# Patient Record
Sex: MALE | Race: WHITE | Employment: OTHER | URBAN - METROPOLITAN AREA
[De-identification: names, ages, dates, MRNs, and addresses within clinical notes are randomized per-mention and may not be internally consistent; named-entity substitution may affect disease eponyms.]

---

## 2017-01-16 ENCOUNTER — OFFICE VISIT (OUTPATIENT)
Dept: FAMILY MEDICINE CLINIC | Age: 72
End: 2017-01-16

## 2017-01-16 VITALS
HEART RATE: 81 BPM | HEIGHT: 69 IN | SYSTOLIC BLOOD PRESSURE: 147 MMHG | OXYGEN SATURATION: 97 % | TEMPERATURE: 97 F | RESPIRATION RATE: 18 BRPM | DIASTOLIC BLOOD PRESSURE: 71 MMHG | BODY MASS INDEX: 30.17 KG/M2 | WEIGHT: 203.7 LBS

## 2017-01-16 DIAGNOSIS — I10 ESSENTIAL HYPERTENSION: ICD-10-CM

## 2017-01-16 DIAGNOSIS — N18.9 ANEMIA IN CHRONIC RENAL DISEASE: ICD-10-CM

## 2017-01-16 DIAGNOSIS — E78.5 HYPERLIPIDEMIA, UNSPECIFIED HYPERLIPIDEMIA TYPE: ICD-10-CM

## 2017-01-16 DIAGNOSIS — D63.1 ANEMIA IN CHRONIC RENAL DISEASE: ICD-10-CM

## 2017-01-16 DIAGNOSIS — Z12.5 PROSTATE CANCER SCREENING: ICD-10-CM

## 2017-01-16 DIAGNOSIS — N18.30 CHRONIC KIDNEY DISEASE, STAGE III (MODERATE) (HCC): ICD-10-CM

## 2017-01-16 DIAGNOSIS — E78.00 PURE HYPERCHOLESTEROLEMIA: ICD-10-CM

## 2017-01-16 DIAGNOSIS — Z71.89 ADVANCED DIRECTIVES, COUNSELING/DISCUSSION: ICD-10-CM

## 2017-01-16 DIAGNOSIS — Z13.39 SCREENING FOR ALCOHOLISM: ICD-10-CM

## 2017-01-16 DIAGNOSIS — Z00.00 PHYSICAL EXAM: ICD-10-CM

## 2017-01-16 DIAGNOSIS — E79.0 HYPERURICEMIA: ICD-10-CM

## 2017-01-16 DIAGNOSIS — E55.9 VITAMIN D DEFICIENCY: ICD-10-CM

## 2017-01-16 DIAGNOSIS — R80.9 PROTEINURIA: ICD-10-CM

## 2017-01-16 DIAGNOSIS — Z00.00 MEDICARE ANNUAL WELLNESS VISIT, SUBSEQUENT: Primary | ICD-10-CM

## 2017-01-16 NOTE — MR AVS SNAPSHOT
Visit Information Date & Time Provider Department Dept. Phone Encounter #  
 1/16/2017 11:00 AM Piter Ellis MD [facility] [phone] [id number] Follow-up Instructions Return in about 6 months (around 7/16/2017) for Recheck. Upcoming Health Maintenance Date Due DTaP/Tdap/Td series (1 - Tdap) 8/15/2007 MEDICARE YEARLY EXAM 9/9/2015 GLAUCOMA SCREENING Q2Y 12/29/2018 COLONOSCOPY 6/16/2024 Allergies as of 1/16/2017  Review Complete On: 1/16/2017 By: Piter Ellis MD  
 No Known Allergies Current Immunizations  Reviewed on 1/13/2017 Name Date Influenza High Dose Vaccine PF 9/21/2016 11:35 AM, 10/21/2015, 10/29/2014 Influenza Vaccine Split 9/26/2012, 11/16/2010 Pneumococcal Conjugate (PCV-13) 10/21/2015 Pneumococcal Polysaccharide (PPSV-23) 9/7/2012, 8/14/2007 TD Vaccine 8/14/2007 Zoster 9/15/2006 Not reviewed this visit You Were Diagnosed With   
  
 Codes Comments Elevated BP    -  Primary ICD-10-CM: I10 
ICD-9-CM: 401.9 Medicare annual wellness visit, subsequent     ICD-10-CM: Z00.00 ICD-9-CM: V70.0 Physical exam     ICD-10-CM: Z00.00 ICD-9-CM: V70.9 Hyperlipidemia, unspecified hyperlipidemia type     ICD-10-CM: E78.5 ICD-9-CM: 272.4 Anemia in chronic renal disease     ICD-10-CM: N18.9, D63.1 ICD-9-CM: 285.21, 585.9 Vitamin D deficiency     ICD-10-CM: E55.9 ICD-9-CM: 268.9 Chronic kidney disease, stage III (moderate)     ICD-10-CM: N18.3 ICD-9-CM: 500. 3 Proteinuria     ICD-10-CM: R80.9 ICD-9-CM: 791.0 Hyperuricemia     ICD-10-CM: E79.0 ICD-9-CM: 790.6 Pure hypercholesterolemia     ICD-10-CM: E78.00 ICD-9-CM: 272.0 Essential hypertension     ICD-10-CM: I10 
ICD-9-CM: 401.9 Normal body mass index (BMI)     ICD-10-CM: Z78.9 ICD-9-CM: V49.89 Routine general medical examination at a health care facility     ICD-10-CM: Z00.00 ICD-9-CM: V70.0 Screening for alcoholism     ICD-10-CM: Z13.89 ICD-9-CM: V79.1 Advanced directives, counseling/discussion     ICD-10-CM: Z71.89 ICD-9-CM: V65.49 Vitals BP Pulse Temp Resp Height(growth percentile) Weight(growth percentile) 147/71 (BP 1 Location: Right arm, BP Patient Position: Sitting) 81 97 °F (36.1 °C) 18 5' 9.29\" (1.76 m) 203 lb 11.2 oz (92.4 kg) SpO2 BMI Smoking Status 97% 29.83 kg/m2 Never Smoker BMI and BSA Data Body Mass Index Body Surface Area  
 29.83 kg/m 2 2.13 m 2 Preferred Pharmacy Pharmacy Name Phone [facility] [address], [address] [place] [address]. [phone] Your Updated Medication List  
  
   
This list is accurate as of: 1/16/17 11:33 AM.  Always use your most recent med list.  
  
  
  
  
 allopurinol 100 mg tablet Commonly known as:  Kristin Huh Take 100 mg by mouth nightly. amLODIPine-benazepril 5-10 mg per capsule Commonly known as:  Dior Deep Take 1 Cap by mouth daily. aspirin delayed-release 81 mg tablet Take 81 mg by mouth daily. CLARITIN 10 mg tablet Generic drug:  loratadine Take 10 mg by mouth daily. folic acid 109 mcg tablet Take 400 mcg by mouth daily. KRILL OIL PO Take 1 Tab by mouth two (2) times a day. OCUVITE tablet Generic drug:  vitamin a-vitamin c-vit e-min Take 1 Tab by mouth daily. PROCRIT INJECTION  
by Injection route. simvastatin 10 mg tablet Commonly known as:  ZOCOR  
TAKE 1 TABLET NIGHTLY  
  
 vitamin E 400 unit capsule Commonly known as:  [address] Take 400 Units by mouth two (2) times a day. Follow-up Instructions Return in about 6 months (around 7/16/2017) for Recheck. Introducing \[facility] [place] [facility]\"" & HEALTH SERVICES! Dear Pj Hancock: Thank you for requesting a [employer] account. Our records indicate that you already have an active [employer] account.   You can access your account anytime at [email or web address] Did you know that you can access your hospital and ER discharge instructions at any time in [employer]? You can also review all of your test results from your hospital stay or ER visit. Additional Information If you have questions, please visit the Frequently Asked Questions section of the [employer] website at [email or web address] Remember, [employer] is NOT to be used for urgent needs. For medical emergencies, dial 911. Now available from your iPhone and Android! Please provide this summary of care documentation to your next provider. Your primary care clinician is listed as Augustin Gross Dr. If you have any questions after today's visit, please call [phone].

## 2017-01-16 NOTE — PROGRESS NOTES
HISTORY OF PRESENT ILLNESS  Katy Leroy is a 80 y.o. male. HPI   Here for [facility]. Eye doctor past yr. Dentist 2 x annually. Gum doctor q 4 mos. Colonoscopy '14.  10 yr repeat. Heme q 2 weeks. Neph 2 x annually. Hosp Sept '16 for anemia. Got transfused. No other signif hx since last [facility] 11/2015. Discussed and pt desires LUCRETIA, PSA. Patient Active Problem List   Diagnosis Code    Pure hypercholesterolemia E78.00    Essential hypertension I10    Hyperuricemia E79.0    Chronic kidney disease, stage III (moderate) N18.3    Proteinuria R80.9    Vitamin D deficiency E55.9    Anemia in chronic renal disease N18.9, D63.1    Elevated random blood glucose level R73.09    Anemia D64.9    Hyperlipidemia E78.5     Current Outpatient Prescriptions   Medication Sig Dispense Refill    EPOETIN KARIME (PROCRIT INJECTION) by Injection route.  simvastatin (ZOCOR) 10 mg tablet TAKE 1 TABLET NIGHTLY 90 Tab 0    KRILL OIL PO Take 1 Tab by mouth two (2) times a day.  amLODIPine-benazepril (LOTREL) 5-10 mg per capsule Take 1 Cap by mouth daily.  allopurinol (ZYLOPRIM) 100 mg tablet Take 100 mg by mouth nightly.  vitamin a-vitamin c-vit e-min (OCUVITE) tablet Take 1 Tab by mouth daily.  aspirin delayed-release 81 mg tablet Take 81 mg by mouth daily.  loratadine (CLARITIN) 10 mg tablet Take 10 mg by mouth daily.  folic acid 090 mcg tablet Take 400 mcg by mouth daily.  vitamin E (AQUA GEMS) 400 unit capsule Take 400 Units by mouth two (2) times a day. No Known Allergies  Past Medical History   Diagnosis Date    Advance directive discussed with patient     Allergic rhinitis     Anemia NEC       federico aldrich 10/30/09- 5/2014-6/25/15  12/7/16 note from [facility]    Cancer [facility])      skin    Chronic kidney disease      dr Siobhan Garrison stage 3  11/7/16     Contact dermatitis and other eczema, due to unspecified cause      rosacea, and [place]    Gingivitis  History of chicken pox     Hypercholesterolemia     Hypertension     Renal cyst      woody  5/4/16    Screening for glaucoma 12/29/2016     nury alexandrea note    Trauma       5 fx ribs and splenic injury 6/10     Past Surgical History   Procedure Laterality Date    Ct heart w/o cont with calcium  12/08     score of 46    Egd  11/18/09     dr Ghada Hdz grade 1 esophagitis    Endoscopy, colon, diagnostic  6/16/2014 6/16/14 with dr. Heredia/11/09 dr Ghada Hdz diverticula and 10 year repeat     Family History   Problem Relation Age of Onset    Cancer Mother      lung smoker    Hypertension Mother     Cancer Father     Heart Disease Father     Diabetes Maternal Grandmother      Social History   Substance Use Topics    Smoking status: Never Smoker    Smokeless tobacco: Never Used    Alcohol use No      Lab Results  Component Value Date/Time   WBC 4.4 09/07/2016 04:30 AM   HGB 8.4 09/07/2016 01:42 PM   HCT 24.8 09/07/2016 01:42 PM   PLATELET 116 99/14/1289 04:30 AM   MCV 92.1 09/07/2016 04:30 AM       Lab Results  Component Value Date/Time   Hemoglobin A1c 5.6 09/09/2013 08:55 AM   Hemoglobin A1c 5.7 05/07/2012 09:48 AM   Glucose 111 09/07/2016 04:30 AM   LDL, calculated 46 11/12/2015 08:29 AM   Creatinine 1.93 09/07/2016 04:30 AM      Lab Results  Component Value Date/Time   Cholesterol, total 117 11/12/2015 08:29 AM   HDL Cholesterol 25 11/12/2015 08:29 AM   LDL, calculated 46 11/12/2015 08:29 AM   Triglyceride 228 11/12/2015 08:29 AM   CHOL/HDL Ratio 3.9 04/15/2010 09:00 AM       Lab Results  Component Value Date/Time   ALT 22 09/06/2016 04:55 AM   AST 17 09/06/2016 04:55 AM   Alk.  phosphatase 63 09/06/2016 04:55 AM   Bilirubin, direct 0.17 02/11/2011 09:49 AM   Bilirubin, total 0.4 09/06/2016 04:55 AM       Lab Results  Component Value Date/Time   GFR est AA 42 09/07/2016 04:30 AM   GFR est non-AA 35 09/07/2016 04:30 AM   Creatinine 1.93 09/07/2016 04:30 AM   BUN 43 09/07/2016 04:30 AM   Sodium 142 09/07/2016 04:30 AM   Potassium 4.1 09/07/2016 04:30 AM   Chloride 113 09/07/2016 04:30 AM   CO2 17 09/07/2016 04:30 AM      Lab Results  Component Value Date/Time   TSH 2.050 11/12/2015 08:29 AM      Lab Results   Component Value Date/Time    Glucose 111 09/07/2016 04:30 AM       Lab Results   Component Value Date/Time    Prostate Specific Ag 0.7 11/12/2015 08:29 AM    Prostate Specific Ag 0.8 09/08/2014 09:45 AM    Prostate Specific Ag 0.9 09/09/2013 08:55 AM    Prostate Specific Ag 0.7 10/07/2009 08:23 AM     Not fasting  Review of Systems   Constitutional: Negative. HENT: Negative. Eyes: Negative. Respiratory: Negative. Cardiovascular: Negative. Gastrointestinal: Negative. Genitourinary: Negative. Musculoskeletal: Negative. Skin: Negative. Neurological: Negative. Endo/Heme/Allergies: Positive for environmental allergies. Bruises/bleeds easily. Psychiatric/Behavioral: Negative. Physical Exam   Vitals:    01/16/17 1059   BP: 147/71   Pulse: 81   Resp: 18   Temp: 97 °F (36.1 °C)   SpO2: 97%   Weight: 203 lb 11.2 oz (92.4 kg)   Height: 5' 9.29\" (1.76 m)       General  alert, cooperative, no distress, appears stated age   Head  Normocephalic, without obvious abnormality, atraumatic   Eyes  conjunctivae/corneas clear. PERRL. Fundi benign   Ears  Canals and TMs clear   Nose Passages patent. Mucosa normal. No drainage or sinus tenderness. Throat Lips, mucosa, and tongue normal. Teeth and gums normal.  Post pharynx neg. Neck supple, nontender, no adenopathy, thyroid: not enlarged, no masses/nodules, no carotid bruits   Back   symmetric, no curvature. FROM. No CVA tenderness   Lungs   clear to auscultation bilaterally   Chest wall  no tenderness   Heart  regular rate and rhythm, no murmur, click, rub or gallop   Abdomen   soft, non-tender. Bowel sounds normal. No masses,  No organomegaly   Genitalia  Testes descended bilat w/o masses.   No hernias   Rectal  Normal tone, normal prostate, no masses or tenderness   Extremities extremities normal, atraumatic, no cyanosis or edema   Pulses 2+ and symmetric   Skin No rashes or lesions       Neurologic Romberg neg, nml heel, toe and Tandem gait. DTRs 2+ symmetric         ASSESSMENT and PLAN    ICD-10-CM ICD-9-CM    1. Medicare annual wellness visit, subsequent Z00.00 V70.0    2. Physical exam Z00.00 V70.9 LIPID PANEL      PSA SCREENING (SCREENING)      URINALYSIS W/ RFLX MICROSCOPIC      TSH 3RD GENERATION   3. Elevated BP I10 401.9    4. Hyperlipidemia, unspecified hyperlipidemia type E78.5 272.4 LIPID PANEL   5. Anemia in chronic renal disease N18.9 285.21 URINALYSIS W/ RFLX MICROSCOPIC    D63.1 585.9    6. Vitamin D deficiency E55.9 268.9 URINALYSIS W/ RFLX MICROSCOPIC   7. Chronic kidney disease, stage III (moderate) N18.3 585. 3 URINALYSIS W/ RFLX MICROSCOPIC   8. Proteinuria R80.9 791.0 URINALYSIS W/ RFLX MICROSCOPIC   9. Hyperuricemia E79.0 790.6 URIC ACID   10. Pure hypercholesterolemia E78.00 272.0 LIPID PANEL   11. Essential hypertension I10 401.9 LIPID PANEL      URINALYSIS W/ RFLX MICROSCOPIC      TSH 3RD GENERATION   12. Normal body mass index (BMI) Z78.9 V49.89    13. Screening for alcoholism Z13.89 V79.1    14. Advanced directives, counseling/discussion Z71.89 V65.49    15. Prostate cancer screening Z12.5 V76.44 PSA SCREENING (SCREENING)     Follow-up Disposition:  Return in about 6 months (around 7/16/2017) for Recheck. This is a Subsequent Medicare Annual Wellness Visit providing Personalized Prevention Plan Services (PPPS) (Performed 12 months after initial AWV and PPPS )    I have reviewed the patient's medical history in detail and updated the computerized patient record. History     Past Medical History   Diagnosis Date    Advance directive discussed with patient     Allergic rhinitis     Anemia NEC       federico aldrich 10/30/09- 5/2014-6/25/15  12/7/16 note from [facility]    Cancer [facility])      skin    Chronic kidney disease      dr [facility] stage 3  11/7/16     Contact dermatitis and other eczema, due to unspecified cause      rosacea, and [place]    Gingivitis     History of chicken pox     Hypercholesterolemia     Hypertension     Renal cyst      woody  5/4/16    Screening for glaucoma 12/29/2016     nury lechuga note    Trauma       5 fx ribs and splenic injury 6/10      Past Surgical History   Procedure Laterality Date    Ct heart w/o cont with calcium  12/08     score of 46    Egd  11/18/09     dr Lavonne Negrete grade 1 esophagitis    Endoscopy, colon, diagnostic  6/16/2014 6/16/14 with dr. Heredia/11/09 dr Lavonne Negerte diverticula and 10 year repeat     Current Outpatient Prescriptions   Medication Sig Dispense Refill    EPOETIN KARIME (PROCRIT INJECTION) by Injection route.  simvastatin (ZOCOR) 10 mg tablet TAKE 1 TABLET NIGHTLY 90 Tab 0    KRILL OIL PO Take 1 Tab by mouth two (2) times a day.  amLODIPine-benazepril (LOTREL) 5-10 mg per capsule Take 1 Cap by mouth daily.  allopurinol (ZYLOPRIM) 100 mg tablet Take 100 mg by mouth nightly.  vitamin a-vitamin c-vit e-min (OCUVITE) tablet Take 1 Tab by mouth daily.  aspirin delayed-release 81 mg tablet Take 81 mg by mouth daily.  loratadine (CLARITIN) 10 mg tablet Take 10 mg by mouth daily.  folic acid 145 mcg tablet Take 400 mcg by mouth daily.  vitamin E (AQUA GEMS) 400 unit capsule Take 400 Units by mouth two (2) times a day.        No Known Allergies  Family History   Problem Relation Age of Onset    Cancer Mother      lung smoker    Hypertension Mother     Cancer Father     Heart Disease Father     Diabetes Maternal Grandmother      Social History   Substance Use Topics    Smoking status: Never Smoker    Smokeless tobacco: Never Used    Alcohol use No     Patient Active Problem List   Diagnosis Code    Pure hypercholesterolemia E78.00    Essential hypertension I10    Hyperuricemia E79.0    Chronic kidney disease, stage III (moderate) N18.3    Proteinuria R80.9    Vitamin D deficiency E55.9    Anemia in chronic renal disease N18.9, D63.1    Hyperlipidemia E78.5       Depression Risk Factor Screening:     PHQ 2 / 9, over the last two weeks 1/16/2017   Little interest or pleasure in doing things Not at all   Feeling down, depressed or hopeless Not at all   Total Score PHQ 2 0     Alcohol Risk Factor Screening: On any occasion during the past 3 months, have you had more than 4 drinks containing alcohol? Not applicable    Do you average more than 14 drinks per week? Not applicable    Nondrinker  Functional Ability and Level of Safety:     Hearing Loss   borderline normal    Activities of Daily Living   Self-care. Requires assistance with: no ADLs    Fall Risk     Fall Risk Assessment, last 12 mths 1/16/2017   Able to walk? Yes   Fall in past 12 months? No     Abuse Screen   Patient is not abused    Review of Systems   See elsewhere    Physical Examination     Evaluation of Cognitive Function:  Mood/affect:  happy  Appearance: age appropriate, casually dressed and within normal Limits  Family member/caregiver input: NA    No exam performed today, NA. Patient Care Team:  Maribel Pugh MD as PCP - Hermilo Soler MD as Consulting Provider (Nephrology)  Estefanía Quarlse MD (Hematology and Oncology)  Brooklynn Edwards RN as Ambulatory Care Navigator [facility])  Alfredo Cardona MD (Optometry)    Advice/Referrals/Counseling   Education and counseling provided:  Are appropriate based on today's review and evaluation  End-of-Life planning (with patient's consent)  Pneumococcal Vaccine  Influenza Vaccine  Prostate cancer screening tests (PSA, covered annually)  Colorectal cancer screening tests  Cardiovascular screening blood test  Screening for glaucoma  Diabetes screening test    Assessment/Plan       ICD-10-CM ICD-9-CM    1. Elevated BP I10 401.9    2.  Medicare annual wellness visit, subsequent Z00.00 V70.0    3. Physical exam Z00.00 V70.9    4. Hyperlipidemia, unspecified hyperlipidemia type E78.5 272.4    5. Anemia in chronic renal disease N18.9 285.21     D63.1 585.9    6. Vitamin D deficiency E55.9 268.9    7. Chronic kidney disease, stage III (moderate) N18.3 585.3    8. Proteinuria R80.9 791.0    9. Hyperuricemia E79.0 790.6    10. Pure hypercholesterolemia E78.00 272.0    11. Essential hypertension I10 401.9    12. Normal body mass index (BMI) Z78.9 V49.89      Follow-up Disposition:  Return in about 6 months (around 7/16/2017) for Recheck. Benoit Souza

## 2017-01-16 NOTE — LETTER
1/17/2017 3:14 PM 
 
Mr. Minerva Tyson. 
[address] [place] [id number] Dear Minerva Tyson.: 
 
Please find your most recent results below. Resulted Orders LIPID PANEL Result Value Ref Range Cholesterol, total 140 100 - 199 mg/dL Triglyceride 352 (H) 0 - 149 mg/dL HDL Cholesterol 26 (L) >39 mg/dL VLDL, calculated 70 (H) 5 - 40 mg/dL LDL, calculated 44 0 - 99 mg/dL Narrative Performed at:  [address]  [id number] : Casey Gabriel MD, Phone:  [phone] PSA SCREENING (SCREENING) Result Value Ref Range Prostate Specific Ag 0.8 0.0 - 4.0 ng/mL Comment:  
   Roche ECLIA methodology. According to the American Urological Association, Serum PSA should 
decrease and remain at undetectable levels after radical 
prostatectomy. The AUA defines biochemical recurrence as an initial 
PSA value 0.2 ng/mL or greater followed by a subsequent confirmatory PSA value 0.2 ng/mL or greater. Values obtained with different assay methods or kits cannot be used 
interchangeably. Results cannot be interpreted as absolute evidence 
of the presence or absence of malignant disease. Narrative Performed at:  [address]  [id number] : Casey Gabriel MD, Phone:  [phone] URINALYSIS W/ RFLX MICROSCOPIC Result Value Ref Range Specific Gravity 1.016 1.005 - 1.030  
 pH (UA) 6.0 5.0 - 7.5 Color Yellow Yellow Appearance Clear Clear Leukocyte Esterase Negative Negative Protein 2+ (A) Negative/Trace Glucose Negative Negative Ketone Negative Negative Blood Negative Negative Bilirubin Negative Negative Urobilinogen 0.2 0.2 - 1.0 mg/dL Nitrites Negative Negative Microscopic Examination See additional order Comment:  
   Microscopic was indicated and was performed. Narrative Performed at:  [address]  [id number] : Gilberto Quezada MD, Phone:  [phone] TSH 3RD GENERATION Result Value Ref Range TSH 1.580 0.450 - 4.500 uIU/mL Narrative Performed at:  [address]  [id number] : Gilberto Quezada MD, Phone:  [phone] URIC ACID Result Value Ref Range Uric acid 6.8 3.7 - 8.6 mg/dL Comment:  
              Therapeutic target for gout patients: <6.0 Narrative Performed at:  [address]  [id number] : Gilberto Quezada MD, Phone:  [phone] MICROSCOPIC EXAMINATION Result Value Ref Range WBC 0-5 0 - 5 /hpf  
 RBC 0-2 0 - 2 /hpf Epithelial cells None seen 0 - 10 /hpf Casts None seen None seen /lpf Mucus Present Not Estab. Bacteria Few None seen/Few Narrative Performed at:  [address]  [id number] : Gilberto Quezada MD, Phone:  [phone] CKD REPORT Result Value Ref Range Interpretation Note Comment:  
   Supplement report is available. Narrative Performed at:  [address] [place] [address]  [id number] : Dinora Islas PhD, Phone:  [phone] CVD REPORT Result Value Ref Range INTERPRETATION NTAP Narrative Performed at:  [address] [place] [address]  [id number] : Dinora Islas PhD, Phone:  [phone] Sincerely, Liliana Perez MD

## 2017-01-16 NOTE — PROGRESS NOTES
Here for his annual visit -he has had lots of lab thru other providers and the results are in the system. He is due for some fasting lab for this office. He would like a CHP and will sign a waiver  We have notes from [facility]rologist and [facility]. Has appt with Dr Abhi Marquez at [facility] this week  Has his ADC at home and he will bring it in for his records  Non smoker with no plans to start  Complete Physical Exam Male  Pre-Visit Questions:    1. Do you follow a low fat or low salt diet ? yes  2. Do you follow an exercise program? yes  3. Have you had your tetanus booster in the last 10 years? 2007 td  4. Have you ever had a Pneumonia vaccine? yes  5. Do you smoke? no  6. Do you consider yourself overweight? no  7. Do you perform testicle self exam?yes  8. Is there a family history of CAD< age 48? no  5. Is there a family history of Cancer? yes  10. Do you have any Cancer risks? no  11. Have you had a colonoscopy? yes  12. Have you been to your eye doctor past year?   yes  13. Have you been to your dentist in the last 6 months?  yes  14. Have you had your flu shot for this season? Will take today    17. Have you had a bone density scan(DEXA)?  no

## 2017-01-16 NOTE — ACP (ADVANCE CARE PLANNING)
Advance Care Planning    Advance Care Planning (ACP) Provider Conversation Snapshot    Date of ACP Conversation: 01/16/17  Persons included in Conversation:  patient  Length of ACP Conversation in minutes:  NA    Authorized Decision Maker (if patient is incapable of making informed decisions): This person is:    Other Legally Authorized Decision Maker (e.g. Next of Kin)          For Patients with Decision Making Capacity:   Values/Goals: Exploration of values, goals, and preferences if recovery is not expected, even with continued medical treatment in the event of:  Imminent death  Severe, permanent brain injury    Conversation Outcomes / Follow-Up Plan:   Recommended completion of Advance Directive form after review of ACP materials and conversation with prospective healthcare agent

## 2017-01-17 LAB
APPEARANCE UR: CLEAR
BACTERIA #/AREA URNS HPF: NORMAL /[HPF]
BILIRUB UR QL STRIP: NEGATIVE
CASTS URNS QL MICRO: NORMAL /LPF
CHOLEST SERPL-MCNC: 140 MG/DL (ref 100–199)
COLOR UR: YELLOW
EPI CELLS #/AREA URNS HPF: NORMAL /HPF
GLUCOSE UR QL: NEGATIVE
HDLC SERPL-MCNC: 26 MG/DL
HGB UR QL STRIP: NEGATIVE
INTERPRETATION, 910389: NORMAL
INTERPRETATION: NORMAL
KETONES UR QL STRIP: NEGATIVE
LDLC SERPL CALC-MCNC: 44 MG/DL (ref 0–99)
LEUKOCYTE ESTERASE UR QL STRIP: NEGATIVE
MICRO URNS: ABNORMAL
MUCOUS THREADS URNS QL MICRO: PRESENT
NITRITE UR QL STRIP: NEGATIVE
PH UR STRIP: 6 [PH] (ref 5–7.5)
PROT UR QL STRIP: ABNORMAL
PSA SERPL-MCNC: 0.8 NG/ML (ref 0–4)
RBC #/AREA URNS HPF: NORMAL /HPF
SP GR UR: 1.02 (ref 1–1.03)
TRIGL SERPL-MCNC: 352 MG/DL (ref 0–149)
TSH SERPL DL<=0.005 MIU/L-ACNC: 1.58 UIU/ML (ref 0.45–4.5)
URATE SERPL-MCNC: 6.8 MG/DL (ref 3.7–8.6)
UROBILINOGEN UR STRIP-MCNC: 0.2 MG/DL (ref 0.2–1)
VLDLC SERPL CALC-MCNC: 70 MG/DL (ref 5–40)
WBC #/AREA URNS HPF: NORMAL /HPF

## 2017-03-14 RX ORDER — SIMVASTATIN 10 MG/1
TABLET, FILM COATED ORAL
Qty: 90 TAB | Refills: 2 | Status: SHIPPED | OUTPATIENT
Start: 2017-03-14 | End: 2017-11-07 | Stop reason: SDUPTHER

## 2017-07-17 ENCOUNTER — OFFICE VISIT (OUTPATIENT)
Dept: FAMILY MEDICINE CLINIC | Age: 72
End: 2017-07-17

## 2017-07-17 VITALS
BODY MASS INDEX: 30.59 KG/M2 | TEMPERATURE: 97.2 F | HEART RATE: 69 BPM | OXYGEN SATURATION: 97 % | SYSTOLIC BLOOD PRESSURE: 139 MMHG | HEIGHT: 69 IN | WEIGHT: 206.5 LBS | DIASTOLIC BLOOD PRESSURE: 77 MMHG | RESPIRATION RATE: 18 BRPM

## 2017-07-17 DIAGNOSIS — N18.30 CHRONIC KIDNEY DISEASE, STAGE III (MODERATE) (HCC): ICD-10-CM

## 2017-07-17 DIAGNOSIS — D63.1 ANEMIA IN CHRONIC RENAL DISEASE: ICD-10-CM

## 2017-07-17 DIAGNOSIS — I10 ESSENTIAL HYPERTENSION: Primary | ICD-10-CM

## 2017-07-17 DIAGNOSIS — R80.9 PROTEINURIA, UNSPECIFIED TYPE: ICD-10-CM

## 2017-07-17 DIAGNOSIS — N18.9 ANEMIA IN CHRONIC RENAL DISEASE: ICD-10-CM

## 2017-07-17 RX ORDER — AMLODIPINE AND BENAZEPRIL HYDROCHLORIDE 5; 20 MG/1; MG/1
1 CAPSULE ORAL DAILY
COMMUNITY
Start: 2017-05-24 | End: 2019-05-22 | Stop reason: ALTCHOICE

## 2017-07-17 NOTE — PROGRESS NOTES
Pre call completed with patient and wife regarding upcoming procedure. Patient states his last dose of Aspirin 81 mg was 7/9/2017.

## 2017-07-17 NOTE — PROGRESS NOTES
Chief Complaint   Patient presents with    Labs     Fasting. BS at home  on 7/4/17.  Blood Pressure Check     BP med changed 6 months ago. Will have a needle biopsy of kidney on 7/18/17 at [facility] for Dr. Kole Macias. 1. Have you been to the ER, urgent care clinic since your last visit? Hospitalized since your last visit? No    2. Have you seen or consulted any other health care providers outside of the [address] [place] [address] since your last visit? Include any pap smears or colon screening. Yes When: 07/13/17 [facility] Reason for visit: Cm Jim I have reviewed Health Maintenance with the patient and updated. Patient has a Living will and wife is aware of his wishes. The patient was counseled on the dangers of tobacco use, and Patient is a non smoker. Reviewed strategies to maximize success, including Continue not to smoke.

## 2017-07-17 NOTE — MR AVS SNAPSHOT
Visit Information Date & Time Provider Department Dept. Phone Encounter #  
 7/17/2017 10:00 AM Robin Granados MD [facility] [phone] [id number] Follow-up Instructions Return in about 6 months (around 1/17/2018) for Ashu Sosa Upcoming Health Maintenance Date Due DTaP/Tdap/Td series (1 - Tdap) 10/15/2017* INFLUENZA AGE 9 TO ADULT 8/1/2017 MEDICARE YEARLY EXAM 1/17/2018 GLAUCOMA SCREENING Q2Y 12/29/2018 COLONOSCOPY 6/16/2024 *Topic was postponed. The date shown is not the original due date. Allergies as of 7/17/2017  Review Complete On: 7/17/2017 By: Ami Hatch RN No Known Allergies Current Immunizations  Reviewed on 1/13/2017 Name Date Influenza High Dose Vaccine PF 9/21/2016 11:35 AM, 10/21/2015, 10/29/2014 Influenza Vaccine Split 9/26/2012, 11/16/2010 Pneumococcal Conjugate (PCV-13) 10/21/2015 Pneumococcal Polysaccharide (PPSV-23) 9/7/2012, 8/14/2007 TD Vaccine 8/14/2007 Zoster 9/15/2006 Not reviewed this visit You Were Diagnosed With   
  
 Codes Comments Essential hypertension    -  Primary ICD-10-CM: I10 
ICD-9-CM: 401.9 Anemia in chronic renal disease     ICD-10-CM: N18.9, D63.1 ICD-9-CM: 285.21 Chronic kidney disease, stage III (moderate)     ICD-10-CM: N18.3 ICD-9-CM: 371. 3 Proteinuria, unspecified type     ICD-10-CM: R80.9 ICD-9-CM: 791.0 Vitals BP Pulse Temp Resp Height(growth percentile) Weight(growth percentile) 139/77 (BP 1 Location: Left arm, BP Patient Position: Sitting) 69 97.2 °F (36.2 °C) (Oral) 18 5' 9.29\" (1.76 m) 206 lb 8 oz (93.7 kg) SpO2 BMI Smoking Status 97% 30.24 kg/m2 Never Smoker Vitals History BMI and BSA Data Body Mass Index Body Surface Area  
 30.24 kg/m 2 2.14 m 2 Preferred Pharmacy Pharmacy Name Phone [address] [place], [address] [place] [address]. [phone] Your Updated Medication List  
  
   
This list is accurate as of: 7/17/17 10:23 AM.  Always use your most recent med list.  
  
  
  
  
 allopurinol 100 mg tablet Commonly known as:  Gretchendeborah Arroyo Take 100 mg by mouth nightly. amLODIPine-benazepril 5-20 mg per capsule Commonly known as:  Kaden Ramirez Take 1 Cap by mouth daily. aspirin delayed-release 81 mg tablet Take 81 mg by mouth daily. CLARITIN 10 mg tablet Generic drug:  loratadine Take 10 mg by mouth daily. folic acid 203 mcg tablet Take 400 mcg by mouth daily. KRILL OIL PO Take 1 Tab by mouth daily. OCUVITE tablet Generic drug:  vitamin a-vitamin c-vit e-min Take 1 Tab by mouth daily. PROCRIT INJECTION  
by Injection route. simvastatin 10 mg tablet Commonly known as:  ZOCOR  
TAKE 1 TABLET NIGHTLY  
  
 vitamin E 400 unit capsule Commonly known as:  [address] Take 400 Units by mouth two (2) times a day. Follow-up Instructions Return in about 6 months (around 1/17/2018) for [address] To-Do List   
 07/18/2017 10:30 AM  
  Appointment with [address] at [facility] ([phone]) DIET RESTRICTIONS 1. For invasive and sedation procedures, patient should be NPO 8 hours prior to exam, unless instructed otherwise. 2. Take all medications not requiring food with sip of water, excluding blood thinners and diabetic medications. GENERAL INSTRUCTIONS 1. Bring a list of all medications you are currently taking, including over the counter medications. 2. Blood thinners and platelet inhibitors must be stopped 3-5 days prior to procedure. Consult your ordering physician prior to stopping them. 3. Check in at registration 1 hour before your appointment time unless you were instructed to do otherwise. 4. If sedation is required, you must have a  present before procedure is started. 5. The procedure may last 1-1 ½ hours.   You may be required to stay 4-6 hours after the procedure for observation. 6. Bring any non [facility] films/images pertaining to the area of interest with you on the day of appointment. Introducing [facility] & HEALTH SERVICES! Dear Brodie Cleaning: Thank you for requesting a Nanoleaf account. Our records indicate that you already have an active Nanoleaf account. You can access your account anytime at [email or web address] Did you know that you can access your hospital and ER discharge instructions at any time in Nanoleaf? You can also review all of your test results from your hospital stay or ER visit. Additional Information If you have questions, please visit the Frequently Asked Questions section of the Nanoleaf website at [email or web address] Remember, Nanoleaf is NOT to be used for urgent needs. For medical emergencies, dial 911. Now available from your iPhone and Android! Please provide this summary of care documentation to your next provider. Your primary care clinician is listed as [address] If you have any questions after today's visit, please call [phone].

## 2017-07-17 NOTE — PROGRESS NOTES
Walks dog averages 3-4 miles daily. Watches fat, sugar, salt. Sees onc q 3 mos, neph q 3 mos. Labs 2 x monthly. Onc manages anemia. Plan for needle bx of kidney tomorrow. Visit Vitals    /77 (BP 1 Location: Left arm, BP Patient Position: Sitting)    Pulse 69    Temp 97.2 °F (36.2 °C) (Oral)    Resp 18    Ht 5' 9.29\" (1.76 m)    Wt 206 lb 8 oz (93.7 kg)    SpO2 97%    BMI 30.24 kg/m2       Patient alert and cooperative. Reviewed above. Assessment:  1. Chronic kidney disease with anemia, requiring Procrit infusions, followed by oncologist and nephrologist.  2. HTN with good readings. Plan:  1. Return six months for wellness, annual visit, labs. 2. Wife is watching his blood sugar, which was high at one point. 3. Follow otherwise here prn.

## 2017-07-18 ENCOUNTER — HOSPITAL ENCOUNTER (OUTPATIENT)
Dept: CT IMAGING | Age: 72
Setting detail: OBSERVATION
Discharge: HOME | End: 2017-07-19
Attending: INTERNAL MEDICINE | Admitting: INTERNAL MEDICINE
Payer: MEDICARE

## 2017-07-18 DIAGNOSIS — R80.9 PROTEINURIA: ICD-10-CM

## 2017-07-18 DIAGNOSIS — N18.30 CHRONIC KIDNEY DISEASE, STAGE III (MODERATE) (HCC): ICD-10-CM

## 2017-07-18 PROBLEM — Z98.890 S/P BIOPSY: Status: ACTIVE | Noted: 2017-07-18

## 2017-07-18 LAB
ABO + RH BLD: NORMAL
BASOPHILS # BLD AUTO: 0 K/UL (ref 0–0.1)
BASOPHILS # BLD: 1 % (ref 0–1)
BLOOD GROUP ANTIBODIES SERPL: NORMAL
EOSINOPHIL # BLD: 0.2 K/UL (ref 0–0.4)
EOSINOPHIL NFR BLD: 4 % (ref 0–7)
ERYTHROCYTE [DISTWIDTH] IN BLOOD BY AUTOMATED COUNT: 14.7 % (ref 11.5–14.5)
ERYTHROCYTE [DISTWIDTH] IN BLOOD BY AUTOMATED COUNT: 14.9 % (ref 11.5–14.5)
HCT VFR BLD AUTO: 31.4 % (ref 36.6–50.3)
HCT VFR BLD AUTO: 35 % (ref 36.6–50.3)
HGB BLD-MCNC: 10.6 G/DL (ref 12.1–17)
HGB BLD-MCNC: 12 G/DL (ref 12.1–17)
LYMPHOCYTES # BLD AUTO: 19 % (ref 12–49)
LYMPHOCYTES # BLD: 0.8 K/UL (ref 0.8–3.5)
MCH RBC QN AUTO: 31.5 PG (ref 26–34)
MCH RBC QN AUTO: 31.7 PG (ref 26–34)
MCHC RBC AUTO-ENTMCNC: 33.8 G/DL (ref 30–36.5)
MCHC RBC AUTO-ENTMCNC: 34.3 G/DL (ref 30–36.5)
MCV RBC AUTO: 92.3 FL (ref 80–99)
MCV RBC AUTO: 93.5 FL (ref 80–99)
MONOCYTES # BLD: 0.3 K/UL (ref 0–1)
MONOCYTES NFR BLD AUTO: 8 % (ref 5–13)
NEUTS SEG # BLD: 2.8 K/UL (ref 1.8–8)
NEUTS SEG NFR BLD AUTO: 68 % (ref 32–75)
PLATELET # BLD AUTO: 171 K/UL (ref 150–400)
PLATELET # BLD AUTO: 179 K/UL (ref 150–400)
RBC # BLD AUTO: 3.36 M/UL (ref 4.1–5.7)
RBC # BLD AUTO: 3.79 M/UL (ref 4.1–5.7)
SPECIMEN EXP DATE BLD: NORMAL
WBC # BLD AUTO: 4.1 K/UL (ref 4.1–11.1)
WBC # BLD AUTO: 7.4 K/UL (ref 4.1–11.1)

## 2017-07-18 PROCEDURE — 77030018781

## 2017-07-18 PROCEDURE — 99218 HC RM OBSERVATION: CPT

## 2017-07-18 PROCEDURE — 74011250637 HC RX REV CODE- 250/637: Performed by: INTERNAL MEDICINE

## 2017-07-18 PROCEDURE — 50200 RENAL BIOPSY PERQ: CPT

## 2017-07-18 PROCEDURE — 77030014006 HC SPNG HEMSTAT J&J -A

## 2017-07-18 PROCEDURE — 77030003503 HC NDL BIOP TISS BD -B

## 2017-07-18 PROCEDURE — 88313 SPECIAL STAINS GROUP 2: CPT | Performed by: INTERNAL MEDICINE

## 2017-07-18 PROCEDURE — 85025 COMPLETE CBC W/AUTO DIFF WBC: CPT | Performed by: INTERNAL MEDICINE

## 2017-07-18 PROCEDURE — 86900 BLOOD TYPING SEROLOGIC ABO: CPT | Performed by: INTERNAL MEDICINE

## 2017-07-18 PROCEDURE — 36415 COLL VENOUS BLD VENIPUNCTURE: CPT | Performed by: INTERNAL MEDICINE

## 2017-07-18 PROCEDURE — 85027 COMPLETE CBC AUTOMATED: CPT | Performed by: INTERNAL MEDICINE

## 2017-07-18 PROCEDURE — 88346 IMFLUOR 1ST 1ANTB STAIN PX: CPT | Performed by: INTERNAL MEDICINE

## 2017-07-18 PROCEDURE — 77030019698 HC SYR ANGI MDLON MRTM -A

## 2017-07-18 PROCEDURE — 88305 TISSUE EXAM BY PATHOLOGIST: CPT | Performed by: INTERNAL MEDICINE

## 2017-07-18 PROCEDURE — 74011250636 HC RX REV CODE- 250/636: Performed by: RADIOLOGY

## 2017-07-18 PROCEDURE — 88350 IMFLUOR EA ADDL 1ANTB STN PX: CPT | Performed by: INTERNAL MEDICINE

## 2017-07-18 RX ORDER — CLONIDINE HYDROCHLORIDE 0.1 MG/1
0.1 TABLET ORAL AS NEEDED
Status: DISCONTINUED | OUTPATIENT
Start: 2017-07-18 | End: 2017-07-19 | Stop reason: HOSPADM

## 2017-07-18 RX ORDER — LORATADINE 10 MG/1
10 TABLET ORAL DAILY
Status: DISCONTINUED | OUTPATIENT
Start: 2017-07-19 | End: 2017-07-19 | Stop reason: HOSPADM

## 2017-07-18 RX ORDER — VITAMIN E 268 MG
400 CAPSULE ORAL 2 TIMES DAILY
Status: DISCONTINUED | OUTPATIENT
Start: 2017-07-18 | End: 2017-07-19 | Stop reason: HOSPADM

## 2017-07-18 RX ORDER — SODIUM CHLORIDE 0.9 % (FLUSH) 0.9 %
5-10 SYRINGE (ML) INJECTION AS NEEDED
Status: DISCONTINUED | OUTPATIENT
Start: 2017-07-18 | End: 2017-07-19 | Stop reason: HOSPADM

## 2017-07-18 RX ORDER — SODIUM CHLORIDE 9 MG/ML
25 INJECTION, SOLUTION INTRAVENOUS CONTINUOUS
Status: DISCONTINUED | OUTPATIENT
Start: 2017-07-18 | End: 2017-07-18

## 2017-07-18 RX ORDER — ACETAMINOPHEN 500 MG
500 TABLET ORAL
Status: DISCONTINUED | OUTPATIENT
Start: 2017-07-18 | End: 2017-07-19 | Stop reason: HOSPADM

## 2017-07-18 RX ORDER — ALLOPURINOL 100 MG/1
100 TABLET ORAL
Status: DISCONTINUED | OUTPATIENT
Start: 2017-07-18 | End: 2017-07-19 | Stop reason: HOSPADM

## 2017-07-18 RX ORDER — MIDAZOLAM HYDROCHLORIDE 1 MG/ML
.5-1 INJECTION, SOLUTION INTRAMUSCULAR; INTRAVENOUS
Status: DISCONTINUED | OUTPATIENT
Start: 2017-07-18 | End: 2017-07-18

## 2017-07-18 RX ORDER — FENTANYL CITRATE 50 UG/ML
12.5-5 INJECTION, SOLUTION INTRAMUSCULAR; INTRAVENOUS
Status: DISCONTINUED | OUTPATIENT
Start: 2017-07-18 | End: 2017-07-18

## 2017-07-18 RX ADMIN — FENTANYL CITRATE 25 MCG: 50 INJECTION, SOLUTION INTRAMUSCULAR; INTRAVENOUS at 12:10

## 2017-07-18 RX ADMIN — FENTANYL CITRATE 25 MCG: 50 INJECTION, SOLUTION INTRAMUSCULAR; INTRAVENOUS at 12:00

## 2017-07-18 RX ADMIN — MIDAZOLAM HYDROCHLORIDE 1 MG: 1 INJECTION INTRAMUSCULAR; INTRAVENOUS at 12:10

## 2017-07-18 RX ADMIN — MIDAZOLAM HYDROCHLORIDE 1 MG: 1 INJECTION INTRAMUSCULAR; INTRAVENOUS at 12:05

## 2017-07-18 RX ADMIN — MIDAZOLAM HYDROCHLORIDE 1 MG: 1 INJECTION INTRAMUSCULAR; INTRAVENOUS at 12:00

## 2017-07-18 RX ADMIN — FENTANYL CITRATE 25 MCG: 50 INJECTION, SOLUTION INTRAMUSCULAR; INTRAVENOUS at 12:22

## 2017-07-18 RX ADMIN — MIDAZOLAM HYDROCHLORIDE 1 MG: 1 INJECTION INTRAMUSCULAR; INTRAVENOUS at 12:15

## 2017-07-18 RX ADMIN — FENTANYL CITRATE 25 MCG: 50 INJECTION, SOLUTION INTRAMUSCULAR; INTRAVENOUS at 12:05

## 2017-07-18 RX ADMIN — Medication 400 UNITS: at 18:13

## 2017-07-18 RX ADMIN — ALLOPURINOL 100 MG: 100 TABLET ORAL at 21:57

## 2017-07-18 RX ADMIN — MIDAZOLAM HYDROCHLORIDE 1 MG: 1 INJECTION INTRAMUSCULAR; INTRAVENOUS at 12:23

## 2017-07-18 RX ADMIN — SODIUM CHLORIDE 25 ML/HR: 900 INJECTION, SOLUTION INTRAVENOUS at 11:55

## 2017-07-18 NOTE — IP AVS SNAPSHOT
[address] [facility] 
[phone] Patient: Ade Umaña. MRN: [id number] QNC:8/96/0497 You are allergic to the following No active allergies Recent Documentation Height Weight BMI Smoking Status 1.829 m 93 kg 27.8 kg/m2 Never Smoker Emergency Contacts  (Rel.) Home Phone Work Phone Mobile Phone Lyndsey Pitts [phone] -- [phone] About your hospitalization You were admitted on:  July 18, 2017 You last received care in the:  [address] You were discharged on:  July 19, 2017 Why you were hospitalized Your primary diagnosis was:  Not on File Your diagnoses also included:  S/P Biopsy Providers Seen During Your Hospitalizations Provider Role Specialty Primary office phone Yumi Guerra MD Attending Provider Nephrology [phone] Your Primary Care Physician (PCP) Primary Care Physician Office Phone Office Fax Orquidea Cason [phone] Follow-up Information Follow up With Details Comments Contact Info Radha Badillo 
[address] [employer] [place] [facility] [address] [phone] Current Discharge Medication List  
  
ASK your doctor about these medications Dose & Instructions Dispensing Information Comments Morning Noon Evening Bedtime  
 allopurinol 100 mg tablet Commonly known as:  Hedy Shoemaker Your last dose was: Your next dose is:    
   
   
 Dose:  100 mg Take 100 mg by mouth nightly. Refills:  0  
     
   
   
   
  
 amLODIPine-benazepril 5-20 mg per capsule Commonly known as:  Danny Earl Your last dose was: Your next dose is:    
   
   
 Dose:  1 Cap Take 1 Cap by mouth daily. Refills:  0  
     
   
   
   
  
 aspirin delayed-release 81 mg tablet Your last dose was: Your next dose is:    
   
   
 Dose:  81 mg Take 81 mg by mouth daily. Refills:  0 CLARITIN 10 mg tablet Generic drug:  loratadine Your last dose was: Your next dose is:    
   
   
 Dose:  10 mg Take 10 mg by mouth daily. Refills:  0  
     
   
   
   
  
 folic acid 926 mcg tablet Your last dose was: Your next dose is:    
   
   
 Dose:  400 mcg Take 400 mcg by mouth daily. Refills:  0 KRILL OIL PO Your last dose was: Your next dose is:    
   
   
 Dose:  1 Tab Take 1 Tab by mouth daily. Refills:  0  
     
   
   
   
  
 OCUVITE tablet Generic drug:  vitamin a-vitamin c-vit e-min Your last dose was: Your next dose is:    
   
   
 Dose:  1 Tab Take 1 Tab by mouth daily. Refills:  0 PROCRIT INJECTION Your last dose was: Your next dose is:    
   
   
 by Injection route. Refills:  0  
     
   
   
   
  
 simvastatin 10 mg tablet Commonly known as:  ZOCOR Your last dose was: Your next dose is: TAKE 1 TABLET NIGHTLY Quantity:  90 Tab Refills:  2  
     
   
   
   
  
 vitamin E 400 unit capsule Commonly known as:  [address] Your last dose was: Your next dose is:    
   
   
 Dose:  400 Units Take 400 Units by mouth two (2) times a day. Refills:  0 Discharge Instructions None Discharge Orders None ACO Transitions of Care Introducing [facility] [address] offers a voluntary care coordination program to provide high quality service and care to Psychiatric fee-for-service beneficiaries. Yolande Ch was designed to help you enhance your health and well-being through the following services: ? Transitions of Care  support for individuals who are transitioning from one care setting to another (example: Hospital to home). ? Chronic and Complex Care Coordination  support for individuals and caregivers of those with serious or chronic illnesses or with more than one chronic (ongoing) condition and those who take a number of different medications. If you meet specific medical criteria, a [facility] [address] may call you directly to coordinate your care with your primary care physician and your other care providers. For questions about the [facility] programs, please, contact your physicians office. For general questions or additional information about Accountable Care Organizations: 
Please visit www.medicare.gov/acos. html or call 1-800-MEDICARE (6-624.879.8904) SIGFOXY users should call 3-572.535.5039. Local [facility] Announcement We are excited to announce that we are making your provider's discharge notes available to you in Local [facility]. You will see these notes when they are completed and signed by the physician that discharged you from your recent hospital stay. If you have any questions or concerns about any information you see in Local [facility], please call the Health Information Department where you were seen or reach out to your Primary Care Provider for more information about your plan of care. Introducing [facility] & HEALTH SERVICES! Dear Mahi Cartwright: Thank you for requesting a Local Labs account. Our records indicate that you already have an active Local Labs account. You can access your account anytime at [email or web address] Did you know that you can access your hospital and ER discharge instructions at any time in Local Labs? You can also review all of your test results from your hospital stay or ER visit. Additional Information If you have questions, please visit the Frequently Asked Questions section of the blinkbox website at [email or web address] [email or web address] [email or web address]/. Remember, MyChart is NOT to be used for urgent needs. For medical emergencies, dial 911. Now available from your iPhone and Android! General Information Please provide this summary of care documentation to your next provider. Patient Signature:  ____________________________________________________________ Date:  ____________________________________________________________  
  
Gabby Fines Provider Signature:  ____________________________________________________________ Date:  ____________________________________________________________

## 2017-07-18 NOTE — PROGRESS NOTES
Frequent vitals until 1700. Low sodium diet ordered for patient. Patient up out of bed at 1815 to use the restroom. Labs drawn at 1830. Will be due again at 0400. Patient eating dinner with wife at bedside.

## 2017-07-18 NOTE — PROGRESS NOTES
[facility] Renal progress note:    Pt with CKD-3 with increasing proteinuria.  Seen by Dr. Jammie Mercer as outpt from our group  Admitted for elective CT guided Renal biopsy    S/p Biopsy by Radiology earlier today around noon  Tolerated well    OBJECTIVE:  Gen- NAD  Visit Vitals    /79    Pulse (!) 56    Temp 97.9 °F (36.6 °C)    Resp 16    Ht 6' (1.829 m)    Wt 93 kg (205 lb)    SpO2 100%    BMI 27.8 kg/m2     Tr edema  Back- not examined (laying flat)    A:  Proteinuria  HTN    S/p renal biopsy    P:  Check CBC 6 hrs post biopsy around 6 PM  Repeat labs in AM  Tylenol prn for pain  Most home meds ordered  PRN clonidine for HTN  Strict bed rest- for 6 hrs and ad zabrina activity after that    D/W RN    Travis Soler MD  [facility]

## 2017-07-18 NOTE — PROGRESS NOTES
Name of procedure: [facility] renal biopsy    Complications, if any, r/t procedure: none    Sedation medications given: 5 mg Versed, 100 mcg Fentanyl     Sedation tolerated: well    VS : Stable     Post Procedure Care Needed/order sets in [facility]: see orders, Dr. Sara Mcguire to place admission orders    Pt tolerated procedure well. VSS. No C/O pain. Dressing to site d&I. No bleeding or hematoma noted to site. Pt lay flat X 6 hrs. NPO X 6 hrs. Bedside reprot given to Tomy Ashby [facility]. Floor RN to assume care of pt. NAD noted at time of transfer.

## 2017-07-18 NOTE — PROGRESS NOTES
Oncology Nursing Communication Tool      6:49 PM  7/18/2017     Bedside shift change report given to [facility], RN (incoming nurse) by Jacobo Barillas RN (outgoing nurse) on Theone Flies. a 80 y.o. male who was admitted on 7/18/2017  1:14 PM. Report included the following information SBAR, Kardex and OR Summary. Significant changes during shift: frequent vitals completed. Dressing c/d/i. Cbc drawn at [facility] for physician to address: none            Code Status: Prior     Infections: No current active infections     Allergies: Review of patient's allergies indicates no known allergies.      Current diet: DIET REGULAR 1.5 GM NA       Pain Controlled [x] yes [] no   Bowel Movement [] yes [] no   Last Bowel Movement (date)                  Vital Signs:   Patient Vitals for the past 12 hrs:   Temp Pulse Resp BP SpO2   07/18/17 1709 98.7 °F (37.1 °C) 61 16 151/84 100 %   07/18/17 1605 97.8 °F (36.6 °C) 60 16 139/85 99 %   07/18/17 1455 97.6 °F (36.4 °C) (!) 54 16 126/80 100 %   07/18/17 1435 97.9 °F (36.6 °C) (!) 56 16 130/79 100 %   07/18/17 1405 97.6 °F (36.4 °C) (!) 52 16 107/67 98 %   07/18/17 1350 97.6 °F (36.4 °C) (!) 52 16 113/69 98 %   07/18/17 1340 - (!) 51 - 109/68 -   07/18/17 1334 97.8 °F (36.6 °C) (!) 55 16 108/70 97 %   07/18/17 1320 97.5 °F (36.4 °C) (!) 56 16 112/71 99 %   07/18/17 1315 - 60 18 117/63 99 %   07/18/17 1300 - 60 18 121/62 99 %   07/18/17 1245 - 62 18 118/62 99 %   07/18/17 1230 - 65 16 124/66 99 %   07/18/17 1225 - 62 16 114/65 99 %   07/18/17 1220 - 63 16 117/65 99 %   07/18/17 1215 - 62 16 120/70 99 %   07/18/17 1210 - 60 16 124/65 99 %   07/18/17 1205 - 62 18 118/63 99 %   07/18/17 1200 - 64 18 134/75 99 %   07/18/17 1030 97.6 °F (36.4 °C) 68 18 142/73 99 %      Intake & Output:   No intake or output data in the 24 hours ending 07/18/17 3994   Laboratory Results:     Recent Results (from the past 12 hour(s))   CBC WITH AUTOMATED DIFF Collection Time: 07/18/17 10:45 AM   Result Value Ref Range    WBC 4.1 4.1 - 11.1 K/uL    RBC 3.36 (L) 4.10 - 5.70 M/uL    HGB 10.6 (L) 12.1 - 17.0 g/dL    HCT 31.4 (L) 36.6 - 50.3 %    MCV 93.5 80.0 - 99.0 FL    MCH 31.5 26.0 - 34.0 PG    MCHC 33.8 30.0 - 36.5 g/dL    RDW 14.7 (H) 11.5 - 14.5 %    PLATELET 376 477 - 367 K/uL    NEUTROPHILS 68 32 - 75 %    LYMPHOCYTES 19 12 - 49 %    MONOCYTES 8 5 - 13 %    EOSINOPHILS 4 0 - 7 %    BASOPHILS 1 0 - 1 %    ABS. NEUTROPHILS 2.8 1.8 - 8.0 K/UL    ABS. LYMPHOCYTES 0.8 0.8 - 3.5 K/UL    ABS. MONOCYTES 0.3 0.0 - 1.0 K/UL    ABS. EOSINOPHILS 0.2 0.0 - 0.4 K/UL    ABS. BASOPHILS 0.0 0.0 - 0.1 K/UL   TYPE & SCREEN    Collection Time: 07/18/17 10:45 AM   Result Value Ref Range    Crossmatch Expiration 07/21/2017     ABO/Rh(D) Patience Lagunas POSITIVE     Antibody screen NEG               Opportunity for questions and clarifications were given to the incoming nurse. Patient's bed is in low position, side rails x2, door open PRN, call bell within reach and patient not in distress.       Anat Monteiro RN

## 2017-07-18 NOTE — H&P
Radiology History and Physical    Patient: Janet Hancock. 80 y.o. male       Chief Complaint: No chief complaint on file. History of Present Illness: chronic renal disease     History:    Past Medical History:   Diagnosis Date    Advance directive discussed with patient     Allergic rhinitis     Anemia N[facility] Dr eRgina aldrich 10/30/09- 5/2014-6/25/15  5/4/17 [facility] from [facility] Cancer    Cancer [facility])     skin    Chronic kidney disease     dr Aileen Rosales stage 3  5/18/17 f/u note    Contact dermatitis and other eczema, due to unspecified cause     rosacea, and [place]    Gingivitis     History of chicken pox     Hypercholesterolemia     Hypertension     Renal cyst     woody  5/4/16    Screening for glaucoma 12/29/2016    nury lechuga note    Trauma      5 fx ribs and splenic injury 6/10     Family History   Problem Relation Age of Onset    Cancer Mother      lung smoker    Hypertension Mother     Cancer Father     Heart Disease Father     Diabetes Maternal Grandmother      Social History     Social History    Marital status:      Spouse name: N/A    Number of children: N/A    Years of education: N/A     Occupational History    Not on file.      Social History Main Topics    Smoking status: Never Smoker    Smokeless tobacco: Never Used    Alcohol use No    Drug use: No    Sexual activity: Yes     Partners: Female     Other Topics Concern    Not on file     Social History Narrative       Allergies: No Known Allergies    Current Medications:  Current Facility-Administered Medications   Medication Dose Route Frequency    sodium chloride (NS) flush 5-10 mL  5-10 mL IntraVENous PRN    cloNIDine HCl (CATAPRES) tablet 0.1 mg  0.1 mg Oral PRN    allopurinol (ZYLOPRIM) tablet 100 mg  100 mg Oral QHS    [START ON 7/19/2017] loratadine (CLARITIN) tablet 10 mg  10 mg Oral DAILY    vitamin E (AQUA GEMS) capsule 400 Units  400 Units Oral BID    acetaminophen (TYLENOL) tablet 500 mg  500 mg Oral Q4H PRN        Physical Exam:  Blood pressure 130/79, pulse (!) 56, temperature 97.9 °F (36.6 °C), resp. rate 16, height 6' (1.829 m), weight 93 kg (205 lb), SpO2 100 %. LUNG: clear to auscultation bilaterally, HEART: regular rate and rhythm, S1, S2 normal, no murmur, click, rub or gallop      Alerts:    Hospital Problems  Date Reviewed: 7/17/2017          Codes Class Noted POA    S/P biopsy ICD-10-CM: Z98.890  ICD-9-CM: V45.89  7/18/2017 Unknown              Laboratory:      Recent Labs      07/18/17   1045   HGB  10.6*   HCT  31.4*   WBC  4.1   PLT  171         Plan of Care/Planned Procedure:  Risks, benefits, and alternatives reviewed with patient and he agrees to proceed with the procedure.     Plan is for CT guided renal biopsy       Mariia Tate MD

## 2017-07-19 VITALS
RESPIRATION RATE: 16 BRPM | BODY MASS INDEX: 27.77 KG/M2 | OXYGEN SATURATION: 97 % | SYSTOLIC BLOOD PRESSURE: 130 MMHG | HEIGHT: 72 IN | DIASTOLIC BLOOD PRESSURE: 66 MMHG | WEIGHT: 205 LBS | TEMPERATURE: 98 F | HEART RATE: 67 BPM

## 2017-07-19 LAB
ERYTHROCYTE [DISTWIDTH] IN BLOOD BY AUTOMATED COUNT: 15 % (ref 11.5–14.5)
HCT VFR BLD AUTO: 29.5 % (ref 36.6–50.3)
HGB BLD-MCNC: 10.1 G/DL (ref 12.1–17)
MCH RBC QN AUTO: 32.4 PG (ref 26–34)
MCHC RBC AUTO-ENTMCNC: 34.2 G/DL (ref 30–36.5)
MCV RBC AUTO: 94.6 FL (ref 80–99)
PLATELET # BLD AUTO: 152 K/UL (ref 150–400)
RBC # BLD AUTO: 3.12 M/UL (ref 4.1–5.7)
WBC # BLD AUTO: 5.7 K/UL (ref 4.1–11.1)

## 2017-07-19 PROCEDURE — 85027 COMPLETE CBC AUTOMATED: CPT | Performed by: INTERNAL MEDICINE

## 2017-07-19 PROCEDURE — 74011250637 HC RX REV CODE- 250/637: Performed by: INTERNAL MEDICINE

## 2017-07-19 PROCEDURE — 99218 HC RM OBSERVATION: CPT

## 2017-07-19 PROCEDURE — 36415 COLL VENOUS BLD VENIPUNCTURE: CPT | Performed by: INTERNAL MEDICINE

## 2017-07-19 RX ADMIN — LORATADINE 10 MG: 10 TABLET ORAL at 08:55

## 2017-07-19 RX ADMIN — Medication 400 UNITS: at 08:55

## 2017-07-19 NOTE — PROGRESS NOTES
Patient was discharged with his wife. IV removed with tip intact. Discharge instructions reviewed. All questions answered.

## 2017-07-19 NOTE — PROGRESS NOTES
Pt. Was admitted under Observation for a renal biopsy because of CKD, stage III. Pt. Lives with his wife in [place] and is ambulatory. He has a hx. Of HTN, Hypercholesterolemia, Anemia, and CKD,stage III. He will likely be discharged today. Janett Jordan [facility],WN,[phone]--[phone]    Care Management Interventions  PCP Verified by CM: Yes  Mode of Transport at Discharge:  Other (see comment) (family)  Transition of Care Consult (CM Consult): Discharge Planning  MyChart Signup: No  Discharge Durable Medical Equipment: No  Health Maintenance Reviewed: Yes  Physical Therapy Consult: No  Occupational Therapy Consult: No  Speech Therapy Consult: No  Current Support Network: Lives with Spouse, Own Home  Confirm Follow Up Transport: Self

## 2017-07-19 NOTE — PROGRESS NOTES
Doing well this am  No c/o  Pinked tinged urine- clearing up  Vitals stable    OBJECTIVE:  Visit Vitals    /66 (BP 1 Location: Right arm, BP Patient Position: At rest)    Pulse 67    Temp 98 °F (36.7 °C)    Resp 16    Ht 6' (1.829 m)    Wt 93 kg (205 lb)    SpO2 97%    BMI 27.8 kg/m2     Back- no swelling, tenderness or oozing at R flank site  AOx3    Labs- Blood count stable. Last night, HCT was upto 35 -which could have been error  Otherwise hgb from 10.6>>10.1 this am    ASSESSMENT:  S/P renal biopsy for proteinuria    PLAN:  Home today. F/u with Dr. Yaron Way as outpt  Advised to avoid heavy lifting or strenuous activity for 2 weeks.  No more than 20 lbs wt lifting  Return to ER for worsening blood in urine, increasing flan pain, n/v, fever or other concerns    D/W pt and RN    Sarah Hollis MD  [facility]

## 2017-08-17 LAB — CREATININE, EXTERNAL: 2

## 2018-01-17 ENCOUNTER — OFFICE VISIT (OUTPATIENT)
Dept: FAMILY MEDICINE CLINIC | Age: 73
End: 2018-01-17

## 2018-01-17 VITALS
SYSTOLIC BLOOD PRESSURE: 153 MMHG | HEART RATE: 71 BPM | WEIGHT: 199.1 LBS | RESPIRATION RATE: 16 BRPM | DIASTOLIC BLOOD PRESSURE: 92 MMHG | BODY MASS INDEX: 29.49 KG/M2 | TEMPERATURE: 96.8 F | HEIGHT: 69 IN | OXYGEN SATURATION: 97 %

## 2018-01-17 DIAGNOSIS — N18.30 ANEMIA IN STAGE 3 CHRONIC KIDNEY DISEASE (HCC): ICD-10-CM

## 2018-01-17 DIAGNOSIS — D63.1 ANEMIA IN STAGE 3 CHRONIC KIDNEY DISEASE (HCC): ICD-10-CM

## 2018-01-17 DIAGNOSIS — N18.30 CHRONIC KIDNEY DISEASE, STAGE III (MODERATE) (HCC): ICD-10-CM

## 2018-01-17 DIAGNOSIS — E79.0 HYPERURICEMIA: ICD-10-CM

## 2018-01-17 DIAGNOSIS — R80.9 PROTEINURIA, UNSPECIFIED TYPE: ICD-10-CM

## 2018-01-17 DIAGNOSIS — Z00.00 MEDICARE ANNUAL WELLNESS VISIT, SUBSEQUENT: Primary | ICD-10-CM

## 2018-01-17 DIAGNOSIS — I10 ESSENTIAL HYPERTENSION: ICD-10-CM

## 2018-01-17 DIAGNOSIS — I10 ELEVATED BLOOD PRESSURE READING IN OFFICE WITH DIAGNOSIS OF HYPERTENSION: ICD-10-CM

## 2018-01-17 DIAGNOSIS — E78.00 PURE HYPERCHOLESTEROLEMIA: ICD-10-CM

## 2018-01-17 DIAGNOSIS — Z23 ENCOUNTER FOR IMMUNIZATION: ICD-10-CM

## 2018-01-17 DIAGNOSIS — E55.9 VITAMIN D DEFICIENCY: ICD-10-CM

## 2018-01-17 NOTE — LETTER
1/18/2018 2:44 PM 
 
Mr. Zara Bravo. 
[address] [place] [id number] Dear Zara Bravo.: 
 
Please find your most recent results below. Resulted Orders VITAMIN D, 25 HYDROXY Result Value Ref Range VITAMIN D, 25-HYDROXY 40.9 30.0 - 100.0 ng/mL Comment:  
   Vitamin D deficiency has been defined by the [address] [facility] practice guideline as a 
level of serum 25-OH vitamin D less than 20 ng/mL (1,2). The Endocrine Society went on to further define vitamin D 
insufficiency as a level between 21 and 29 ng/mL (2). 1. IOM ([place] [employer]). 2010. Dietary reference 
   intakes for calcium and D. [facility]: The 
   [employer]. 2. Luis M MF, Anamaria GALINDO, Vladimir LICEA, et al. 
   Evaluation, treatment, and prevention of vitamin D 
   deficiency: an Endocrine Society clinical practice 
   guideline. JCEM. 2011 Jul; 96(7):1911-30. Narrative Performed at:  [address]  [id number] : Evan Anand MD, Phone:  [phone] CBC WITH AUTOMATED DIFF Result Value Ref Range WBC 4.5 3.4 - 10.8 x10E3/uL  
 RBC 3.55 (L) 4.14 - 5.80 x10E6/uL HGB 10.7 (L) 13.0 - 17.7 g/dL HCT 32.1 (L) 37.5 - 51.0 % MCV 90 79 - 97 fL  
 MCH 30.1 26.6 - 33.0 pg  
 MCHC 33.3 31.5 - 35.7 g/dL  
 RDW 14.7 12.3 - 15.4 % PLATELET 671 (L) 246 - 379 x10E3/uL NEUTROPHILS 66 Not Estab. % Lymphocytes 20 Not Estab. % MONOCYTES 9 Not Estab. % EOSINOPHILS 5 Not Estab. % BASOPHILS 0 Not Estab. %  
 ABS. NEUTROPHILS 3.0 1.4 - 7.0 x10E3/uL Abs Lymphocytes 0.9 0.7 - 3.1 x10E3/uL  
 ABS. MONOCYTES 0.4 0.1 - 0.9 x10E3/uL  
 ABS. EOSINOPHILS 0.2 0.0 - 0.4 x10E3/uL  
 ABS. BASOPHILS 0.0 0.0 - 0.2 x10E3/uL IMMATURE GRANULOCYTES 0 Not Estab. %  
 ABS. IMM. GRANS. 0.0 0.0 - 0.1 x10E3/uL Narrative Performed at:  [place] [address] [address]  [id number] : Sasha Boothe MD, Phone:  [phone] URINALYSIS W/ RFLX MICROSCOPIC Result Value Ref Range Specific Gravity 1.013 1.005 - 1.030  
 pH (UA) 5.5 5.0 - 7.5 Color Yellow Yellow Appearance Clear Clear Leukocyte Esterase Negative Negative Protein 2+ (A) Negative/Trace Glucose Negative Negative Ketone Negative Negative Blood Negative Negative Bilirubin Negative Negative Urobilinogen 0.2 0.2 - 1.0 mg/dL Nitrites Negative Negative Microscopic Examination See additional order Comment:  
   Microscopic was indicated and was performed. Narrative Performed at:  [address]  [id number] : Sasha Boothe MD, Phone:  [phone] URIC ACID Result Value Ref Range Uric acid 6.2 3.7 - 8.6 mg/dL Comment:  
              Therapeutic target for gout patients: <6.0 Narrative Performed at:  [address]  [id number] : Sasha Boothe MD, Phone:  [phone] METABOLIC PANEL, COMPREHENSIVE Result Value Ref Range Glucose 99 65 - 99 mg/dL BUN 49 (H) 8 - 27 mg/dL Creatinine 2.57 (H) 0.76 - 1.27 mg/dL GFR est non-AA 24 (L) >59 mL/min/1.73 GFR est AA 28 (L) >59 mL/min/1.73  
 BUN/Creatinine ratio 19 10 - 24 Sodium 143 134 - 144 mmol/L Potassium 4.8 3.5 - 5.2 mmol/L Chloride 106 96 - 106 mmol/L  
 CO2 22 18 - 29 mmol/L Calcium 10.1 8.6 - 10.2 mg/dL Protein, total 7.2 6.0 - 8.5 g/dL Albumin 4.5 3.5 - 4.8 g/dL GLOBULIN, TOTAL 2.7 1.5 - 4.5 g/dL A-G Ratio 1.7 1.2 - 2.2 Bilirubin, total 0.5 0.0 - 1.2 mg/dL Alk. phosphatase 72 39 - 117 IU/L  
 AST (SGOT) 16 0 - 40 IU/L  
 ALT (SGPT) 14 0 - 44 IU/L Narrative Performed at:  [place] [address] [place] [address]  [id number] : Sasha Boothe MD, Phone:  [phone] LIPID PANEL Result Value Ref Range Cholesterol, total 139 100 - 199 mg/dL Triglyceride 183 (H) 0 - 149 mg/dL HDL Cholesterol 26 (L) >39 mg/dL VLDL, calculated 37 5 - 40 mg/dL LDL, calculated 76 0 - 99 mg/dL Narrative Performed at:  [address]  [id number] : Griselda Carmona MD, Phone:  [phone] MICROSCOPIC EXAMINATION Result Value Ref Range WBC 0-5 0 - 5 /hpf  
 RBC 0-2 0 - 2 /hpf Epithelial cells None seen 0 - 10 /hpf Casts None seen None seen /lpf Mucus Present Not Estab. Bacteria None seen None seen/Few Narrative Performed at:  [address]  [id number] : Griselda Carmona MD, Phone:  [phone] CKD REPORT Result Value Ref Range Interpretation Note Comment:  
   Supplemental report is available. Narrative Performed at:  [address] [place] [address]  [id number] : Jeny Rosales PhD, Phone:  [phone] CVD REPORT Result Value Ref Range INTERPRETATION Note Comment:  
   Supplemental report is available. PDF IMAGE Not applicable Narrative Performed at:  [address] [place] [address]  [id number] : Jeny Rosales PhD, Phone:  [phone] Stable anemia.  Stable proteinuria. Glo Ebbing function decreased.  Copy to neph.  Improved TriGlycerides.  Rest O.K. 
 
Please call me if you have any questions: [phone] Sincerely, Yumi Kenney MD  
Legend: Use this to help understand your labs. You may not have all of these ordered · WBC- White blood cell count · HGB- Hemoglobin, red blood cell count · HCT- Hematocrit, red blood cell count · PLT- Platelets · Sodium, Potassium, Chloride, Magnesium- electrolytes · Creatinine, GFR- kidney function · AST, ALT, Alkaline phosphatase, bilirubin- liver and gallbladder · Lipase- pancreas · RPR- Syphilis test, STD 
· HIV, Gonorrhea, Chlamydia, Trichomonas- STDs · Candida- yeast infection · Nuswab- vaginal infections · Urinalysis- a quick test about your urine · Hemoglobin A1C- diabetes test 
· Glucose- blood sugar · HDL- \"Good\" Cholesterol. Goal >=40 
· LDL- \"Bad\" Cholesterol. Goal <100 · Triglycerides- Goal <150 · Vit D- Vitamin D level · TSH, FT3, FT4- thyroid tests · HCV Ab- test for Hepatitis C

## 2018-01-17 NOTE — PATIENT INSTRUCTIONS
Vaccine Information Statement    Influenza (Flu) Vaccine (Inactivated or Recombinant): What you need to know    Many Vaccine Information Statements are available in Serbian and other languages. See www.immunize.org/vis  Hojas de Información Sobre Vacunas están disponibles en Español y en muchos otros idiomas. Visite www.immunize.org/vis    1. Why get vaccinated? Influenza (flu) is a contagious disease that spreads around the [place] every year, usually between October and May. Flu is caused by influenza viruses, and is spread mainly by coughing, sneezing, and close contact. Anyone can get flu. Flu strikes suddenly and can last several days. Symptoms vary by age, but can include:   fever/chills   sore throat   muscle aches   fatigue   cough   headache    runny or stuffy nose    Flu can also lead to pneumonia and blood infections, and cause diarrhea and seizures in children. If you have a medical condition, such as heart or lung disease, flu can make it worse. Flu is more dangerous for some people. Infants and young children, people 72years of age and older, pregnant women, and people with certain health conditions or a weakened immune system are at greatest risk. Each year thousands of people in the [facility] die from flu, and many more are hospitalized. Flu vaccine can:   keep you from getting flu,   make flu less severe if you do get it, and   keep you from spreading flu to your family and other people. 2. Inactivated and recombinant flu vaccines    A dose of flu vaccine is recommended every flu season. Children 6 months through 6years of age may need two doses during the same flu season. Everyone else needs only one dose each flu season.        Some inactivated flu vaccines contain a very small amount of a mercury-based preservative called thimerosal. Studies have not shown thimerosal in vaccines to be harmful, but flu vaccines that do not contain thimerosal are available. There is no live flu virus in flu shots. They cannot cause the flu. There are many flu viruses, and they are always changing. Each year a new flu vaccine is made to protect against three or four viruses that are likely to cause disease in the upcoming flu season. But even when the vaccine doesnt exactly match these viruses, it may still provide some protection    Flu vaccine cannot prevent:   flu that is caused by a virus not covered by the vaccine, or   illnesses that look like flu but are not. It takes about 2 weeks for protection to develop after vaccination, and protection lasts through the flu season. 3. Some people should not get this vaccine    Tell the person who is giving you the vaccine:     If you have any severe, life-threatening allergies. If you ever had a life-threatening allergic reaction after a dose of flu vaccine, or have a severe allergy to any part of this vaccine, you may be advised not to get vaccinated. Most, but not all, types of flu vaccine contain a small amount of egg protein.  If you ever had Guillain-Barré Syndrome (also called GBS). Some people with a history of GBS should not get this vaccine. This should be discussed with your doctor.  If you are not feeling well. It is usually okay to get flu vaccine when you have a mild illness, but you might be asked to come back when you feel better. 4. Risks of a vaccine reaction    With any medicine, including vaccines, there is a chance of reactions. These are usually mild and go away on their own, but serious reactions are also possible. Most people who get a flu shot do not have any problems with it.      Minor problems following a flu shot include:    soreness, redness, or swelling where the shot was given     hoarseness   sore, red or itchy eyes   cough   fever   aches   headache   itching   fatigue  If these problems occur, they usually begin soon after the shot and last 1 or 2 days. More serious problems following a flu shot can include the following:     There may be a small increased risk of Guillain-Barré Syndrome (GBS) after inactivated flu vaccine. This risk has been estimated at 1 or 2 additional cases per million people vaccinated. This is much lower than the risk of severe complications from flu, which can be prevented by flu vaccine.  Young children who get the flu shot along with pneumococcal vaccine (PCV13) and/or DTaP vaccine at the same time might be slightly more likely to have a seizure caused by fever. Ask your doctor for more information. Tell your doctor if a child who is getting flu vaccine has ever had a seizure. Problems that could happen after any injected vaccine:      People sometimes faint after a medical procedure, including vaccination. Sitting or lying down for about 15 minutes can help prevent fainting, and injuries caused by a fall. Tell your doctor if you feel dizzy, or have vision changes or ringing in the ears.  Some people get severe pain in the shoulder and have difficulty moving the arm where a shot was given. This happens very rarely.  Any medication can cause a severe allergic reaction. Such reactions from a vaccine are very rare, estimated at about 1 in a million doses, and would happen within a few minutes to a few hours after the vaccination. As with any medicine, there is a very remote chance of a vaccine causing a serious injury or death. The safety of vaccines is always being monitored. For more information, visit: www.cdc.gov/vaccinesafety/    5. What if there is a serious reaction? What should I look for?  Look for anything that concerns you, such as signs of a severe allergic reaction, very high fever, or unusual behavior.     Signs of a severe allergic reaction can include hives, swelling of the face and throat, difficulty breathing, a fast heartbeat, dizziness, and weakness  usually within a few minutes to a few hours after the vaccination. What should I do?  If you think it is a severe allergic reaction or other emergency that cant wait, call 9-1-1 and get the person to the nearest hospital. Otherwise, call your doctor.  Reactions should be reported to the Vaccine Adverse Event Reporting System (VAERS). Your doctor should file this report, or you can do it yourself through  the VAERS web site at www.vaers. [facility].gov, or by calling 4-420.602.2615. VAERS does not give medical advice. 6. The National Vaccine Injury Compensation Program    The [facility] Vaccine Injury Compensation Program (VICP) is a federal program that was created to compensate people who may have been injured by certain vaccines. Persons who believe they may have been injured by a vaccine can learn about the program and about filing a claim by calling 3-384.206.9610 or visiting the [employer] website at www.[facility].gov/vaccinecompensation. There is a time limit to file a claim for compensation. 7. How can I learn more?  Ask your healthcare provider. He or she can give you the vaccine package insert or suggest other sources of information.  Call your local or state health department.  Contact the Centers for Disease Control and Prevention (CDC):  - Call 3-117.776.5817 (1-800-CDC-INFO) or  - Visit CDCs website at www.cdc.gov/flu    Vaccine Information Statement   Inactivated Influenza Vaccine   8/7/2015  [address] [id number]    Department of Health and Human Services  Centers for Disease Control and Prevention    Office Use Only      Medicare Wellness Visit, Male    The best way to live healthy is to have a healthy lifestyle by eating a well-balanced diet, exercising regularly, limiting alcohol and stopping smoking. Regular physical exams and screening tests are another way to keep healthy. Preventive exams provided by your health care provider can find health problems before they become diseases or illnesses. Preventive services including immunizations, screening tests, monitoring and exams can help you take care of your own health. All people over age 72 should have a pneumovax  and and a prevnar shot to prevent pneumonia. These are once in a lifetime unless you and your provider decide differently. All people over 65 should have a yearly flu shot and a tetanus vaccine every 10 years. Screening for diabetes mellitus with a blood sugar test should be done every year. Glaucoma is a disease of the eye due to increased ocular pressure that can lead to blindness and it should be done every year by an eye professional.    Cardiovascular screening tests that check for elevated lipids (fatty part of blood) which can lead to heart disease and strokes should be done every 5 years. Colorectal screening that evaluates for blood or polyps in your colon should be done yearly as a stool test or every five years as a flexible sigmoidoscope or every 10 years as a colonoscopy up to age 76. Men up to age 76 may need a screening blood test for prostate cancer at certain intervals, depending on their personal and family history. This decision is between the patient and his provider. If you have been a smoker or had family history of abdominal aortic aneurysms, you and your provider may decide to schedule an ultrasound test of your aorta. Hepatitis C screening is also recommended for anyone born between [birth year] through [address]. A shingles vaccine is also recommended once in a lifetime after age 61. Your Medicare Wellness Exam is recommended annually.     Here is a list of your current Health Maintenance items with a due date:  Health Maintenance Due   Topic Date Due    DTaP/Tdap/Td  (1 - Tdap) 08/15/2007    Annual Well Visit  01/17/2018

## 2018-01-17 NOTE — MR AVS SNAPSHOT
[address] [place] [address] 
[address] [place] [address] 
[phone] Patient: Reid Daily MRN:  [facility]:[id number] Visit Information Date & Time Provider Department Dept. Phone Encounter #  
 1/17/2018  8:00 AM Florencia Hankins MD [facility] [phone] [id number] Follow-up Instructions Return in about 1 year (around 1/17/2019) for [address], annual visit, 40 min. Follow-up and Disposition History Upcoming Health Maintenance Date Due DTaP/Tdap/Td series (1 - Tdap) 8/15/2007 MEDICARE YEARLY EXAM 1/17/2018 GLAUCOMA SCREENING Q2Y 12/29/2018 COLONOSCOPY 6/16/2024 Allergies as of 1/17/2018  Review Complete On: 1/17/2018 By: Florencia Hankins MD  
 No Known Allergies Current Immunizations  Reviewed on 1/17/2018 Name Date Influenza High Dose Vaccine PF 1/17/2018, 9/21/2016 11:35 AM, 10/21/2015, 10/29/2014 Influenza Vaccine Split 9/26/2012, 11/16/2010 Pneumococcal Conjugate (PCV-13) 10/21/2015 Pneumococcal Polysaccharide (PPSV-23) 9/7/2012, 8/14/2007 TD Vaccine 8/14/2007 Zoster 9/15/2006 Reviewed by Esme Flores RN on 1/17/2018 at  8:33 AM  
You Were Diagnosed With   
  
 Codes Comments Medicare annual wellness visit, subsequent    -  Primary ICD-10-CM: Z00.00 ICD-9-CM: V70.0 Encounter for immunization     ICD-10-CM: D90 ICD-9-CM: V03.89 Pure hypercholesterolemia     ICD-10-CM: E78.00 ICD-9-CM: 272.0 Essential hypertension     ICD-10-CM: I10 
ICD-9-CM: 401.9 Hyperuricemia     ICD-10-CM: E79.0 ICD-9-CM: 790.6 Chronic kidney disease, stage III (moderate)     ICD-10-CM: N18.3 ICD-9-CM: 965. 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 
ICD-9-CM: 401.9 Vitals BP Pulse Temp Resp Height(growth percentile) Weight(growth percentile) (!) 153/92 (BP 1 Location: Left arm, BP Patient Position: Sitting) 71 96.8 °F (36 °C) (Oral) 16 5' 9\" (1.753 m) 199 lb 1.6 oz (90.3 kg) SpO2 BMI Smoking Status 97% 29.4 kg/m2 Never Smoker Vitals History BMI and BSA Data Body Mass Index Body Surface Area  
 29.4 kg/m 2 2.1 m 2 Preferred Pharmacy Pharmacy Name Phone [id number] [address] [phone] Your Updated Medication List  
  
   
This list is accurate as of: 1/17/18  9:11 AM.  Always use your most recent med list.  
  
  
  
  
 allopurinol 100 mg tablet Commonly known as:  Criselda Spaulding Take 100 mg by mouth nightly. amLODIPine-benazepril 5-20 mg per capsule Commonly known as:  Willadean Billet Take 1 Cap by mouth daily. aspirin delayed-release 81 mg tablet Take 81 mg by mouth daily. CLARITIN 10 mg tablet Generic drug:  loratadine Take 10 mg by mouth daily. KRILL OIL PO Take 1 Tab by mouth daily. OCUVITE tablet Generic drug:  vitamin a-vitamin c-vit e-min Take 1 Tab by mouth daily. PROCRIT INJECTION  
by Injection route. simvastatin 10 mg tablet Commonly known as:  ZOCOR Take 1 Tab by mouth nightly. vitamin E 400 unit capsule Commonly known as:  [address] Take 400 Units by mouth two (2) times a day. We Performed the Following ADMIN INFLUENZA VIRUS VAC [ [facility]] CBC WITH AUTOMATED DIFF [78453 CPT(R)] INFLUENZA VIRUS VACCINE, HIGH DOSE SEASONAL, PRESERVATIVE FREE [21270 CPT(R)] LIPID PANEL [33325 CPT(R)] METABOLIC PANEL, COMPREHENSIVE [20136 CPT(R)] URIC ACID [id number] CPT(R)] URINALYSIS W/ RFLX MICROSCOPIC [12286 CPT(R)] VITAMIN D, 25 HYDROXY [id number] CPT(R)] Follow-up Instructions Return in about 1 year (around 1/17/2019) for Ashu Fernandez, annual visit, 40 min. Patient Instructions Vaccine Information Statement Influenza (Flu) Vaccine (Inactivated or Recombinant): What you need to know Many Vaccine Information Statements are available in Ukrainian and other languages. See www.immunize.org/vis Hojas de Información Sobre Vacunas están disponibles en Español y en muchos otros idiomas. Visite www.immunize.org/vis 1. Why get vaccinated? Influenza (flu) is a contagious disease that spreads around the [facility] every year, usually between October and May. Flu is caused by influenza viruses, and is spread mainly by coughing, sneezing, and close contact. Anyone can get flu. Flu strikes suddenly and can last several days. Symptoms vary by age, but can include: 
 fever/chills  sore throat  muscle aches  fatigue  cough  headache  runny or stuffy nose Flu can also lead to pneumonia and blood infections, and cause diarrhea and seizures in children. If you have a medical condition, such as heart or lung disease, flu can make it worse. Flu is more dangerous for some people. Infants and young children, people 72years of age and older, pregnant women, and people with certain health conditions or a weakened immune system are at greatest risk. Each year thousands of people in the [facility] die from flu, and many more are hospitalized. Flu vaccine can: 
 keep you from getting flu, 
 make flu less severe if you do get it, and 
 keep you from spreading flu to your family and other people. 2. Inactivated and recombinant flu vaccines A dose of flu vaccine is recommended every flu season. Children 6 months through 6years of age may need two doses during the same flu season. Everyone else needs only one dose each flu season. Some inactivated flu vaccines contain a very small amount of a mercury-based preservative called thimerosal. Studies have not shown thimerosal in vaccines to be harmful, but flu vaccines that do not contain thimerosal are available. There is no live flu virus in flu shots. They cannot cause the flu. There are many flu viruses, and they are always changing. Each year a new flu vaccine is made to protect against three or four viruses that are likely to cause disease in the upcoming flu season. But even when the vaccine doesnt exactly match these viruses, it may still provide some protection Flu vaccine cannot prevent: 
 flu that is caused by a virus not covered by the vaccine, or 
 illnesses that look like flu but are not. It takes about 2 weeks for protection to develop after vaccination, and protection lasts through the flu season. 3. Some people should not get this vaccine Tell the person who is giving you the vaccine:  If you have any severe, life-threatening allergies. If you ever had a life-threatening allergic reaction after a dose of flu vaccine, or have a severe allergy to any part of this vaccine, you may be advised not to get vaccinated. Most, but not all, types of flu vaccine contain a small amount of egg protein.  If you ever had Guillain-Barré Syndrome (also called GBS). Some people with a history of GBS should not get this vaccine. This should be discussed with your doctor.  If you are not feeling well. It is usually okay to get flu vaccine when you have a mild illness, but you might be asked to come back when you feel better. 4. Risks of a vaccine reaction With any medicine, including vaccines, there is a chance of reactions. These are usually mild and go away on their own, but serious reactions are also possible. Most people who get a flu shot do not have any problems with it. Minor problems following a flu shot include:  soreness, redness, or swelling where the shot was given  hoarseness  sore, red or itchy eyes  cough  fever  aches  headache  itching  fatigue If these problems occur, they usually begin soon after the shot and last 1 or 2 days. More serious problems following a flu shot can include the following:  There may be a small increased risk of Guillain-Barré Syndrome (GBS) after inactivated flu vaccine. This risk has been estimated at 1 or 2 additional cases per million people vaccinated. This is much lower than the risk of severe complications from flu, which can be prevented by flu vaccine.  Young children who get the flu shot along with pneumococcal vaccine (PCV13) and/or DTaP vaccine at the same time might be slightly more likely to have a seizure caused by fever. Ask your doctor for more information. Tell your doctor if a child who is getting flu vaccine has ever had a seizure. Problems that could happen after any injected vaccine:  People sometimes faint after a medical procedure, including vaccination. Sitting or lying down for about 15 minutes can help prevent fainting, and injuries caused by a fall. Tell your doctor if you feel dizzy, or have vision changes or ringing in the ears.  Some people get severe pain in the shoulder and have difficulty moving the arm where a shot was given. This happens very rarely.  Any medication can cause a severe allergic reaction. Such reactions from a vaccine are very rare, estimated at about 1 in a million doses, and would happen within a few minutes to a few hours after the vaccination. As with any medicine, there is a very remote chance of a vaccine causing a serious injury or death. The safety of vaccines is always being monitored. For more information, visit: www.cdc.gov/vaccinesafety/ 
 
5. What if there is a serious reaction? What should I look for?  Look for anything that concerns you, such as signs of a severe allergic reaction, very high fever, or unusual behavior. Signs of a severe allergic reaction can include hives, swelling of the face and throat, difficulty breathing, a fast heartbeat, dizziness, and weakness  usually within a few minutes to a few hours after the vaccination. What should I do?  If you think it is a severe allergic reaction or other emergency that cant wait, call 9-1-1 and get the person to the nearest hospital. Otherwise, call your doctor.  Reactions should be reported to the Vaccine Adverse Event Reporting System (VAERS). Your doctor should file this report, or you can do it yourself through  the VAERS web site at www.vaers. [facility].gov, or by calling 1-545.340.5933. VAERS does not give medical advice. 6. The National Vaccine Injury Compensation Program 
 
The [facility] Vaccine Injury Compensation Program (VICP) is a federal program that was created to compensate people who may have been injured by certain vaccines. Persons who believe they may have been injured by a vaccine can learn about the program and about filing a claim by calling 6-399.711.1796 or visiting the [facility] website at www.[facility].gov/vaccinecompensation. There is a time limit to file a claim for compensation. 7. How can I learn more?  Ask your healthcare provider. He or she can give you the vaccine package insert or suggest other sources of information.  Call your local or state health department.  Contact the Centers for Disease Control and Prevention (CDC): 
- Call 4-858.338.2351 (1-800-CDC-INFO) or 
- Visit CDCs website at www.cdc.gov/flu Vaccine Information Statement Inactivated Influenza Vaccine 8/7/2015 
[address] [facility] [address]TY-90 Department of Health and [employer] Centers for Disease Control and Prevention Office Use Only Medicare Wellness Visit, Male The best way to live healthy is to have a healthy lifestyle by eating a well-balanced diet, exercising regularly, limiting alcohol and stopping smoking. Regular physical exams and screening tests are another way to keep healthy. Preventive exams provided by your health care provider can find health problems before they become diseases or illnesses. Preventive services including immunizations, screening tests, monitoring and exams can help you take care of your own health. All people over age 72 should have a pneumovax  and and a prevnar shot to prevent pneumonia. These are once in a lifetime unless you and your provider decide differently. All people over 65 should have a yearly flu shot and a tetanus vaccine every 10 years. Screening for diabetes mellitus with a blood sugar test should be done every year. Glaucoma is a disease of the eye due to increased ocular pressure that can lead to blindness and it should be done every year by an eye professional. 
 
Cardiovascular screening tests that check for elevated lipids (fatty part of blood) which can lead to heart disease and strokes should be done every 5 years. Colorectal screening that evaluates for blood or polyps in your colon should be done yearly as a stool test or every five years as a flexible sigmoidoscope or every 10 years as a colonoscopy up to age 76. Men up to age 76 may need a screening blood test for prostate cancer at certain intervals, depending on their personal and family history. This decision is between the patient and his provider. If you have been a smoker or had family history of abdominal aortic aneurysms, you and your provider may decide to schedule an ultrasound test of your aorta. Hepatitis C screening is also recommended for anyone born between [birth year] through [address]. A shingles vaccine is also recommended once in a lifetime after age 61. Your Medicare Wellness Exam is recommended annually. Here is a list of your current Health Maintenance items with a due date: 
Health Maintenance Due Topic Date Due  
 DTaP/Tdap/Td  (1 - Tdap) 08/15/2007 [facility] Annual Well Visit  01/17/2018 Patient Instructions History Introducing hospitals & HEALTH SERVICES! Dear Nataliia León: Thank you for requesting a [employer] account. Our records indicate that you already have an active [employer] account. You can access your account anytime at [email or web address] Did you know that you can access your hospital and ER discharge instructions at any time in [employer]? You can also review all of your test results from your hospital stay or ER visit. Additional Information If you have questions, please visit the Frequently Asked Questions section of the [employer] website at [email or web address] Remember, [employer] is NOT to be used for urgent needs. For medical emergencies, dial 911. Now available from your iPhone and Android! Please provide this summary of care documentation to your next provider. Your primary care clinician is listed as [id number] If you have any questions after today's visit, please call [phone].

## 2018-01-17 NOTE — ACP (ADVANCE CARE PLANNING)
Advance Care Planning    Advance Care Planning (ACP) Provider Conversation Snapshot    Date of ACP Conversation: 01/17/18  Persons included in Conversation:  patient  Length of ACP Conversation in minutes:  <16 minutes (Non-Billable)    Authorized Decision Maker (if patient is incapable of making informed decisions): This person is:    Other Legally Authorized Decision Maker (e.g. Next of Kin)  Wife        For Patients with Decision Making Capacity:   Values/Goals: Exploration of values, goals, and preferences if recovery is not expected, even with continued medical treatment in the event of:  Imminent death  Severe, permanent brain injury    Conversation Outcomes / Follow-Up Plan:   Recommended completion of Advance Directive form after review of ACP materials and conversation with prospective healthcare agent

## 2018-01-17 NOTE — PROGRESS NOTES
Pt here for annual visit and labs for chronic medical pbs of HTN, CKD, anemia, hyperuricemia, proteinuria. Sees neph, hematologist regularly. No pbs reported. No refills needed today. Fasting for annual labs. Visit Vitals    BP (!) 153/92 (BP 1 Location: Left arm, BP Patient Position: Sitting)    Pulse 71    Temp 96.8 °F (36 °C) (Oral)    Resp 16    Ht 5' 9\" (1.753 m)    Wt 199 lb 1.6 oz (90.3 kg)    SpO2 97%    BMI 29.4 kg/m2     Patient is alert and cooperative. Reviewed above. Assessment:   1. Hypercholesterolemia. 2. Hypertension, controlled. 3. Hyperuricemia. 4. CKD. Followed by nephrologist.    5. Proteinuria. Vitamin D deficiency  6. 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 Followed by hematologist.    7. Elevated blood pressure today. Plan:   1. Annual labs ordered. 2. Check outpatient blood pressure readings and follow up if persistently elevated. 3. High dose flu was given today. 4. Return in a year. 5. Follow up otherwise p.r.n.

## 2018-01-17 NOTE — PROGRESS NOTES
Chief Complaint   [facility]   [facility]     1. Have you been to the ER, urgent care clinic since your last visit? Hospitalized since your last visit? No    2. Have you seen or consulted any other health care providers outside of the [address] [place] [address] since your last visit? Include any pap smears or colon screening. No       I have reviewed Health Maintenance with the patient and updated. Has an Advance Directive. Complete Physical Exam Male  Pre-Visit Questions:    1. Do you follow a low fat or low salt diet ? y  2. Do you follow an exercise program? y  3. Have you had your tetanus booster in the last 10 years? n  4. Have you ever had a Pneumonia vaccine? y  11. Do you smoke? n  6. Do you consider yourself overweight? n  7. Do you perform Testicular self exam?y  8. Is there a family history of CAD< age 48? y  5. Is there a family history of Cancer? y  8. Do you have any Cancer risks? n  11. Have you had a colonoscopy? y  15. Have you been to your eye doctor past year?   y  15. Have you been to your dentist in the last 6 months?  y  15. Have you had your flu shot for this season?  n

## 2018-01-17 NOTE — PROGRESS NOTES
This is a Subsequent Medicare Annual Wellness Exam (AWV) (Performed 12 months after IPPE or effective date of Medicare Part B enrollment)    I have reviewed the patient's medical history in detail and updated the computerized patient record. Eye doctor past yr. Dentist 2 x annually. Gum doctor q 4 mos. Colonoscopy '14.  10 yr repeat. Heme q 2 weeks. Neph 2 x annually. No signif hx past yr. Discussed and declines LUCRETIA, PSA. Walks dog 4-5 x daily. No refills needed at present. History     Past Medical History:   Diagnosis Date    Advance directive discussed with patient     Allergic rhinitis     Anemia [facility] Dr Franky Mccracken too     federico aldrich 10/30/09- 5/2014-6/25/15  8/10/17 note from [facility]    Cancer [facility])     skin    Chronic kidney disease     dr Ashwin Watt stage 3  8/16/17 note and labs    Contact dermatitis and other eczema, due to unspecified cause     rosacea, and [place]    Gingivitis     History of chicken pox     Hypercholesterolemia     Hypertension     Renal cyst     woody  5/4/16    Screening for glaucoma 12/29/2016    nury lechuga note    Trauma      5 fx ribs and splenic injury 6/10      Past Surgical History:   Procedure Laterality Date    CT HEART W/O CONT WITH CALCIUM  12/08    score of 46    EGD  11/18/09    dr Ike Miranda grade 1 esophagitis    ENDOSCOPY, COLON, DIAGNOSTIC  6/16/2014 6/16/14 with dr. Aldrich/11/09 dr Ike Miranda diverticula and 10 year repeat     Current Outpatient Prescriptions   Medication Sig Dispense Refill    simvastatin (ZOCOR) 10 mg tablet Take 1 Tab by mouth nightly. 90 Tab 0    amLODIPine-benazepril (LOTREL) 5-20 mg per capsule Take 1 Cap by mouth daily.  EPOETIN KARIME (PROCRIT INJECTION) by Injection route.  KRILL OIL PO Take 1 Tab by mouth daily.  allopurinol (ZYLOPRIM) 100 mg tablet Take 100 mg by mouth nightly.  vitamin a-vitamin c-vit e-min (OCUVITE) tablet Take 1 Tab by mouth daily.         aspirin delayed-release 81 mg tablet Take 81 mg by mouth daily.  loratadine (CLARITIN) 10 mg tablet Take 10 mg by mouth daily.  vitamin E (AQUA GEMS) 400 unit capsule Take 400 Units by mouth two (2) times a day. No Known Allergies  Family History   Problem Relation Age of Onset    Cancer Mother      lung smoker    Hypertension Mother     Cancer Father     Heart Disease Father     Diabetes Maternal Grandmother      Social History   Substance Use Topics    Smoking status: Never Smoker    Smokeless tobacco: Never Used    Alcohol use No     Patient Active Problem List   Diagnosis Code    Pure hypercholesterolemia E78.00    Essential hypertension I10    Hyperuricemia E79.0    Chronic kidney disease, stage III (moderate) N18.3    Proteinuria R80.9    Vitamin D deficiency E55.9    Anemia in chronic renal disease N18.9, D63.1    Hyperlipidemia E78.5    Prostate cancer screening Z12.5    S/P biopsy Z98.890       Depression Risk Factor Screening:     PHQ over the last two weeks 1/17/2018   Little interest or pleasure in doing things Not at all   Feeling down, depressed or hopeless Not at all   Total Score PHQ 2 0     Alcohol Risk Factor Screening: You do not drink alcohol or very rarely. Functional Ability and Level of Safety:   Hearing Loss  Hearing is good. Activities of Daily Living  The home contains: [employer]  Patient does total self care    Fall Risk  Fall Risk Assessment, last 12 mths 1/17/2018   Able to walk? Yes   Fall in past 12 months?  No       Abuse Screen  Patient is not abused    Cognitive Screening   Evaluation of Cognitive Function:  Has your family/caregiver stated any concerns about your memory: no  Normal    Patient Care Team   Patient Care Team:  Francy Moura MD as PCP - Romel Menezes MD as Consulting Provider (Nephrology)  Estefany Gillis MD (Hematology and Oncology)  Pancho Schmitz RN as Ambulatory Care Navigator [facility])  Alfredo Cardona MD (Optometry)    Assessment/Plan   Education and counseling provided:  Are appropriate based on today's review and evaluation  End-of-Life planning (with patient's consent)  Pneumococcal Vaccine  Influenza Vaccine  Colorectal cancer screening tests  Cardiovascular screening blood test  Screening for glaucoma  Diabetes screening test    Diagnoses and all orders for this visit:    1. Medicare annual wellness visit, subsequent    2. Encounter for immunization  -     Influenza virus vaccine ([address]) 72 years and older (80258)  -     Administration fee () for Medicare insured patients    3. Pure hypercholesterolemia  -     LIPID PANEL    4. Essential hypertension  -     CBC WITH AUTOMATED DIFF  -     URINALYSIS W/ RFLX MICROSCOPIC  -     METABOLIC PANEL, COMPREHENSIVE  -     LIPID PANEL    5. Hyperuricemia  -     URIC ACID    6. Chronic kidney disease, stage III (moderate)  -     VITAMIN D, 25 HYDROXY  -     CBC WITH AUTOMATED DIFF  -     URINALYSIS W/ RFLX MICROSCOPIC  -     METABOLIC PANEL, COMPREHENSIVE    7. Proteinuria, unspecified type  -     URINALYSIS W/ RFLX MICROSCOPIC    8. Vitamin D deficiency  -     VITAMIN D, 25 HYDROXY    9. 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  Elevated blood pressure reading in office with diagnosis of hypertension        Health Maintenance Due   Topic Date Due    DTaP/Tdap/Td series (1 - Tdap) 08/15/2007    MEDICARE YEARLY EXAM  01/17/2018

## 2018-01-18 LAB
25(OH)D3+25(OH)D2 SERPL-MCNC: 40.9 NG/ML (ref 30–100)
ALBUMIN SERPL-MCNC: 4.5 G/DL (ref 3.5–4.8)
ALBUMIN/GLOB SERPL: 1.7 {RATIO} (ref 1.2–2.2)
ALP SERPL-CCNC: 72 IU/L (ref 39–117)
ALT SERPL-CCNC: 14 IU/L (ref 0–44)
APPEARANCE UR: CLEAR
AST SERPL-CCNC: 16 IU/L (ref 0–40)
BACTERIA #/AREA URNS HPF: NORMAL /[HPF]
BASOPHILS # BLD AUTO: 0 X10E3/UL (ref 0–0.2)
BASOPHILS NFR BLD AUTO: 0 %
BILIRUB SERPL-MCNC: 0.5 MG/DL (ref 0–1.2)
BILIRUB UR QL STRIP: NEGATIVE
BUN SERPL-MCNC: 49 MG/DL (ref 8–27)
BUN/CREAT SERPL: 19 (ref 10–24)
CALCIUM SERPL-MCNC: 10.1 MG/DL (ref 8.6–10.2)
CASTS URNS QL MICRO: NORMAL /LPF
CHLORIDE SERPL-SCNC: 106 MMOL/L (ref 96–106)
CHOLEST SERPL-MCNC: 139 MG/DL (ref 100–199)
CO2 SERPL-SCNC: 22 MMOL/L (ref 18–29)
COLOR UR: YELLOW
CREAT SERPL-MCNC: 2.57 MG/DL (ref 0.76–1.27)
EOSINOPHIL # BLD AUTO: 0.2 X10E3/UL (ref 0–0.4)
EOSINOPHIL NFR BLD AUTO: 5 %
EPI CELLS #/AREA URNS HPF: NORMAL /HPF
ERYTHROCYTE [DISTWIDTH] IN BLOOD BY AUTOMATED COUNT: 14.7 % (ref 12.3–15.4)
GLOBULIN SER CALC-MCNC: 2.7 G/DL (ref 1.5–4.5)
GLUCOSE SERPL-MCNC: 99 MG/DL (ref 65–99)
GLUCOSE UR QL: NEGATIVE
HCT VFR BLD AUTO: 32.1 % (ref 37.5–51)
HDLC SERPL-MCNC: 26 MG/DL
HGB BLD-MCNC: 10.7 G/DL (ref 13–17.7)
HGB UR QL STRIP: NEGATIVE
IMM GRANULOCYTES # BLD: 0 X10E3/UL (ref 0–0.1)
IMM GRANULOCYTES NFR BLD: 0 %
INTERPRETATION, 910389: NORMAL
INTERPRETATION: NORMAL
KETONES UR QL STRIP: NEGATIVE
LDLC SERPL CALC-MCNC: 76 MG/DL (ref 0–99)
LEUKOCYTE ESTERASE UR QL STRIP: NEGATIVE
LYMPHOCYTES # BLD AUTO: 0.9 X10E3/UL (ref 0.7–3.1)
LYMPHOCYTES NFR BLD AUTO: 20 %
MCH RBC QN AUTO: 30.1 PG (ref 26.6–33)
MCHC RBC AUTO-ENTMCNC: 33.3 G/DL (ref 31.5–35.7)
MCV RBC AUTO: 90 FL (ref 79–97)
MICRO URNS: ABNORMAL
MONOCYTES # BLD AUTO: 0.4 X10E3/UL (ref 0.1–0.9)
MONOCYTES NFR BLD AUTO: 9 %
MUCOUS THREADS URNS QL MICRO: PRESENT
NEUTROPHILS # BLD AUTO: 3 X10E3/UL (ref 1.4–7)
NEUTROPHILS NFR BLD AUTO: 66 %
NITRITE UR QL STRIP: NEGATIVE
PDF IMAGE, 910387: NORMAL
PH UR STRIP: 5.5 [PH] (ref 5–7.5)
PLATELET # BLD AUTO: 134 X10E3/UL (ref 150–379)
POTASSIUM SERPL-SCNC: 4.8 MMOL/L (ref 3.5–5.2)
PROT SERPL-MCNC: 7.2 G/DL (ref 6–8.5)
PROT UR QL STRIP: ABNORMAL
RBC # BLD AUTO: 3.55 X10E6/UL (ref 4.14–5.8)
RBC #/AREA URNS HPF: NORMAL /HPF
SODIUM SERPL-SCNC: 143 MMOL/L (ref 134–144)
SP GR UR: 1.01 (ref 1–1.03)
TRIGL SERPL-MCNC: 183 MG/DL (ref 0–149)
URATE SERPL-MCNC: 6.2 MG/DL (ref 3.7–8.6)
UROBILINOGEN UR STRIP-MCNC: 0.2 MG/DL (ref 0.2–1)
VLDLC SERPL CALC-MCNC: 37 MG/DL (ref 5–40)
WBC # BLD AUTO: 4.5 X10E3/UL (ref 3.4–10.8)
WBC #/AREA URNS HPF: NORMAL /HPF

## 2018-02-05 RX ORDER — SIMVASTATIN 10 MG/1
TABLET, FILM COATED ORAL
Qty: 90 TAB | Refills: 3 | Status: SHIPPED | OUTPATIENT
Start: 2018-02-05 | End: 2018-02-07 | Stop reason: SDUPTHER

## 2018-02-05 NOTE — TELEPHONE ENCOUNTER
PCP: Lolita Lira MD    Last appt: 1/17/2018  No future appointments.     Requested Prescriptions     Pending Prescriptions Disp Refills    simvastatin (ZOCOR) 10 mg tablet [Pharmacy Med Name: SIMVASTATIN 10 MG TABLET] 90 Tab 0     Sig: TAKE ONE TABLET BY MOUTH NIGHTLY     Lab Results   Component Value Date/Time    Sodium 143 01/17/2018 09:18 AM    Potassium 4.8 01/17/2018 09:18 AM    Chloride 106 01/17/2018 09:18 AM    CO2 22 01/17/2018 09:18 AM    Anion gap 12 09/07/2016 04:30 AM    Glucose 99 01/17/2018 09:18 AM    BUN 49 01/17/2018 09:18 AM    Creatinine 2.57 01/17/2018 09:18 AM    BUN/Creatinine ratio 19 01/17/2018 09:18 AM    GFR est AA 28 01/17/2018 09:18 AM    GFR est non-AA 24 01/17/2018 09:18 AM    Calcium 10.1 01/17/2018 09:18 AM     Lab Results   Component Value Date/Time    Hemoglobin A1c 5.6 09/09/2013 08:55 AM    Hemoglobin A1c (POC) 4.8 11/19/2015 03:13 PM     Lab Results   Component Value Date/Time    Cholesterol, total 139 01/17/2018 09:18 AM    HDL Cholesterol 26 01/17/2018 09:18 AM    LDL, calculated 76 01/17/2018 09:18 AM    VLDL, calculated 37 01/17/2018 09:18 AM    Triglyceride 183 01/17/2018 09:18 AM    CHOL/HDL Ratio 3.9 04/15/2010 09:00 AM     Lab Results   Component Value Date/Time    TSH 1.580 01/16/2017 11:45 AM

## 2018-02-07 NOTE — TELEPHONE ENCOUNTER
PCP: Lorena Branham MD    Last appt: 1/17/2018  No future appointments. Requested Prescriptions     Pending Prescriptions Disp Refills    simvastatin (ZOCOR) 10 mg tablet 90 Tab 3     Sig: TAKE ONE TABLET BY MOUTH NIGHTLY     Lab Results   Component Value Date/Time    Sodium 143 01/17/2018 09:18 AM    Potassium 4.8 01/17/2018 09:18 AM    Chloride 106 01/17/2018 09:18 AM    CO2 22 01/17/2018 09:18 AM    Anion gap 12 09/07/2016 04:30 AM    Glucose 99 01/17/2018 09:18 AM    BUN 49 (H) 01/17/2018 09:18 AM    Creatinine 2.57 (H) 01/17/2018 09:18 AM    BUN/Creatinine ratio 19 01/17/2018 09:18 AM    GFR est AA 28 (L) 01/17/2018 09:18 AM    GFR est non-AA 24 (L) 01/17/2018 09:18 AM    Calcium 10.1 01/17/2018 09:18 AM     Lab Results   Component Value Date/Time    Hemoglobin A1c 5.6 09/09/2013 08:55 AM    Hemoglobin A1c (POC) 4.8 11/19/2015 03:13 PM     Lab Results   Component Value Date/Time    Cholesterol, total 139 01/17/2018 09:18 AM    HDL Cholesterol 26 (L) 01/17/2018 09:18 AM    LDL, calculated 76 01/17/2018 09:18 AM    VLDL, calculated 37 01/17/2018 09:18 AM    Triglyceride 183 (H) 01/17/2018 09:18 AM    CHOL/HDL Ratio 3.9 04/15/2010 09:00 AM     Lab Results   Component Value Date/Time    TSH 1.580 01/16/2017 11:45 AM     New pharmacy for this patient.

## 2018-02-08 RX ORDER — SIMVASTATIN 10 MG/1
TABLET, FILM COATED ORAL
Qty: 90 TAB | Refills: 3 | Status: SHIPPED | OUTPATIENT
Start: 2018-02-08 | End: 2019-04-24 | Stop reason: SDUPTHER

## 2019-04-17 LAB — CREATININE, EXTERNAL: 3.98

## 2019-05-22 ENCOUNTER — OFFICE VISIT (OUTPATIENT)
Dept: FAMILY MEDICINE CLINIC | Age: 74
End: 2019-05-22

## 2019-05-22 VITALS
WEIGHT: 197.7 LBS | OXYGEN SATURATION: 96 % | RESPIRATION RATE: 20 BRPM | TEMPERATURE: 96.1 F | BODY MASS INDEX: 29.28 KG/M2 | HEIGHT: 69 IN | DIASTOLIC BLOOD PRESSURE: 70 MMHG | HEART RATE: 60 BPM | SYSTOLIC BLOOD PRESSURE: 146 MMHG

## 2019-05-22 DIAGNOSIS — E55.9 VITAMIN D DEFICIENCY: ICD-10-CM

## 2019-05-22 DIAGNOSIS — Z12.5 PROSTATE CANCER SCREENING: ICD-10-CM

## 2019-05-22 DIAGNOSIS — Z00.00 MEDICARE ANNUAL WELLNESS VISIT, SUBSEQUENT: Primary | ICD-10-CM

## 2019-05-22 DIAGNOSIS — E78.00 PURE HYPERCHOLESTEROLEMIA: ICD-10-CM

## 2019-05-22 DIAGNOSIS — I10 ESSENTIAL HYPERTENSION: ICD-10-CM

## 2019-05-22 DIAGNOSIS — E79.0 HYPERURICEMIA: ICD-10-CM

## 2019-05-22 DIAGNOSIS — R80.9 PROTEINURIA, UNSPECIFIED TYPE: ICD-10-CM

## 2019-05-22 DIAGNOSIS — I10 ELEVATED BLOOD PRESSURE READING IN OFFICE WITH DIAGNOSIS OF HYPERTENSION: ICD-10-CM

## 2019-05-22 DIAGNOSIS — N18.30 ANEMIA IN STAGE 3 CHRONIC KIDNEY DISEASE (HCC): ICD-10-CM

## 2019-05-22 DIAGNOSIS — Z71.89 ADVANCED DIRECTIVES, COUNSELING/DISCUSSION: ICD-10-CM

## 2019-05-22 DIAGNOSIS — I25.10 PLAQUE IN HEART ARTERY: ICD-10-CM

## 2019-05-22 DIAGNOSIS — D63.1 ANEMIA IN STAGE 3 CHRONIC KIDNEY DISEASE (HCC): ICD-10-CM

## 2019-05-22 DIAGNOSIS — N18.30 CHRONIC KIDNEY DISEASE, STAGE III (MODERATE) (HCC): ICD-10-CM

## 2019-05-22 RX ORDER — CARVEDILOL 6.25 MG/1
TABLET ORAL
Refills: 5 | COMMUNITY
Start: 2019-03-23

## 2019-05-22 RX ORDER — DOXYCYCLINE HYCLATE 100 MG
TABLET ORAL
Refills: 0 | COMMUNITY
Start: 2019-05-14 | End: 2019-06-03 | Stop reason: ALTCHOICE

## 2019-05-22 RX ORDER — AMLODIPINE BESYLATE 10 MG/1
TABLET ORAL
Refills: 5 | COMMUNITY
Start: 2019-03-23

## 2019-05-22 RX ORDER — TRIAMCINOLONE ACETONIDE 1 MG/G
CREAM TOPICAL
Refills: 1 | COMMUNITY
Start: 2019-05-14 | End: 2019-06-03

## 2019-05-22 NOTE — ACP (ADVANCE CARE PLANNING)
Advance Care Planning    Advance Care Planning (ACP) Provider Conversation Snapshot    Date of ACP Conversation: 05/22/19  Persons included in Conversation:  patient and family  Length of ACP Conversation in minutes:  <16 minutes (Non-Billable)    Authorized Decision Maker (if patient is incapable of making informed decisions): This person is:    Other Legally Authorized Decision Maker (e.g. Next of Kin)  Wife        For Patients with Decision Making Capacity:   Values/Goals: Exploration of values, goals, and preferences if recovery is not expected, even with continued medical treatment in the event of:  Imminent death  Severe, permanent brain injury    Conversation Outcomes / Follow-Up Plan:   Recommended completion of Advance Directive form after review of ACP materials and conversation with prospective healthcare agent

## 2019-05-22 NOTE — PROGRESS NOTES
Here for HTN f/u. BP at onc 130/70 last week. Sees neph q 3 mos. Trying to get into transplant program.  Sees heme every 2 weeks. Visit Vitals  /70 (BP 1 Location: Left arm, BP Patient Position: Sitting)   Pulse 60   Temp 96.1 °F (35.6 °C) (Oral)   Resp 20   Ht 5' 8.5\" (1.74 m)   Wt 197 lb 11.2 oz (89.7 kg)   SpO2 96%   BMI 29.62 kg/m²     Patient alert and cooperative. Reviewed above. Assessment:  1. HTN with persistent high office reading. Plan:  1. Return in a week for follow up. 2. Get fasting annual labs upon return. 3. Follow otherwise here prn. This is the Subsequent Medicare Annual Wellness Exam, performed 12 months or more after the Initial AWV or the last Subsequent AWV    I have reviewed the patient's medical history in detail and updated the computerized patient record. Eye doctor past yr. Dentist 2 x annually. Gum doctor q 4 mos. Derm q 4 mos for precancer, SCCA. Colonoscopy '14.  10 yr repeat. No signif hx past yr. History     Past Medical History:   Diagnosis Date    Advance directive discussed with patient     Allergic rhinitis     Anemia NEC Dr Jerson Costa too     federico aldrich 10/30/09- 5/2014-6/25/15  8/10/17 note from [facility]    Cancer [facility])     skin    Chronic kidney disease     dr Ilya Downs stage 3  8/16/17 note and labs    Contact dermatitis and other eczema, due to unspecified cause     rosacea, and [place]    Gingivitis     History of chicken pox     Hypercholesterolemia     Hypertension     Renal cyst     woody  5/4/16    Screening for glaucoma 12/29/2016    nury lechuga note    Trauma      5 fx ribs and splenic injury 6/10      Past Surgical History:   Procedure Laterality Date    CT HEART W/O CONT WITH CALCIUM  12/08    score of 46    EGD  11/18/09    dr Zoila Rodriguez grade 1 esophagitis    ENDOSCOPY, COLON, DIAGNOSTIC  6/16/2014 6/16/14 with dr. Aldrich/11/09 dr Zoila Rodriguez diverticula and 10 year repeat     Current Outpatient Medications   Medication Sig Dispense Refill    amLODIPine (NORVASC) 10 mg tablet TAKE 1 TABLET BY MOUTH EVERY DAY  5    carvedilol (COREG) 6.25 mg tablet TAKE 1 TABLET BY MOUTH TWICE A DAY WITH FOOD  5    doxycycline (VIBRA-TABS) 100 mg tablet TAKE 1 TABLET BY MOUTH TWICE A DAY FOR 10 DAYS  0    triamcinolone acetonide (KENALOG) 0.1 % topical cream APPLY TO PSORIASIS TWICE DAILY/ AS NEEDED TO THE RASH  1    simvastatin (ZOCOR) 10 mg tablet TAKE 1 TABLET BY MOUTH EVERY DAY AT NIGHT 30 Tab 0    EPOETIN KARIME (PROCRIT INJECTION) by Injection route.  KRILL OIL PO Take 1 Tab by mouth daily.  allopurinol (ZYLOPRIM) 100 mg tablet Take 100 mg by mouth nightly.  vitamin a-vitamin c-vit e-min (OCUVITE) tablet Take 1 Tab by mouth daily.  aspirin delayed-release 81 mg tablet Take 81 mg by mouth daily.  loratadine (CLARITIN) 10 mg tablet Take 10 mg by mouth daily.  vitamin E (AQUA GEMS) 400 unit capsule Take 400 Units by mouth two (2) times a day.        No Known Allergies  Family History   Problem Relation Age of Onset    Cancer Mother         lung smoker    Hypertension Mother     Cancer Father     Heart Disease Father     Diabetes Maternal Grandmother      Social History     Tobacco Use    Smoking status: Never Smoker    Smokeless tobacco: Never Used   Substance Use Topics    Alcohol use: No     Patient Active Problem List   Diagnosis Code    Pure hypercholesterolemia E78.00    Essential hypertension I10    Hyperuricemia E79.0    Chronic kidney disease, stage III (moderate) (HCC) N18.3    Proteinuria R80.9    Vitamin D deficiency E55.9    Anemia in chronic renal disease N18.9, D63.1    Prostate cancer screening Z12.5    S/P biopsy Z98.890    Elevated blood pressure reading in office with diagnosis of hypertension I10       Depression Risk Factor Screening:     3 most recent PHQ Screens 5/22/2019   Little interest or pleasure in doing things Not at all   Feeling down, depressed, irritable, or hopeless Not at all   Total Score PHQ 2 0     Alcohol Risk Factor Screening: You do not drink alcohol or very rarely. Functional Ability and Level of Safety:   Hearing Loss  The patient needs further evaluation. Activities of Daily Living  The home contains: handrails, grab bars and rugs  Patient does total self care    Fall Risk  Fall Risk Assessment, last 12 mths 5/22/2019   Able to walk? Yes   Fall in past 12 months? No       Abuse Screen  Patient is not abused    Cognitive Screening   Evaluation of Cognitive Function:  Has your family/caregiver stated any concerns about your memory: no  Normal    Patient Care Team   Patient Care Team:  Serafin Magana MD as PCP - General  Dakota Ash MD as Consulting Provider (Nephrology)  Narinder Hdz MD (Hematology and Oncology)  Brooke Jefferson RN as Ambulatory Care Navigator (Family Practice)  Alfredo Cardona MD (Optometry)    Assessment/Plan   Education and counseling provided:  Are appropriate based on today's review and evaluation  End-of-Life planning (with patient's consent)  Pneumococcal Vaccine  Influenza Vaccine  Prostate cancer screening tests (PSA, covered annually)  Colorectal cancer screening tests  Cardiovascular screening blood test  Screening for glaucoma  Diabetes screening test    Diagnoses and all orders for this visit:    1. Medicare annual wellness visit, subsequent    2. Advanced directives, counseling/discussion    3. Essential hypertension  -     CBC WITH AUTOMATED DIFF  -     URINALYSIS W/ RFLX MICROSCOPIC  -     TSH 3RD GENERATION  -     METABOLIC PANEL, COMPREHENSIVE  -     LIPID PANEL    4. Elevated blood pressure reading in office with diagnosis of hypertension    5. Anemia in stage 3 chronic kidney disease (HCC)  -     CBC WITH AUTOMATED DIFF    6.  Chronic kidney disease, stage III (moderate) (HCC)  -     CBC WITH AUTOMATED DIFF  -     URINALYSIS W/ RFLX MICROSCOPIC  -     METABOLIC PANEL, COMPREHENSIVE    7. Hyperuricemia  -     URIC ACID    8. Pure hypercholesterolemia  -     LIPID PANEL    9. Vitamin D deficiency    10. Proteinuria, unspecified type  -     URINALYSIS W/ RFLX MICROSCOPIC    11.  Prostate cancer screening  -     PSA SCREENING (SCREENING)    12. 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

## 2019-05-22 NOTE — PROGRESS NOTES
[facility]. Identified pt with two pt identifiers(name and ). Chief Complaint   Patient presents with    Blood Pressure Check    Labs       1. Have you been to the ER, urgent care clinic since your last visit? Hospitalized since your last visit? No    2. Have you seen or consulted any other health care providers outside of the [address] [place] [address] since your last visit? Include any pap smears or colon screening. Dr. Olegario Bonner and Dr. Cindy Burdick      Would you like to sign up for MyChart today, if you have not already done so? Patient has a mychart  If not, would you like information on MyChart, and how to sign up at a later time? No      Medication reconciliation up to date and corrected with patient at this time. Today's provider has been notified of reason for visit, vitals and flowsheets obtained on patients. Reviewed record in preparation for visit, huddled with provider and have obtained necessary documentation.       Health Maintenance Due   Topic    DTaP/Tdap/Td series (1 - Tdap)    GLAUCOMA SCREENING Q2Y     MEDICARE YEARLY EXAM        Wt Readings from Last 3 Encounters:   19 197 lb 11.2 oz (89.7 kg)   18 199 lb 1.6 oz (90.3 kg)   17 205 lb (93 kg)     Temp Readings from Last 3 Encounters:   19 96.1 °F (35.6 °C) (Oral)   18 96.8 °F (36 °C) (Oral)   17 98 °F (36.7 °C)     BP Readings from Last 3 Encounters:   19 150/78   18 (!) 153/92   17 130/66     Pulse Readings from Last 3 Encounters:   19 60   18 71   17 67     Vitals:    19 1113 19 1119   BP: 163/80 150/78   Pulse: 60    Resp: 20    Temp: 96.1 °F (35.6 °C)    TempSrc: Oral    SpO2: 96%    Weight: 197 lb 11.2 oz (89.7 kg)    Height: 5' 8.5\" (1.74 m)    PainSc:   0 - No pain          Learning Assessment:  :     Learning Assessment 2014   PRIMARY LEARNER Patient   PRIMARY LANGUAGE ENGLISH   LEARNER PREFERENCE PRIMARY VIDEOS     PICTURES DEMONSTRATION   ANSWERED BY patient   RELATIONSHIP SELF       Depression Screening:  :     3 most recent PHQ Screens 5/22/2019   Little interest or pleasure in doing things Not at all   Feeling down, depressed, irritable, or hopeless Not at all   Total Score PHQ 2 0       Fall Risk Assessment:  :     Fall Risk Assessment, last 12 mths 5/22/2019   Able to walk? Yes   Fall in past 12 months? No       Abuse Screening:  :     Abuse Screening Questionnaire 5/22/2019 1/17/2018   Do you ever feel afraid of your partner? N N   Are you in a relationship with someone who physically or mentally threatens you? N N   Is it safe for you to go home?  Y Y       ADL Screening:  :     ADL Assessment 5/22/2019   Feeding yourself No Help Needed   Getting from bed to chair No Help Needed   Getting dressed No Help Needed   Bathing or showering No Help Needed   Walk across the room (includes cane/walker) No Help Needed   Using the telphone No Help Needed   Taking your medications No Help Needed   Preparing meals No Help Needed   Managing money (expenses/bills) No Help Needed   Moderately strenuous housework (laundry) No Help Needed   Shopping for personal items (toiletries/medicines) No Help Needed   Shopping for groceries No Help Needed   Driving No Help Needed   Climbing a flight of stairs No Help Needed   Getting to places beyond walking distances No Help Needed

## 2019-05-22 NOTE — PATIENT INSTRUCTIONS
Medicare Wellness Visit, Male The best way to live healthy is to have a lifestyle where you eat a well-balanced diet, exercise regularly, limit alcohol use, and quit all forms of tobacco/nicotine, if applicable. Regular preventive services are another way to keep healthy. Preventive services (vaccines, screening tests, monitoring & exams) can help personalize your care plan, which helps you manage your own care. Screening tests can find health problems at the earliest stages, when they are easiest to treat. [facility] follows the current, evidence-based guidelines published by the [facility] ([facility]) when recommending preventive services for our patients. Because we follow these guidelines, sometimes recommendations change over time as research supports it. (For example, a prostate screening blood test is no longer routinely recommended for men with no symptoms.) Of course, you and your doctor may decide to screen more often for some diseases, based on your risk and co-morbidities (chronic disease you are already diagnosed with). Preventive services for you include: - Medicare offers their members a free annual wellness visit, which is time for you and your primary care provider to discuss and plan for your preventive service needs. Take advantage of this benefit every year! 
-All adults over age 72 should receive the recommended pneumonia vaccines. Current USPSTF guidelines recommend a series of two vaccines for the best pneumonia protection.  
-All adults should have a flu vaccine yearly and an ECG.  All adults age 61 and older should receive a shingles vaccine once in their lifetime.   
-All adults age 38-68 who are overweight should have a diabetes screening test once every three years.  
-Other screening tests & preventive services for persons with diabetes include: an eye exam to screen for diabetic retinopathy, a kidney function test, a foot exam, and stricter control over your cholesterol.  
-Cardiovascular screening for adults with routine risk involves an electrocardiogram (ECG) at intervals determined by the provider.  
-Colorectal cancer screening should be done for adults age 54-65 with no increased risk factors for colorectal cancer. There are a number of acceptable methods of screening for this type of cancer. Each test has its own benefits and drawbacks. Discuss with your provider what is most appropriate for you during your annual wellness visit. The different tests include: colonoscopy (considered the best screening method), a fecal occult blood test, a fecal DNA test, and sigmoidoscopy. 
-All adults born between [facility] should be screened once for Hepatitis C. 
-An Abdominal Aortic Aneurysm (AAA) Screening is recommended for men age 73-68 who has ever smoked in their lifetime. Here is a list of your current Health Maintenance items (your personalized list of preventive services) with a due date: 
Health Maintenance Due Topic Date Due  
 DTaP/Tdap/Td  (1 - Tdap) 08/15/2007  Glaucoma Screening   12/29/2018 [address] [place] [address] Annual Well Visit  01/18/2019

## 2019-06-03 ENCOUNTER — OFFICE VISIT (OUTPATIENT)
Dept: FAMILY MEDICINE CLINIC | Age: 74
End: 2019-06-03

## 2019-06-03 VITALS
HEART RATE: 59 BPM | OXYGEN SATURATION: 100 % | DIASTOLIC BLOOD PRESSURE: 84 MMHG | SYSTOLIC BLOOD PRESSURE: 150 MMHG | HEIGHT: 69 IN | RESPIRATION RATE: 20 BRPM | TEMPERATURE: 95.5 F | BODY MASS INDEX: 29.82 KG/M2 | WEIGHT: 201.3 LBS

## 2019-06-03 DIAGNOSIS — I10 ELEVATED BLOOD PRESSURE READING IN OFFICE WITH DIAGNOSIS OF HYPERTENSION: Primary | ICD-10-CM

## 2019-06-03 DIAGNOSIS — I10 ESSENTIAL HYPERTENSION: ICD-10-CM

## 2019-06-03 RX ORDER — LOSARTAN POTASSIUM 50 MG/1
50 TABLET ORAL DAILY
Qty: 30 TAB | Refills: 0 | Status: SHIPPED | OUTPATIENT
Start: 2019-06-03 | End: 2019-06-19 | Stop reason: SDUPTHER

## 2019-06-03 NOTE — PROGRESS NOTES
[facility]. Identified pt with two pt identifiers(name and ). Chief Complaint   Patient presents with    Blood Pressure Check    Labs       1. Have you been to the ER, urgent care clinic since your last visit? Hospitalized since your last visit? No    2. Have you seen or consulted any other health care providers outside of the [address] [place] [address] since your last visit? Include any pap smears or colon screening. NO      Would you like to sign up for MyChart today, if you have not already done so? Patient has mychart  If not, would you like information on MyChart, and how to sign up at a later time? No      Medication reconciliation up to date and corrected with patient at this time. Today's provider has been notified of reason for visit, vitals and flowsheets obtained on patients. Reviewed record in preparation for visit, huddled with provider and have obtained necessary documentation.       Health Maintenance Due   Topic    DTaP/Tdap/Td series (1 - Tdap)    GLAUCOMA SCREENING Q2Y        Wt Readings from Last 3 Encounters:   19 201 lb 4.8 oz (91.3 kg)   19 197 lb 11.2 oz (89.7 kg)   18 199 lb 1.6 oz (90.3 kg)     Temp Readings from Last 3 Encounters:   19 95.5 °F (35.3 °C) (Oral)   19 96.1 °F (35.6 °C) (Oral)   18 96.8 °F (36 °C) (Oral)     BP Readings from Last 3 Encounters:   19 162/90   19 146/70   18 (!) 153/92     Pulse Readings from Last 3 Encounters:   19 (!) 59   19 60   18 71     Vitals:    19 0900   BP: 162/90   Pulse: (!) 59   Resp: 20   Temp: 95.5 °F (35.3 °C)   TempSrc: Oral   SpO2: 100%   Weight: 201 lb 4.8 oz (91.3 kg)   Height: 5' 8.5\" (1.74 m)   PainSc:   0 - No pain         Learning Assessment:  :     Learning Assessment 2014   PRIMARY LEARNER Patient   PRIMARY LANGUAGE ENGLISH   LEARNER PREFERENCE PRIMARY VIDEOS     PICTURES     DEMONSTRATION   ANSWERED BY patient   RELATIONSHIP SELF Depression Screening:  :     3 most recent PHQ Screens 5/22/2019   Little interest or pleasure in doing things Not at all   Feeling down, depressed, irritable, or hopeless Not at all   Total Score PHQ 2 0       Fall Risk Assessment:  :     Fall Risk Assessment, last 12 mths 5/22/2019   Able to walk? Yes   Fall in past 12 months? No       Abuse Screening:  :     Abuse Screening Questionnaire 5/22/2019 1/17/2018   Do you ever feel afraid of your partner? N N   Are you in a relationship with someone who physically or mentally threatens you? N N   Is it safe for you to go home?  Y Y       ADL Screening:  :     ADL Assessment 5/22/2019   Feeding yourself No Help Needed   Getting from bed to chair No Help Needed   Getting dressed No Help Needed   Bathing or showering No Help Needed   Walk across the room (includes cane/walker) No Help Needed   Using the telphone No Help Needed   Taking your medications No Help Needed   Preparing meals No Help Needed   Managing money (expenses/bills) No Help Needed   Moderately strenuous housework (laundry) No Help Needed   Shopping for personal items (toiletries/medicines) No Help Needed   Shopping for groceries No Help Needed   Driving No Help Needed   Climbing a flight of stairs No Help Needed   Getting to places beyond walking distances No Help Needed

## 2019-06-03 NOTE — PROGRESS NOTES
Here for 1 week f/u of elevated BP. Still elevated. Doesn't remember being on clonidine nor ACEI in past '17 when BP was controlled and doesn't recall why not on these meds but does know that neph changed BP meds around in past.  Sees neph this aj. For now will add ARB at lower does and recheck 2 weeks. Visit Vitals  /84 (BP 1 Location: Left arm, BP Patient Position: Sitting)   Pulse (!) 59   Temp 95.5 °F (35.3 °C) (Oral)   Resp 20   Ht 5' 8.5\" (1.74 m)   Wt 201 lb 4.8 oz (91.3 kg)   SpO2 100%   BMI 30.16 kg/m²     Patient alert and cooperative. Reviewed above. Assessment:  1. HTN with high readings. Plan:  1. Add Cozaar 50 mg, one daily. 2. Return in two weeks for BP recheck. 3. Has follow up with nephrologist this month, who can also weigh in with recommendations. 4. Follow otherwise here prn.

## 2019-06-04 LAB
ALBUMIN SERPL-MCNC: 4 G/DL (ref 3.5–4.8)
ALBUMIN/GLOB SERPL: 1.5 {RATIO} (ref 1.2–2.2)
ALP SERPL-CCNC: 81 IU/L (ref 39–117)
ALT SERPL-CCNC: 16 IU/L (ref 0–44)
APPEARANCE UR: CLEAR
AST SERPL-CCNC: 19 IU/L (ref 0–40)
BACTERIA #/AREA URNS HPF: NORMAL /[HPF]
BASOPHILS # BLD AUTO: 0 X10E3/UL (ref 0–0.2)
BASOPHILS NFR BLD AUTO: 1 %
BILIRUB SERPL-MCNC: 0.4 MG/DL (ref 0–1.2)
BILIRUB UR QL STRIP: NEGATIVE
BUN SERPL-MCNC: 52 MG/DL (ref 8–27)
BUN/CREAT SERPL: 12 (ref 10–24)
CALCIUM SERPL-MCNC: 9.6 MG/DL (ref 8.6–10.2)
CASTS URNS QL MICRO: NORMAL /LPF
CHLORIDE SERPL-SCNC: 114 MMOL/L (ref 96–106)
CHOLEST SERPL-MCNC: 114 MG/DL (ref 100–199)
CO2 SERPL-SCNC: 19 MMOL/L (ref 20–29)
COLOR UR: YELLOW
CREAT SERPL-MCNC: 4.34 MG/DL (ref 0.76–1.27)
EOSINOPHIL # BLD AUTO: 0.3 X10E3/UL (ref 0–0.4)
EOSINOPHIL NFR BLD AUTO: 6 %
EPI CELLS #/AREA URNS HPF: NORMAL /HPF (ref 0–10)
ERYTHROCYTE [DISTWIDTH] IN BLOOD BY AUTOMATED COUNT: 16.1 % (ref 12.3–15.4)
GLOBULIN SER CALC-MCNC: 2.6 G/DL (ref 1.5–4.5)
GLUCOSE SERPL-MCNC: 101 MG/DL (ref 65–99)
GLUCOSE UR QL: NEGATIVE
HCT VFR BLD AUTO: 36.1 % (ref 37.5–51)
HDLC SERPL-MCNC: 30 MG/DL
HGB BLD-MCNC: 11.7 G/DL (ref 13–17.7)
HGB UR QL STRIP: NEGATIVE
IMM GRANULOCYTES # BLD AUTO: 0 X10E3/UL (ref 0–0.1)
IMM GRANULOCYTES NFR BLD AUTO: 0 %
INTERPRETATION, 910389: NORMAL
INTERPRETATION: NORMAL
KETONES UR QL STRIP: NEGATIVE
LDLC SERPL CALC-MCNC: 61 MG/DL (ref 0–99)
LEUKOCYTE ESTERASE UR QL STRIP: NEGATIVE
LYMPHOCYTES # BLD AUTO: 0.9 X10E3/UL (ref 0.7–3.1)
LYMPHOCYTES NFR BLD AUTO: 19 %
MCH RBC QN AUTO: 29.5 PG (ref 26.6–33)
MCHC RBC AUTO-ENTMCNC: 32.4 G/DL (ref 31.5–35.7)
MCV RBC AUTO: 91 FL (ref 79–97)
MICRO URNS: ABNORMAL
MONOCYTES # BLD AUTO: 0.3 X10E3/UL (ref 0.1–0.9)
MONOCYTES NFR BLD AUTO: 7 %
MUCOUS THREADS URNS QL MICRO: PRESENT
NEUTROPHILS # BLD AUTO: 3.1 X10E3/UL (ref 1.4–7)
NEUTROPHILS NFR BLD AUTO: 67 %
NITRITE UR QL STRIP: NEGATIVE
PDF IMAGE, 910387: NORMAL
PH UR STRIP: 6 [PH] (ref 5–7.5)
PLATELET # BLD AUTO: 140 X10E3/UL (ref 150–450)
POTASSIUM SERPL-SCNC: 4.9 MMOL/L (ref 3.5–5.2)
PROT SERPL-MCNC: 6.6 G/DL (ref 6–8.5)
PROT UR QL STRIP: ABNORMAL
PSA SERPL-MCNC: 1.3 NG/ML (ref 0–4)
RBC # BLD AUTO: 3.96 X10E6/UL (ref 4.14–5.8)
RBC #/AREA URNS HPF: NORMAL /HPF (ref 0–2)
SODIUM SERPL-SCNC: 147 MMOL/L (ref 134–144)
SP GR UR: 1.01 (ref 1–1.03)
TRIGL SERPL-MCNC: 117 MG/DL (ref 0–149)
TSH SERPL DL<=0.005 MIU/L-ACNC: 2.3 UIU/ML (ref 0.45–4.5)
URATE SERPL-MCNC: 4.9 MG/DL (ref 3.7–8.6)
UROBILINOGEN UR STRIP-MCNC: 0.2 MG/DL (ref 0.2–1)
VLDLC SERPL CALC-MCNC: 23 MG/DL (ref 5–40)
WBC # BLD AUTO: 4.7 X10E3/UL (ref 3.4–10.8)
WBC #/AREA URNS HPF: NORMAL /HPF (ref 0–5)

## 2019-06-19 ENCOUNTER — OFFICE VISIT (OUTPATIENT)
Dept: FAMILY MEDICINE CLINIC | Age: 74
End: 2019-06-19

## 2019-06-19 VITALS
TEMPERATURE: 96.3 F | RESPIRATION RATE: 20 BRPM | HEART RATE: 68 BPM | HEIGHT: 69 IN | DIASTOLIC BLOOD PRESSURE: 60 MMHG | OXYGEN SATURATION: 100 % | SYSTOLIC BLOOD PRESSURE: 138 MMHG | BODY MASS INDEX: 29.79 KG/M2 | WEIGHT: 201.1 LBS

## 2019-06-19 DIAGNOSIS — I10 ELEVATED BLOOD PRESSURE READING IN OFFICE WITH DIAGNOSIS OF HYPERTENSION: ICD-10-CM

## 2019-06-19 DIAGNOSIS — I10 ESSENTIAL HYPERTENSION: Primary | ICD-10-CM

## 2019-06-19 DIAGNOSIS — N18.30 CHRONIC KIDNEY DISEASE, STAGE III (MODERATE) (HCC): ICD-10-CM

## 2019-06-19 RX ORDER — LOSARTAN POTASSIUM 50 MG/1
50 TABLET ORAL DAILY
Qty: 90 TAB | Refills: 3 | Status: SHIPPED | OUTPATIENT
Start: 2019-06-19 | End: 2021-05-19

## 2019-06-19 NOTE — PROGRESS NOTES
Sees neph end of week. Reviewed recent labs. Note decreased kidney fcn to review with neph at upcoming appt. Patient denies chest pain, dyspnea, unexpected weight change, unexpected pain, mood or memory changes. Visit Vitals  /60 (BP 1 Location: Left arm, BP Patient Position: Sitting)   Pulse 68   Temp 96.3 °F (35.7 °C) (Oral)   Resp 20   Ht 5' 8.5\" (1.74 m)   Wt 201 lb 1.6 oz (91.2 kg)   SpO2 100%   BMI 30.13 kg/m²     Patient alert and cooperative. Reviewed above. Assessment:  1. HTN, still high readings in office. Plan:  1. If unable to get down, will increase the Cozaar from 50 to 100 mg pending nephrologist appointment end of this week. 2. Return in two weeks for BP check. 3. Follow otherwise here prn. BP better on recheck. Advised to continue current meds at current doses.

## 2019-06-19 NOTE — PROGRESS NOTES
Oumar 3599. Identified pt with two pt identifiers(name and ). Chief Complaint   Patient presents with    Blood Pressure Check    Results       1. Have you been to the ER, urgent care clinic since your last visit? Hospitalized since your last visit? No    2. Have you seen or consulted any other health care providers outside of the [address] [place] [address] since your last visit? Include any pap smears or colon screening. No      Would you like to sign up for MyChart today, if you have not already done so? Patient has a mychart  If not, would you like information on MyChart, and how to sign up at a later time? No      Medication reconciliation up to date and corrected with patient at this time. Today's provider has been notified of reason for visit, vitals and flowsheets obtained on patients. Reviewed record in preparation for visit, huddled with provider and have obtained necessary documentation.       Health Maintenance Due   Topic    DTaP/Tdap/Td series (1 - Tdap)    GLAUCOMA SCREENING Q2Y        Wt Readings from Last 3 Encounters:   19 201 lb 1.6 oz (91.2 kg)   19 201 lb 4.8 oz (91.3 kg)   19 197 lb 11.2 oz (89.7 kg)     Temp Readings from Last 3 Encounters:   19 95.5 °F (35.3 °C) (Oral)   19 96.1 °F (35.6 °C) (Oral)   18 96.8 °F (36 °C) (Oral)     BP Readings from Last 3 Encounters:   19 150/84   19 146/70   18 (!) 153/92     Pulse Readings from Last 3 Encounters:   19 (!) 59   19 60   18 71     Vitals:    19 0905   Weight: 201 lb 1.6 oz (91.2 kg)   Height: 5' 8.5\" (1.74 m)   PainSc:   0 - No pain         Learning Assessment:  :     Learning Assessment 2014   PRIMARY LEARNER Patient   PRIMARY LANGUAGE ENGLISH   LEARNER PREFERENCE PRIMARY VIDEOS     PICTURES     DEMONSTRATION   ANSWERED BY patient   RELATIONSHIP SELF       Depression Screening:  :     3 most recent PHQ Screens 2019   Little interest or pleasure in doing things Not at all   Feeling down, depressed, irritable, or hopeless Not at all   Total Score PHQ 2 0       Fall Risk Assessment:  :     Fall Risk Assessment, last 12 mths 5/22/2019   Able to walk? Yes   Fall in past 12 months? No       Abuse Screening:  :     Abuse Screening Questionnaire 5/22/2019 1/17/2018   Do you ever feel afraid of your partner? N N   Are you in a relationship with someone who physically or mentally threatens you? N N   Is it safe for you to go home?  Y Y       ADL Screening:  :     ADL Assessment 5/22/2019   Feeding yourself No Help Needed   Getting from bed to chair No Help Needed   Getting dressed No Help Needed   Bathing or showering No Help Needed   Walk across the room (includes cane/walker) No Help Needed   Using the telphone No Help Needed   Taking your medications No Help Needed   Preparing meals No Help Needed   Managing money (expenses/bills) No Help Needed   Moderately strenuous housework (laundry) No Help Needed   Shopping for personal items (toiletries/medicines) No Help Needed   Shopping for groceries No Help Needed   Driving No Help Needed   Climbing a flight of stairs No Help Needed   Getting to places beyond walking distances No Help Needed

## 2019-08-15 ENCOUNTER — HOSPITAL ENCOUNTER (OUTPATIENT)
Dept: PREADMISSION TESTING | Age: 74
Discharge: HOME | End: 2019-08-15
Attending: SURGERY
Payer: MEDICARE

## 2019-08-15 ENCOUNTER — HOSPITAL ENCOUNTER (OUTPATIENT)
Dept: GENERAL RADIOLOGY | Age: 74
Discharge: HOME | End: 2019-08-15
Attending: SURGERY
Payer: MEDICARE

## 2019-08-15 VITALS
DIASTOLIC BLOOD PRESSURE: 74 MMHG | BODY MASS INDEX: 28.53 KG/M2 | TEMPERATURE: 97.8 F | SYSTOLIC BLOOD PRESSURE: 147 MMHG | HEIGHT: 70 IN | RESPIRATION RATE: 18 BRPM | OXYGEN SATURATION: 100 % | HEART RATE: 59 BPM | WEIGHT: 199.3 LBS

## 2019-08-15 LAB
ALBUMIN SERPL-MCNC: 3.4 G/DL (ref 3.5–5)
ALBUMIN/GLOB SERPL: 0.9 {RATIO} (ref 1.1–2.2)
ALP SERPL-CCNC: 86 U/L (ref 45–117)
ALT SERPL-CCNC: 22 U/L (ref 12–78)
ANION GAP SERPL CALC-SCNC: 9 MMOL/L (ref 5–15)
APTT PPP: 22.2 SEC (ref 22.1–32)
AST SERPL-CCNC: 20 U/L (ref 15–37)
ATRIAL RATE: 56 BPM
BILIRUB SERPL-MCNC: 0.4 MG/DL (ref 0.2–1)
BUN SERPL-MCNC: 53 MG/DL (ref 6–20)
BUN/CREAT SERPL: 12 (ref 12–20)
CALCIUM SERPL-MCNC: 9.2 MG/DL (ref 8.5–10.1)
CALCULATED P AXIS, ECG09: 46 DEGREES
CALCULATED R AXIS, ECG10: 20 DEGREES
CALCULATED T AXIS, ECG11: 20 DEGREES
CHLORIDE SERPL-SCNC: 114 MMOL/L (ref 97–108)
CO2 SERPL-SCNC: 20 MMOL/L (ref 21–32)
CREAT SERPL-MCNC: 4.53 MG/DL (ref 0.7–1.3)
DIAGNOSIS, 93000: NORMAL
ERYTHROCYTE [DISTWIDTH] IN BLOOD BY AUTOMATED COUNT: 15.2 % (ref 11.5–14.5)
GLOBULIN SER CALC-MCNC: 3.7 G/DL (ref 2–4)
GLUCOSE SERPL-MCNC: 90 MG/DL (ref 65–100)
HCT VFR BLD AUTO: 30.5 % (ref 36.6–50.3)
HGB BLD-MCNC: 10.2 G/DL (ref 12.1–17)
INR PPP: 1 (ref 0.9–1.1)
MCH RBC QN AUTO: 30.4 PG (ref 26–34)
MCHC RBC AUTO-ENTMCNC: 33.4 G/DL (ref 30–36.5)
MCV RBC AUTO: 90.8 FL (ref 80–99)
NRBC # BLD: 0 K/UL (ref 0–0.01)
NRBC BLD-RTO: 0 PER 100 WBC
P-R INTERVAL, ECG05: 162 MS
PLATELET # BLD AUTO: 137 K/UL (ref 150–400)
PMV BLD AUTO: 10.9 FL (ref 8.9–12.9)
POTASSIUM SERPL-SCNC: 4.5 MMOL/L (ref 3.5–5.1)
PROT SERPL-MCNC: 7.1 G/DL (ref 6.4–8.2)
PROTHROMBIN TIME: 10.5 SEC (ref 9–11.1)
Q-T INTERVAL, ECG07: 406 MS
QRS DURATION, ECG06: 110 MS
QTC CALCULATION (BEZET), ECG08: 391 MS
RBC # BLD AUTO: 3.36 M/UL (ref 4.1–5.7)
SODIUM SERPL-SCNC: 143 MMOL/L (ref 136–145)
THERAPEUTIC RANGE,PTTT: NORMAL SECS (ref 58–77)
VENTRICULAR RATE, ECG03: 56 BPM
WBC # BLD AUTO: 4.9 K/UL (ref 4.1–11.1)

## 2019-08-15 PROCEDURE — 80053 COMPREHEN METABOLIC PANEL: CPT

## 2019-08-15 PROCEDURE — 36415 COLL VENOUS BLD VENIPUNCTURE: CPT

## 2019-08-15 PROCEDURE — 85730 THROMBOPLASTIN TIME PARTIAL: CPT

## 2019-08-15 PROCEDURE — 71046 X-RAY EXAM CHEST 2 VIEWS: CPT

## 2019-08-15 PROCEDURE — 85027 COMPLETE CBC AUTOMATED: CPT

## 2019-08-15 PROCEDURE — 93005 ELECTROCARDIOGRAM TRACING: CPT

## 2019-08-15 PROCEDURE — 85610 PROTHROMBIN TIME: CPT

## 2019-08-15 RX ORDER — SODIUM CHLORIDE 9 MG/ML
25 INJECTION, SOLUTION INTRAVENOUS CONTINUOUS
Status: CANCELLED | OUTPATIENT
Start: 2019-08-21

## 2019-08-15 RX ORDER — CEFAZOLIN SODIUM/WATER 2 G/20 ML
2 SYRINGE (ML) INTRAVENOUS ONCE
Status: CANCELLED | OUTPATIENT
Start: 2019-08-21 | End: 2019-08-21

## 2019-08-15 NOTE — PERIOP NOTES
[facility]  Preoperative Instructions        Surgery Date: Wednesday 8/21/19          Time of Arrival: 6:10 a.m. - Contact #:  [phone]    1. On the day of your surgery, please report to the Surgical Services Registration Desk and sign in at your designated time. The Surgery Center is located to the right of the Emergency Room. 2. You must have someone with you to drive you home. You should not drive a car for 24 hours following surgery. Please make arrangements for a friend or family member to stay with you for the first 24 hours after your surgery. 3. Do not have anything to eat or drink (including water, gum, mints, coffee, juice) after midnight  8/20/19   . ? This may not apply to medications prescribed by your physician. ?(Please note below the special instructions with medications to take the morning of your procedure.)    4. We recommend you do not drink any alcoholic beverages for 24 hours before and after your surgery. 5. Contact your surgeons office for instructions on the following medications: non-steroidal anti-inflammatory drugs (i.e. Advil, Aleve), vitamins, and supplements. (Some surgeons will want you to stop these medications prior to surgery and others may allow you to take them)  **If you are currently taking Plavix, Coumadin, Aspirin and/or other blood-thinning agents, contact your surgeon for instructions. ** Your surgeon will partner with the physician prescribing these medications to determine if it is safe to stop or if you need to continue taking. Please do not stop taking these medications without instructions from your surgeon    6. Wear comfortable clothes. Wear glasses instead of contacts. Do not bring any money or jewelry. Please bring picture ID, insurance card, and any prearranged co-payment or hospital payment. Do not wear make-up, particularly mascara the morning of your surgery.   Do not wear nail polish, particularly if you are having foot /hand surgery. Wear your hair loose or down, no ponytails, buns, radhika pins or clips. All body piercings must be removed. Please shower with antibacterial soap for three consecutive days before and on the morning of surgery, but do not apply any lotions, powders or deodorants after the shower on the day of surgery. Please use a fresh towels after each shower. Please sleep in clean clothes and change bed linens the night before surgery. Please do not shave for 48 hours prior to surgery. Shaving of the face is acceptable. 7. You should understand that if you do not follow these instructions your surgery may be cancelled. If your physical condition changes (I.e. fever, cold or flu) please contact your surgeon as soon as possible. 8. It is important that you be on time. If a situation occurs where you may be late, please call [phone] (OR Holding Area). 9. If you have any questions and or problems, please call [phone] (Pre-admission Testing). 10. Your surgery time may be subject to change. You will receive a phone call the evening prior if your time changes. 11.  If having outpatient surgery, you must have someone to drive you here, stay with you during the duration of your stay, and to drive you home at time of discharge. 12.   In an effort to improve the efficiency, privacy, and safety for all of our Pre-op patients visitors are not allowed in the Holding area. Once you arrive and are registered your family/visitors will be asked to remain in the waiting room. The Pre-op staff will get you from the Surgical Waiting Area and will explain to you and your family/visitors that the Pre-op phase is beginning. The staff will answer any questions and provide instructions for tracking of the patient, by use of the existing tracking number and color-coded status board in the waiting room.   At this time the staff will also ask for your designated spokesperson information in the event that the physician or staff need to provide an update or obtain any pertinent information. The designated spokesperson will be notified if the physician needs to speak to family during the pre-operative phase. If at any time your family/visitors has questions or concerns they may approach the volunteer desk in the waiting area for assistance. Special Instructions:  None. MEDICATIONS TO TAKE THE MORNING OF SURGERY WITH A SIP OF WATER: allopurinol, amlodipine, carvedilol, loratadine if needed. I understand a pre-operative phone call will be made to verify my surgery time. In the event that I am not available, I give permission for a message to be left on my answering service and/or with another person?   Yes         ___________________      __________   _________    (Signature of Patient)             (Witness)                (Date and Time)

## 2019-08-19 NOTE — PERIOP NOTES
Faxed records received from Dr. Michelle Funez, Dr. Roberto Mcnulty, and Dr. William Grewal. Records on chart to be scanned.

## 2019-08-21 ENCOUNTER — HOSPITAL ENCOUNTER (OUTPATIENT)
Age: 74
Setting detail: OUTPATIENT SURGERY
Discharge: HOME | End: 2019-08-21
Attending: SURGERY | Admitting: SURGERY
Payer: MEDICARE

## 2019-08-21 ENCOUNTER — ANESTHESIA EVENT (OUTPATIENT)
Dept: SURGERY | Age: 74
End: 2019-08-21
Payer: MEDICARE

## 2019-08-21 ENCOUNTER — ANESTHESIA (OUTPATIENT)
Dept: SURGERY | Age: 74
End: 2019-08-21
Payer: MEDICARE

## 2019-08-21 VITALS
HEIGHT: 70 IN | RESPIRATION RATE: 16 BRPM | DIASTOLIC BLOOD PRESSURE: 63 MMHG | SYSTOLIC BLOOD PRESSURE: 129 MMHG | BODY MASS INDEX: 28.09 KG/M2 | HEART RATE: 69 BPM | WEIGHT: 196.21 LBS | TEMPERATURE: 97.8 F | OXYGEN SATURATION: 98 %

## 2019-08-21 DIAGNOSIS — L76.82 PAIN AT SURGICAL INCISION: Primary | ICD-10-CM

## 2019-08-21 LAB
ANION GAP BLD CALC-SCNC: 17 MMOL/L (ref 10–20)
BUN BLD-MCNC: 59 MG/DL (ref 9–20)
CA-I BLD-MCNC: 1.27 MMOL/L (ref 1.12–1.32)
CHLORIDE BLD-SCNC: 115 MMOL/L (ref 98–107)
CO2 BLD-SCNC: 17 MMOL/L (ref 21–32)
CREAT BLD-MCNC: 6 MG/DL (ref 0.6–1.3)
GLUCOSE BLD-MCNC: 104 MG/DL (ref 65–100)
HCT VFR BLD CALC: 30 % (ref 36.6–50.3)
POTASSIUM BLD-SCNC: 4.3 MMOL/L (ref 3.5–5.1)
SERVICE CMNT-IMP: ABNORMAL
SODIUM BLD-SCNC: 144 MMOL/L (ref 136–145)

## 2019-08-21 PROCEDURE — 76060000032 HC ANESTHESIA 0.5 TO 1 HR: Performed by: SURGERY

## 2019-08-21 PROCEDURE — 74011250636 HC RX REV CODE- 250/636: Performed by: SURGERY

## 2019-08-21 PROCEDURE — 74011000272 HC RX REV CODE- 272: Performed by: SURGERY

## 2019-08-21 PROCEDURE — 74011250636 HC RX REV CODE- 250/636: Performed by: NURSE ANESTHETIST, CERTIFIED REGISTERED

## 2019-08-21 PROCEDURE — 77030002996 HC SUT SLK J&J -A: Performed by: SURGERY

## 2019-08-21 PROCEDURE — A4565 SLINGS: HCPCS

## 2019-08-21 PROCEDURE — 74011250636 HC RX REV CODE- 250/636: Performed by: ANESTHESIOLOGY

## 2019-08-21 PROCEDURE — 74011000250 HC RX REV CODE- 250: Performed by: SURGERY

## 2019-08-21 PROCEDURE — 77030002986 HC SUT PROL J&J -A: Performed by: SURGERY

## 2019-08-21 PROCEDURE — 77030002916 HC SUT ETHLN J&J -A: Performed by: SURGERY

## 2019-08-21 PROCEDURE — 77030020782 HC GWN BAIR PAWS FLX 3M -B

## 2019-08-21 PROCEDURE — 77030002933 HC SUT MCRYL J&J -A: Performed by: SURGERY

## 2019-08-21 PROCEDURE — 77030020256 HC SOL INJ NACL 0.9%  500ML: Performed by: SURGERY

## 2019-08-21 PROCEDURE — 77030018836 HC SOL IRR NACL ICUM -A: Performed by: SURGERY

## 2019-08-21 PROCEDURE — 77030031139 HC SUT VCRL2 J&J -A: Performed by: SURGERY

## 2019-08-21 PROCEDURE — 76210000021 HC REC RM PH II 0.5 TO 1 HR: Performed by: SURGERY

## 2019-08-21 PROCEDURE — 76010000138 HC OR TIME 0.5 TO 1 HR: Performed by: SURGERY

## 2019-08-21 PROCEDURE — 77030008463 HC STPLR SKN PROX J&J -B: Performed by: SURGERY

## 2019-08-21 PROCEDURE — 76210000006 HC OR PH I REC 0.5 TO 1 HR: Performed by: SURGERY

## 2019-08-21 PROCEDURE — 77030014008 HC SPNG HEMSTAT J&J -C: Performed by: SURGERY

## 2019-08-21 PROCEDURE — 77030002987 HC SUT PROL J&J -B: Performed by: SURGERY

## 2019-08-21 PROCEDURE — 74011250636 HC RX REV CODE- 250/636

## 2019-08-21 PROCEDURE — 77030011640 HC PAD GRND REM COVD -A: Performed by: SURGERY

## 2019-08-21 PROCEDURE — 80047 BASIC METABLC PNL IONIZED CA: CPT

## 2019-08-21 RX ORDER — LIDOCAINE HYDROCHLORIDE 20 MG/ML
INJECTION, SOLUTION EPIDURAL; INFILTRATION; INTRACAUDAL; PERINEURAL AS NEEDED
Status: DISCONTINUED | OUTPATIENT
Start: 2019-08-21 | End: 2019-08-21 | Stop reason: HOSPADM

## 2019-08-21 RX ORDER — TRAMADOL HYDROCHLORIDE 50 MG/1
50 TABLET ORAL
Qty: 15 TAB | Refills: 0 | Status: SHIPPED | OUTPATIENT
Start: 2019-08-21 | End: 2019-08-24

## 2019-08-21 RX ORDER — FENTANYL CITRATE 50 UG/ML
50 INJECTION, SOLUTION INTRAMUSCULAR; INTRAVENOUS AS NEEDED
Status: DISCONTINUED | OUTPATIENT
Start: 2019-08-21 | End: 2019-08-21 | Stop reason: HOSPADM

## 2019-08-21 RX ORDER — PROPOFOL 10 MG/ML
INJECTION, EMULSION INTRAVENOUS
Status: DISCONTINUED | OUTPATIENT
Start: 2019-08-21 | End: 2019-08-21 | Stop reason: HOSPADM

## 2019-08-21 RX ORDER — SODIUM CHLORIDE 9 MG/ML
25 INJECTION, SOLUTION INTRAVENOUS CONTINUOUS
Status: DISCONTINUED | OUTPATIENT
Start: 2019-08-21 | End: 2019-08-21 | Stop reason: HOSPADM

## 2019-08-21 RX ORDER — LIDOCAINE HYDROCHLORIDE 10 MG/ML
0.1 INJECTION, SOLUTION EPIDURAL; INFILTRATION; INTRACAUDAL; PERINEURAL AS NEEDED
Status: DISCONTINUED | OUTPATIENT
Start: 2019-08-21 | End: 2019-08-21 | Stop reason: HOSPADM

## 2019-08-21 RX ORDER — CEFAZOLIN SODIUM/WATER 2 G/20 ML
2 SYRINGE (ML) INTRAVENOUS ONCE
Status: COMPLETED | OUTPATIENT
Start: 2019-08-21 | End: 2019-08-21

## 2019-08-21 RX ORDER — MIDAZOLAM HYDROCHLORIDE 1 MG/ML
1 INJECTION, SOLUTION INTRAMUSCULAR; INTRAVENOUS AS NEEDED
Status: DISCONTINUED | OUTPATIENT
Start: 2019-08-21 | End: 2019-08-21 | Stop reason: HOSPADM

## 2019-08-21 RX ORDER — SODIUM CHLORIDE, SODIUM LACTATE, POTASSIUM CHLORIDE, CALCIUM CHLORIDE 600; 310; 30; 20 MG/100ML; MG/100ML; MG/100ML; MG/100ML
25 INJECTION, SOLUTION INTRAVENOUS CONTINUOUS
Status: DISCONTINUED | OUTPATIENT
Start: 2019-08-21 | End: 2019-08-21 | Stop reason: HOSPADM

## 2019-08-21 RX ORDER — ONDANSETRON 2 MG/ML
4 INJECTION INTRAMUSCULAR; INTRAVENOUS AS NEEDED
Status: DISCONTINUED | OUTPATIENT
Start: 2019-08-21 | End: 2019-08-21 | Stop reason: HOSPADM

## 2019-08-21 RX ORDER — ROPIVACAINE HYDROCHLORIDE 5 MG/ML
INJECTION, SOLUTION EPIDURAL; INFILTRATION; PERINEURAL AS NEEDED
Status: DISCONTINUED | OUTPATIENT
Start: 2019-08-21 | End: 2019-08-21 | Stop reason: HOSPADM

## 2019-08-21 RX ORDER — HEPARIN SODIUM 1000 [USP'U]/ML
INJECTION, SOLUTION INTRAVENOUS; SUBCUTANEOUS AS NEEDED
Status: DISCONTINUED | OUTPATIENT
Start: 2019-08-21 | End: 2019-08-21 | Stop reason: HOSPADM

## 2019-08-21 RX ORDER — MIDAZOLAM HYDROCHLORIDE 1 MG/ML
INJECTION, SOLUTION INTRAMUSCULAR; INTRAVENOUS AS NEEDED
Status: DISCONTINUED | OUTPATIENT
Start: 2019-08-21 | End: 2019-08-21 | Stop reason: HOSPADM

## 2019-08-21 RX ORDER — FENTANYL CITRATE 50 UG/ML
25 INJECTION, SOLUTION INTRAMUSCULAR; INTRAVENOUS
Status: DISCONTINUED | OUTPATIENT
Start: 2019-08-21 | End: 2019-08-21 | Stop reason: HOSPADM

## 2019-08-21 RX ORDER — PROPOFOL 10 MG/ML
INJECTION, EMULSION INTRAVENOUS AS NEEDED
Status: DISCONTINUED | OUTPATIENT
Start: 2019-08-21 | End: 2019-08-21 | Stop reason: HOSPADM

## 2019-08-21 RX ORDER — MORPHINE SULFATE 10 MG/ML
2 INJECTION, SOLUTION INTRAMUSCULAR; INTRAVENOUS
Status: DISCONTINUED | OUTPATIENT
Start: 2019-08-21 | End: 2019-08-21 | Stop reason: HOSPADM

## 2019-08-21 RX ADMIN — LIDOCAINE HYDROCHLORIDE 40 MG: 20 INJECTION, SOLUTION EPIDURAL; INFILTRATION; INTRACAUDAL; PERINEURAL at 08:12

## 2019-08-21 RX ADMIN — SODIUM CHLORIDE 25 ML/HR: 900 INJECTION, SOLUTION INTRAVENOUS at 07:25

## 2019-08-21 RX ADMIN — MIDAZOLAM HYDROCHLORIDE 1 MG: 1 INJECTION INTRAMUSCULAR; INTRAVENOUS at 08:10

## 2019-08-21 RX ADMIN — LIDOCAINE HYDROCHLORIDE 15 ML: 20 INJECTION, SOLUTION INTRAVENOUS at 07:41

## 2019-08-21 RX ADMIN — ROPIVACAINE HYDROCHLORIDE 15 ML: 5 INJECTION, SOLUTION EPIDURAL; INFILTRATION; PERINEURAL at 07:41

## 2019-08-21 RX ADMIN — PROPOFOL 50 MCG/KG/MIN: 10 INJECTION, EMULSION INTRAVENOUS at 08:10

## 2019-08-21 RX ADMIN — PHENYLEPHRINE HYDROCHLORIDE 20 MCG/MIN: 10 INJECTION INTRAVENOUS at 08:26

## 2019-08-21 RX ADMIN — HEPARIN SODIUM 5000 UNITS: 1000 INJECTION, SOLUTION INTRAVENOUS; SUBCUTANEOUS at 08:35

## 2019-08-21 RX ADMIN — MIDAZOLAM HYDROCHLORIDE 1 MG: 1 INJECTION INTRAMUSCULAR; INTRAVENOUS at 08:36

## 2019-08-21 RX ADMIN — Medication 2 G: at 08:06

## 2019-08-21 RX ADMIN — MIDAZOLAM HYDROCHLORIDE 2 MG: 1 INJECTION INTRAMUSCULAR; INTRAVENOUS at 07:39

## 2019-08-21 RX ADMIN — PROPOFOL 20 MG: 10 INJECTION, EMULSION INTRAVENOUS at 08:17

## 2019-08-21 RX ADMIN — SODIUM CHLORIDE: 900 INJECTION, SOLUTION INTRAVENOUS at 08:06

## 2019-08-21 NOTE — BRIEF OP NOTE
BRIEF OPERATIVE NOTE    Date of Procedure: 8/21/2019   Preoperative Diagnosis: END STAGE RENAL DISEASE  Postoperative Diagnosis: END STAGE RENAL DISEASE    Procedure(s): LEFT ARM AV FISTULA (proximal radiocephalic)  Surgeon: Alphonso Gomez MD  Anesthesia: block   Estimated Blood Loss: none  Specimens: none   Findings: adequate vein   Complications: none  Implants: none

## 2019-08-21 NOTE — ANESTHESIA POSTPROCEDURE EVALUATION
Procedure(s):  LEFT ARM ARTERIO VENOUS FISTULA CREATION. regional    Anesthesia Post Evaluation        Patient location during evaluation: PACU  Note status: Adequate. Level of consciousness: responsive to verbal stimuli and sleepy but conscious  Pain management: satisfactory to patient  Airway patency: patent  Anesthetic complications: no  Cardiovascular status: acceptable  Respiratory status: acceptable  Hydration status: acceptable  Comments: +Post-Anesthesia Evaluation and Assessment    Patient: Bay Her MRN: [id number]  SSN: [id number]   YOB: 1945  Age: 68 y.o. Sex: male      Cardiovascular Function/Vital Signs    /63   Pulse 69   Temp 36.6 °C (97.8 °F)   Resp 16   Ht 5' 10\" (1.778 m)   Wt 89 kg (196 lb 3.4 oz)   SpO2 98%   BMI 28.15 kg/m²     Patient is status post Procedure(s):  LEFT ARM ARTERIO VENOUS FISTULA CREATION. Nausea/Vomiting: Controlled. Postoperative hydration reviewed and adequate. Pain:  Pain Scale 1: Numeric (0 - 10) (08/21/19 0943)  Pain Intensity 1: 0 (08/21/19 0943)   Managed. Neurological Status:   Neuro (WDL): Within Defined Limits (08/21/19 0943)   At baseline. Mental Status and Level of Consciousness: Arousable. Pulmonary Status:   O2 Device: Room air (08/21/19 0943)   Adequate oxygenation and airway patent. Complications related to anesthesia: None    Post-anesthesia assessment completed. No concerns. Signed By: Roshan Bravo MD    8/21/2019  Post anesthesia nausea and vomiting:  controlled      Vitals Value Taken Time   /69 8/21/2019  9:40 AM   Temp 36.5 °C (97.7 °F) 8/21/2019  9:42 AM   Pulse 68 8/21/2019  9:43 AM   Resp 19 8/21/2019  9:43 AM   SpO2 94 % 8/21/2019  9:43 AM   Vitals shown include unvalidated device data.

## 2019-08-21 NOTE — PROGRESS NOTES
TRANSFER - OUT REPORT:    Verbal report given to [address] Porfirio Young RN(name) on [employer].  being transferred to Phase II (unit) for routine progression of care       Report consisted of patients Situation, Background, Assessment and   Recommendations(SBAR). Information from the following report(s) SBAR, OR Summary, Procedure Summary, Intake/Output and MAR was reviewed with the receiving nurse. Opportunity for questions and clarification was provided.       Patient transported with:   Registered Nurse

## 2019-08-21 NOTE — ANESTHESIA PROCEDURE NOTES
Peripheral Block    Start time: 8/21/2019 7:39 AM  End time: 8/21/2019 7:42 AM  Performed by: Tamar Boles MD  Authorized by: Tamar Boles MD       Pre-procedure:    Indications: at surgeon's request and post-op pain management    Preanesthetic Checklist: patient identified, risks and benefits discussed, site marked, timeout performed, anesthesia consent given and patient being monitored      Block Type:   Block Type:  Supraclavicular  Laterality:  Left  Monitoring:  Standard ASA monitoring, frequent vital sign checks, responsive to questions and oxygen  Injection Technique:  Single shot  Procedures: ultrasound guided    Prep: chlorhexidine    Location:  Supraclavicular  Needle Type:  Stimuplex  Needle Gauge:  22 G  Needle Localization:  Ultrasound guidance    Assessment:  Number of attempts:  1  Injection Assessment:  Incremental injection every 5 mL, local visualized surrounding nerve on ultrasound, negative aspiration for blood, ultrasound image on chart, no intravascular symptoms and no paresthesia  Patient tolerance:  Patient tolerated the procedure well with no immediate complications

## 2019-08-21 NOTE — ANESTHESIA PREPROCEDURE EVALUATION
Relevant Problems   No relevant active problems       Anesthetic History   No history of anesthetic complications            Review of Systems / Medical History  Patient summary reviewed, nursing notes reviewed and pertinent labs reviewed    Pulmonary  Within defined limits                 Neuro/Psych   Within defined limits           Cardiovascular    Hypertension          Hyperlipidemia    Exercise tolerance: >4 METS     GI/Hepatic/Renal         Renal disease: ESRD       Endo/Other        Anemia     Other Findings   Comments: gout         Physical Exam    Airway  Mallampati: II  TM Distance: 4 - 6 cm  Neck ROM: normal range of motion   Mouth opening: Normal     Cardiovascular  Regular rate and rhythm,  S1 and S2 normal,  no murmur, click, rub, or gallop             Dental  No notable dental hx       Pulmonary  Breath sounds clear to auscultation               Abdominal  GI exam deferred       Other Findings            Anesthetic Plan    ASA: 3  Anesthesia type: regional - supraclavicular block            Anesthetic plan and risks discussed with: Patient

## 2019-08-21 NOTE — DISCHARGE INSTRUCTIONS
Patient Discharge Instructions    [facility] / [id number] : 1945    Admitted 2019 Discharged: 2019               What to do at Home  Recommended activity: Elevate arm   No needle sticks or BP checks in affected arm  Leave dressing for 72-96 hours. Information obtained by :  I understand that if any problems occur once I am at home I am to contact my physician. I understand and acknowledge receipt of the instructions indicated above. R.N.'s Signature                                                                  Date/Time                                                                                                                                              Patient or Representative Signature                                                          Date/Time      Gladis Martinez MD    DISCHARGE SUMMARY from Nurse    PATIENT INSTRUCTIONS:    After general anesthesia or intravenous sedation, for 24 hours or while taking prescription Narcotics:  · Limit your activities  · Do not drive and operate hazardous machinery  · Do not make important personal or business decisions  · Do  not drink alcoholic beverages  · If you have not urinated within 8 hours after discharge, please contact your surgeon on call. Report the following to your surgeon:  · Excessive pain, swelling, redness or odor of or around the surgical area  · Temperature over 100.5  · Nausea and vomiting lasting longer than 4 hours or if unable to take medications  · Any signs of decreased circulation or nerve impairment to extremity: change in color, persistent  numbness, tingling, coldness or increase pain  · Any questions    What to do at Home:  A common side effect of anesthesia following surgery is nausea and/or vomiting.  In order to decrease symptoms, it is wise to avoid foods that are high in fat, greasy foods, milk products, and spicy foods for the first 24 hours. Acceptable foods for the first 24 hours following surgery include but are not limited to:     soup   broth    toast    crackers    applesauce    bananas    mashed potatoes,   soft or scrambled eggs   oatmeal    jello    It is important to eat when taking your pain medication. This will help to prevent nausea. If possible, please try to time your meals with your medications. It is very important to stay hydrated following surgery. Sip fluids frequently while awake. Avoid acidic drinks such as citrus juices and soda for 24 hours. Carbonated beverages may cause bloating and gas. Acceptable fluids include:    - water (flavor packets may add variety)  - coffee or tea (in moderation)  - Gatorade  - Kavon-aid  - apple juice  - cranberry juice    You are encouraged to cough and deep breathe every hour when awake. This will help to prevent respiratory complications following anesthesia. You may want to hug a pillow when coughing and sneezing to add additional support to the surgical area and to decrease discomfort if you had abdominal or chest surgery. If you are discharged home with support stockings, you may remove them after 24 hours. Support stockings are used to help prevent blood clots in the legs following surgery. Please take time to review all of your Home Care Instructions and Medication Information sheets provided in your discharge packet. If you have any questions, please contact your surgeons office. Thank you. TO PREVENT AN INFECTION      1. [address] YOUR HANDS     To prevent infection, good handwashing is the most important thing you or your caregiver can do.  Wash your hands with soap and water or use the hand  we gave you before you touch any wounds. 2. SHOWER     Use the antibacterial soap we gave you when you take a shower.       Shower with this soap until your wounds are healed.  To reach all areas of your body, you may need someone to help you.  Dont forget to clean your belly button with every shower. 3.  USE CLEAN SHEETS     Use freshly cleaned sheets on your bed after surgery.  To keep the surgery site clean, do not allow pets to sleep with you while your wound is still healing. 4. STOP SMOKING     Stop smoking, or at least cut back on smoking     Smoking slows your healing. 5.  CONTROL YOUR BLOOD SUGAR     High blood sugars slow wound healing.  If you are diabetic, control your blood sugar levels before and after your surgery. Patient Education      Tramadol (Ultram, Ultram ER, [place], Theratramadol-60) - (By mouth)   Why this medicine is used:   Treats moderate to severe pain. This medicine is a narcotic pain reliever. Contact a nurse or doctor right away if you have:  · Extreme weakness, shallow breathing  · Seizures  · Seeing or hearing things that are not there  · Anxiety, restlessness, muscle spasms, fever, sweating, diarrhea  · Slow or fast heartbeat     Common side effects:  · Nausea, vomiting, constipation  · Headache, drowsiness  · Itching, feeling of warmth, redness of the face, neck, arms, and upper chest  © 2017 [facility] Information is for End User's use only and may not be sold, redistributed or otherwise used for commercial purposes. *  Please carry medication information at all times in case of emergency situations. These are general instructions for a healthy lifestyle:    No smoking/ No tobacco products/ Avoid exposure to second hand smoke  Surgeon General's Warning:  Quitting smoking now greatly reduces serious risk to your health.     Obesity, smoking, and sedentary lifestyle greatly increases your risk for illness    A healthy diet, regular physical exercise & weight monitoring are important for maintaining a healthy lifestyle    You may be retaining fluid if you have a history of heart failure or if you experience any of the following symptoms:  Weight gain of 3 pounds or more overnight or 5 pounds in a week, increased swelling in our hands or feet or shortness of breath while lying flat in bed. Please call your doctor as soon as you notice any of these symptoms; do not wait until your next office visit. The discharge information has been reviewed with the {PATIENT PARENT GUARDIAN:70477}. The {PATIENT PARENT GUARDIAN:57141} verbalized understanding. Discharge medications reviewed with the {Dishcar meds reviewed [facility]:22089} and appropriate educational materials and side effects teaching were provided.   ___________________________________________________________________________________________________________________________________

## 2019-08-21 NOTE — PROGRESS NOTES
Handoff Report from Operating Room to PACU    Report received from CRISTAL Hi RN and Asuncion Lackey CRNA regarding Radha Kevinril. .      Surgeon(s):  Shirley Rubio MD  And Procedure(s) (LRB):  LEFT ARM ARTERIO VENOUS FISTULA CREATION (Left)  confirmed   with allergies, drains and dressings discussed. Anesthesia type, drugs, patient history, complications, estimated blood loss, vital signs, intake and output, and last pain medication, lines, reversal medications and temperature were reviewed.

## 2019-08-21 NOTE — PROGRESS NOTES
Spoke with Dr. Zakiya Rollins via phone. Dr. Zakiya Rollins states patient can go to phase ii for discharge and will complete his sign out soon.

## 2019-09-25 PROBLEM — Z12.5 PROSTATE CANCER SCREENING: Status: RESOLVED | Noted: 2017-01-16 | Resolved: 2019-09-25

## 2019-10-15 ENCOUNTER — CLINICAL SUPPORT (OUTPATIENT)
Dept: FAMILY MEDICINE CLINIC | Age: 74
End: 2019-10-15

## 2019-10-15 VITALS
RESPIRATION RATE: 18 BRPM | WEIGHT: 199.3 LBS | OXYGEN SATURATION: 97 % | BODY MASS INDEX: 28.53 KG/M2 | DIASTOLIC BLOOD PRESSURE: 66 MMHG | TEMPERATURE: 98.1 F | SYSTOLIC BLOOD PRESSURE: 139 MMHG | HEIGHT: 70 IN | HEART RATE: 64 BPM

## 2019-10-15 DIAGNOSIS — Z23 ENCOUNTER FOR IMMUNIZATION: Primary | ICD-10-CM

## 2019-10-15 RX ORDER — TRIAMCINOLONE ACETONIDE 1 MG/G
CREAM TOPICAL
Refills: 1 | COMMUNITY
Start: 2019-09-11 | End: 2021-05-19

## 2019-10-15 RX ORDER — SODIUM BICARBONATE 650 MG/1
TABLET ORAL
Refills: 5 | COMMUNITY
Start: 2019-09-30 | End: 2021-05-19

## 2019-10-15 NOTE — PROGRESS NOTES
Oumar 3055. Identified pt with two pt identifiers(name and ). Chief Complaint   Patient presents with    Immunization/Injection     Flu shot/ Room 7       1. Have you been to the ER, urgent care clinic since your last visit? Hospitalized since your last visit? n     2. Have you seen or consulted any other health care providers outside of the [address] [place] [address] since your last visit? Include any pap smears or colon screening. n      Advance Care Planning    In the event something were to happen to you and you were unable to speak on your behalf, do you have an Advance Directive/ Living Will in place stating your wishes? NO    If yes, do we have a copy on file NO    If no, would you like information NO    Medication reconciliation up to date and corrected with patient at this time. Today's provider has been notified of reason for visit, vitals and flowsheets obtained on patients. Reviewed record in preparation for visit, huddled with provider and have obtained necessary documentation.       Health Maintenance Due   Topic    DTaP/Tdap/Td series (1 - Tdap)    GLAUCOMA SCREENING Q2Y     Influenza Age 5 to Adult        Wt Readings from Last 3 Encounters:   10/15/19 199 lb 4.8 oz (90.4 kg)   19 196 lb 3.4 oz (89 kg)   08/15/19 199 lb 4.7 oz (90.4 kg)     Temp Readings from Last 3 Encounters:   10/15/19 98.1 °F (36.7 °C) (Oral)   19 97.8 °F (36.6 °C)   08/15/19 97.8 °F (36.6 °C)     BP Readings from Last 3 Encounters:   10/15/19 139/66   19 129/63   08/15/19 147/74     Pulse Readings from Last 3 Encounters:   10/15/19 64   19 69   08/15/19 (!) 59     Vitals:    10/15/19 1517   BP: 139/66   Pulse: 64   Resp: 18   Temp: 98.1 °F (36.7 °C)   TempSrc: Oral   SpO2: 97%   Weight: 199 lb 4.8 oz (90.4 kg)   Height: 5' 10\" (1.778 m)   PainSc:   0 - No pain         Learning Assessment:  :     Learning Assessment 2014   PRIMARY LEARNER Patient   PRIMARY LANGUAGE ENGLISH LEARNER PREFERENCE PRIMARY VIDEOS     PICTURES     DEMONSTRATION   ANSWERED BY patient   RELATIONSHIP SELF       Depression Screening:  :     3 most recent PHQ Screens 10/15/2019   Little interest or pleasure in doing things Not at all   Feeling down, depressed, irritable, or hopeless Not at all   Total Score PHQ 2 0       No flowsheet data found. Fall Risk Assessment:  :     Fall Risk Assessment, last 12 mths 10/15/2019   Able to walk? Yes   Fall in past 12 months? No       Abuse Screening:  :     Abuse Screening Questionnaire 5/22/2019 1/17/2018   Do you ever feel afraid of your partner? N N   Are you in a relationship with someone who physically or mentally threatens you? N N   Is it safe for you to go home? Y Y       ADL Screening:  :     ADL Assessment 5/22/2019   Feeding yourself No Help Needed   Getting from bed to chair No Help Needed   Getting dressed No Help Needed   Bathing or showering No Help Needed   Walk across the room (includes cane/walker) No Help Needed   Using the telphone No Help Needed   Taking your medications No Help Needed   Preparing meals No Help Needed   Managing money (expenses/bills) No Help Needed   Moderately strenuous housework (laundry) No Help Needed   Shopping for personal items (toiletries/medicines) No Help Needed   Shopping for groceries No Help Needed   Driving No Help Needed   Climbing a flight of stairs No Help Needed   Getting to places beyond walking distances No Help Needed           BMI:  Weight Metrics 10/15/2019 8/21/2019 8/15/2019 6/19/2019 6/3/2019 5/22/2019 1/17/2018   Weight 199 lb 4.8 oz 196 lb 3.4 oz 199 lb 4.7 oz 201 lb 1.6 oz 201 lb 4.8 oz 197 lb 11.2 oz 199 lb 1.6 oz   BMI 28.6 kg/m2 28.15 kg/m2 28.6 kg/m2 30.13 kg/m2 30.16 kg/m2 29.62 kg/m2 29.4 kg/m2           Medication reconciliation up to date and corrected with patient at this time.

## 2019-10-15 NOTE — PATIENT INSTRUCTIONS
Vaccine Information Statement    Influenza (Flu) Vaccine (Inactivated or Recombinant): What You Need to Know    Many Vaccine Information Statements are available in Croatian and other languages. See www.immunize.org/vis  Hojas de información sobre vacunas están disponibles en español y en muchos otros idiomas. Visite www.immunize.org/vis    1. Why get vaccinated? Influenza vaccine can prevent influenza (flu). Flu is a contagious disease that spreads around the [facility] every year, usually between October and May. Anyone can get the flu, but it is more dangerous for some people. Infants and young children, people 72years of age and older, pregnant women, and people with certain health conditions or a weakened immune system are at greatest risk of flu complications. Pneumonia, bronchitis, sinus infections and ear infections are examples of flu-related complications. If you have a medical condition, such as heart disease, cancer or diabetes, flu can make it worse. Flu can cause fever and chills, sore throat, muscle aches, fatigue, cough, headache, and runny or stuffy nose. Some people may have vomiting and diarrhea, though this is more common in children than adults. Each year thousands of people in the [facility] die from flu, and many more are hospitalized. Flu vaccine prevents millions of illnesses and flu-related visits to the doctor each year. 2. Influenza vaccines     CDC recommends everyone 10months of age and older get vaccinated every flu season. Children 6 months through 6years of age may need 2 doses during a single flu season. Everyone else needs only 1 dose each flu season. It takes about 2 weeks for protection to develop after vaccination. There are many flu viruses, and they are always changing. Each year a new flu vaccine is made to protect against three or four viruses that are likely to cause disease in the upcoming flu season.  Even when the vaccine doesnt exactly match these viruses, it may still provide some protection. Influenza vaccine does not cause flu. Influenza vaccine may be given at the same time as other vaccines. 3. Talk with your health care provider    Tell your vaccine provider if the person getting the vaccine:   Has had an allergic reaction after a previous dose of influenza vaccine, or has any severe, life-threatening allergies.  Has ever had Guillain-Barré Syndrome (also called GBS). In some cases, your health care provider may decide to postpone influenza vaccination to a future visit. People with minor illnesses, such as a cold, may be vaccinated. People who are moderately or severely ill should usually wait until they recover before getting influenza vaccine. Your health care provider can give you more information. 4. Risks of a reaction     Soreness, redness, and swelling where shot is given, fever, muscle aches, and headache can happen after influenza vaccine.  There may be a very small increased risk of Guillain-Barré Syndrome (GBS) after inactivated influenza vaccine (the flu shot). [facility] children who get the flu shot along with pneumococcal vaccine (PCV13), and/or DTaP vaccine at the same time might be slightly more likely to have a seizure caused by fever. Tell your health care provider if a child who is getting flu vaccine has ever had a seizure. People sometimes faint after medical procedures, including vaccination. Tell your provider if you feel dizzy or have vision changes or ringing in the ears. As with any medicine, there is a very remote chance of a vaccine causing a severe allergic reaction, other serious injury, or death. 5. What if there is a serious problem? An allergic reaction could occur after the vaccinated person leaves the clinic.  If you see signs of a severe allergic reaction (hives, swelling of the face and throat, difficulty breathing, a fast heartbeat, dizziness, or weakness), call 9-1-1 and get the person to the nearest hospital.    For other signs that concern you, call your health care provider. Adverse reactions should be reported to the Vaccine Adverse Event Reporting System (VAERS). Your health care provider will usually file this report, or you can do it yourself. Visit the VAERS website at www.vaers. [facility].gov or call 2-182.678.3683. VAERS is only for reporting reactions, and VAERS staff do not give medical advice. 6. The National Vaccine Injury Compensation Program    The [facility] Vaccine Injury Compensation Program (VICP) is a federal program that was created to compensate people who may have been injured by certain vaccines. Visit the VICP website at www.hrsa.gov/vaccinecompensation or call 0-886.809.1918 to learn about the program and about filing a claim. There is a time limit to file a claim for compensation. 7. How can I learn more?  Ask your health care provider.  Call your local or state health department.  Contact the Centers for Disease Control and Prevention (CDC):  - Call 1-893.557.8385 (9-296-JEG-INFO) or  - Visit CDCs influenza website at www.cdc.gov/flu    Vaccine Information Statement (Interim)  Inactivated Influenza Vaccine   8/15/2019  42 JUAN Johnson [id number]   Department of Health and Human Services  Centers for Disease Control and Prevention    Office Use Only

## 2020-08-30 ENCOUNTER — HOSPITAL ENCOUNTER (EMERGENCY)
Age: 75
Discharge: HOME | End: 2020-08-30
Attending: EMERGENCY MEDICINE
Payer: MEDICARE

## 2020-08-30 VITALS
RESPIRATION RATE: 18 BRPM | HEART RATE: 68 BPM | OXYGEN SATURATION: 97 % | BODY MASS INDEX: 25.92 KG/M2 | HEIGHT: 72 IN | SYSTOLIC BLOOD PRESSURE: 157 MMHG | TEMPERATURE: 98.3 F | DIASTOLIC BLOOD PRESSURE: 82 MMHG | WEIGHT: 191.36 LBS

## 2020-08-30 DIAGNOSIS — L98.9 SKIN LESION OF FACE: Primary | ICD-10-CM

## 2020-08-30 PROCEDURE — 99282 EMERGENCY DEPT VISIT SF MDM: CPT

## 2020-08-30 RX ORDER — DOXYCYCLINE 100 MG/1
100 CAPSULE ORAL 2 TIMES DAILY
Qty: 28 CAP | Refills: 0 | Status: SHIPPED | OUTPATIENT
Start: 2020-08-30 | End: 2020-09-13

## 2020-08-30 NOTE — ED PROVIDER NOTES
EMERGENCY DEPARTMENT HISTORY AND PHYSICAL EXAM      Date: 8/30/2020  Patient Name: Jose Pang. History of Presenting Illness     Chief Complaint   Patient presents with    Skin Problem     Patient states yesterday he began to notice R cheek skin appeared to have a bruise that is spreading on cheek. Pt states he thinks it is an insect bite. History Provided By: Patient    HPI: Jose Pang., 69 y.o. male with medical history significant for hypertension, hypercholesterolemia, kidney transplant, eczema who presents via private vehicle to the ED with cc of acute mild right cheek skin lesion X 2 days. Denies any pain or itching. Patient states that he thinks is a spider bite but he denies seeing a spider or feeling a bite. Was that just had a biopsy done on his left cheek by his dermatologist recently. Denies fever, chills, nausea, vomiting, headache, lightheadedness, dizziness, drainage, dental pain. No medications or modifying factors. PCP: Romel Cortez MD    There are no other complaints, changes, or physical findings at this time. No current facility-administered medications on file prior to encounter. Current Outpatient Medications on File Prior to Encounter   Medication Sig Dispense Refill    sodium bicarbonate 650 mg tablet TAKE 1 TABLET BY MOUTH 3 TIMES A DAY  5    triamcinolone acetonide (KENALOG) 0.1 % topical cream APPLY TO PSORIASIS TWICE DAILY/ AS NEEDED TO THE RASH  1    losartan (COZAAR) 50 mg tablet Take 1 Tab by mouth daily. 90 Tab 3    simvastatin (ZOCOR) 10 mg tablet TAKE 1 TABLET BY MOUTH EVERY DAY AT NIGHT 90 Tab 3    amLODIPine (NORVASC) 10 mg tablet TAKE 1 TABLET BY MOUTH EVERY DAY  5    carvedilol (COREG) 6.25 mg tablet TAKE 1 TABLET BY MOUTH TWICE A DAY WITH FOOD  5    EPOETIN KARIME (PROCRIT INJECTION) by Injection route.  KRILL OIL PO Take 1 Tab by mouth daily.  allopurinol (ZYLOPRIM) 100 mg tablet Take 100 mg by mouth nightly.  vitamin a-vitamin c-vit e-min (OCUVITE) tablet Take 1 Tab by mouth daily.  aspirin delayed-release 81 mg tablet Take 81 mg by mouth daily.  loratadine (CLARITIN) 10 mg tablet Take 10 mg by mouth daily.  vitamin E (AQUA GEMS) 400 unit capsule Take 400 Units by mouth two (2) times a day.        Past History     Past Medical History:  Past Medical History:   Diagnosis Date    Advance directive discussed with patient     Allergic rhinitis     Anemia NEC     r/t chronic renal insufficiency, followed by Dr. Eliza Licea [facility])     skin    Chronic kidney disease     Dr Susana Veloz stage 4    Contact dermatitis and other eczema, due to unspecified cause     rosacea, and [place]    Gingivitis     Gout     History of blood transfusion     History of chicken pox     Hypercholesterolemia     Hypertension     Monoclonal gammopathy of unknown significance (MGUS)     per [facility] note of 7/12/19    Renal cyst     woody  5/4/16    Screening for glaucoma 12/29/2016    [facility] lechuga note    Trauma      5 fx ribs and splenic injury 6/10     Past Surgical History:  Past Surgical History:   Procedure Laterality Date    CT HEART W/O CONT WITH CALCIUM  12/08    score of 55    EGD  11/18/09    dr Priscilla Schmidt grade 1 esophagitis    ENDOSCOPY, COLON, DIAGNOSTIC  6/16/2014 6/16/14 with dr. Heredia/11/09 dr Priscilla Schmidt diverticula and 10 year repeat    HX COLONOSCOPY      HX OTHER SURGICAL      basal cell CA removal    HX TONSILLECTOMY  1952    HX WISDOM TEETH EXTRACTION       Family History:  Family History   Problem Relation Age of Onset    Hypertension Mother     Lung Cancer Mother         smoker    Uterine Cancer Mother     Heart Disease Father     Lung Cancer Father         smoker    Arthritis Sister     Arthritis Brother     Cancer Brother         hairy cell leukemia    Diabetes Maternal Grandmother     Arthritis Sister      Social History:  Social History     Tobacco Use    Smoking status: Never Smoker    Smokeless tobacco: Never Used   Substance Use Topics    Alcohol use: No    Drug use: No     Allergies:  No Known Allergies  Review of Systems   Review of Systems   Constitutional: Negative. Negative for activity change, appetite change, chills, diaphoresis, fatigue and fever. HENT: Negative. Eyes: Negative. Negative for pain and visual disturbance. Respiratory: Negative. Negative for apnea, cough, chest tightness and shortness of breath. Cardiovascular: Negative. Negative for chest pain, palpitations and leg swelling. Gastrointestinal: Negative. Negative for abdominal pain, diarrhea, nausea and vomiting. Genitourinary: Negative. Musculoskeletal: Negative. Skin: Positive for rash. Negative for color change, pallor and wound. Neurological: Negative for dizziness, light-headedness and headaches. Psychiatric/Behavioral: Negative. Physical Exam   Physical Exam  Vitals signs and nursing note reviewed. Constitutional:       General: He is not in acute distress. Appearance: He is well-developed. He is not diaphoretic. HENT:      Head: Normocephalic and atraumatic. Jaw: There is normal jaw occlusion. No trismus. Right Ear: Hearing and external ear normal.      Left Ear: Hearing and external ear normal.      Nose: Nose normal.   Eyes:      Conjunctiva/sclera: Conjunctivae normal.      Pupils: Pupils are equal, round, and reactive to light. Neck:      Musculoskeletal: Normal range of motion. Pulmonary:      Effort: Pulmonary effort is normal. No accessory muscle usage or respiratory distress. Musculoskeletal: Normal range of motion. Skin:     General: Skin is warm and dry. Coloration: Skin is not pale. Findings: Rash present. Rash is macular. Neurological:      Mental Status: He is alert and oriented to person, place, and time. Psychiatric:         Speech: Speech normal.         Behavior: Behavior normal.         Thought Content:  Thought content normal.         Judgment: Judgment normal.       Diagnostic Study Results   Labs -   No results found for this or any previous visit (from the past 12 hour(s)). Radiologic Studies -   No orders to display     No results found. Medical Decision Making   I am the first provider for this patient. I reviewed the vital signs, available nursing notes, past medical history, past surgical history, family history and social history. Vital Signs-Reviewed the patient's vital signs. Patient Vitals for the past 24 hrs:   Temp Pulse Resp BP SpO2   08/30/20 1548 98.3 °F (36.8 °C) 68 18 157/82 97 %     Pulse Oximetry Analysis - 97% on RA and normal    Records Reviewed: Nursing Notes, Old Medical Records, Previous Radiology Studies and Previous Laboratory Studies    Provider Notes (Medical Decision Making):   23-year-old male presents with skin lesion to right cheek. Differential includes malignant lesion, benign skin lesion, abscess, folliculitis, insect bite, cellulitis. Plan to treat with antibiotics to cover for bacterial infection have follow-up with dermatology for skin biopsy. ED Course:   Initial assessment performed. The patients presenting problems have been discussed, and they are in agreement with the care plan formulated and outlined with them. I have encouraged them to ask questions as they arise throughout their visit. Progress Note:   Updated pt on all returned results and findings. Discussed the importance of proper follow up as referred below along with return precautions. Pt in agreement with the care plan and expresses agreement with and understanding of all items discussed. Disposition:  1628  I have discussed with patient their diagnosis, treatment, and follow up plan. The patient agrees to follow up as outlined in discharge paperwork and also to return to the ED with any worsening. Daryl Johnson PA-C      PLAN:  1.    Discharge Medication List as of 8/30/2020  4:13 PM START taking these medications    Details   doxycycline (MONODOX) 100 mg capsule Take 1 Cap by mouth two (2) times a day for 14 days. , Normal, Disp-28 Cap,R-0         CONTINUE these medications which have NOT CHANGED    Details   sodium bicarbonate 650 mg tablet TAKE 1 TABLET BY MOUTH 3 TIMES A DAY, Historical Med, R-5      triamcinolone acetonide (KENALOG) 0.1 % topical cream APPLY TO PSORIASIS TWICE DAILY/ AS NEEDED TO THE RASH, Historical Med, R-1      losartan (COZAAR) 50 mg tablet Take 1 Tab by mouth daily. , Normal, Disp-90 Tab, R-3      simvastatin (ZOCOR) 10 mg tablet TAKE 1 TABLET BY MOUTH EVERY DAY AT NIGHT, Normal, Disp-90 Tab, R-3      amLODIPine (NORVASC) 10 mg tablet TAKE 1 TABLET BY MOUTH EVERY DAY, Historical Med, R-5      carvedilol (COREG) 6.25 mg tablet TAKE 1 TABLET BY MOUTH TWICE A DAY WITH FOOD, Historical Med, R-5      EPOETIN KARIME (PROCRIT INJECTION) by Injection route., Historical Med      KRILL OIL PO Take 1 Tab by mouth daily. , Historical Med      allopurinol (ZYLOPRIM) 100 mg tablet Take 100 mg by mouth nightly., Historical Med      vitamin a-vitamin c-vit e-min (OCUVITE) tablet Take 1 Tab by mouth daily. , Historical Med      aspirin delayed-release 81 mg tablet Take 81 mg by mouth daily. , Historical Med      loratadine (CLARITIN) 10 mg tablet Take 10 mg by mouth daily. , Historical Med      vitamin E (AQUA GEMS) 400 unit capsule Take 400 Units by mouth two (2) times a day., Historical Med           2.    Follow-up Information     Follow up With Specialties Details Why Contact Info    ReadAnyi MD Family Medicine Schedule an appointment as soon as possible for a visit in 2 days As needed [address]  [address] [facility]  [phone]      Dermatology [address] [place] [address]  Schedule an appointment as soon as possible for a visit in 1 day As needed [place] [phone]        Return to ED if worse     Diagnosis Clinical Impression:   1. Skin lesion of face            Please note that this dictation was completed with Dragon, computer voice recognition software. Quite often unanticipated grammatical, syntax, homophones, and other interpretive errors are inadvertently transcribed by the computer software. Please disregard these errors. Additionally, please excuse any errors that have escaped final proofreading.

## 2020-08-30 NOTE — DISCHARGE INSTRUCTIONS
Patient Education        Skin Lesions: Care Instructions  Your Care Instructions  A skin lesion is a general term used for the different types of bumps, spots, moles or other growths that may appear on your skin. Most skin lesions are harmless, but sometimes they can be a sign of skin cancer or other health problems. Depending on what type of lesion you have, your doctor may cut out all or a small area of the skin tissue and send it to a lab to be looked at under a microscope. This is called a biopsy. A biopsy may be done to figure out what the lesion is or to make sure it is not skin cancer. Follow-up care is a key part of your treatment and safety. Be sure to make and go to all appointments, and call your doctor if you are having problems. It's also a good idea to know your test results and keep a list of the medicines you take. How can you care for yourself at home? · If your doctor told you how to care for your wound, follow your doctor's instructions. If you did not get instructions, follow this general advice:  ? Keep the wound bandaged and dry for the first day. ? After the first day, wash around the wound with clean water 2 times a day. Don't use hydrogen peroxide or alcohol, which can slow healing. ? You may cover the wound with a thin layer of petroleum jelly, such as Vaseline, and a nonstick bandage. ? Apply more petroleum jelly and replace the bandage as needed. · If you have stitches, you may get other instructions. You will have to return to have the stitches removed. · If a scab forms, do not pull it off. Let it fall off on its own. Wounds heal faster if no scab forms. Washing the area every day and using petroleum jelly will help keep a scab from forming. · If the wound bleeds, put direct pressure on it with a clean cloth until the bleeding stops. · Take an over-the-counter pain medicine, such as acetaminophen (Tylenol), ibuprofen (Advil, Motrin), or naproxen (Aleve).  Read and follow all instructions on the label. · Do not take two or more pain medicines at the same time unless the doctor told you to. Many pain medicines have acetaminophen, which is Tylenol. Too much acetaminophen (Tylenol) can be harmful. · If you had a growth \"frozen\" off with liquid nitrogen, you may get a blister. Do not break it. Let it dry up on its own. It is common for the blister to fill with blood. You do not need to do anything about this, but if it becomes too painful, call your doctor. When should you call for help? Call your doctor now or seek immediate medical care if:  · You have signs of infection, such as:  ? Increased pain, swelling, warmth, or redness. ? Red streaks leading from the wound. ? Pus draining from the wound. ? A fever. Watch closely for changes in your health, and be sure to contact your doctor if:  · The wound changes, bleeds, or gets worse. · You do not get better after 2 weeks of home care. Where can you learn more? Go to http://zeny-semaj.info/  Enter D776 in the search box to learn more about \"Skin Lesions: Care Instructions. \"  Current as of: October 31, 2019               Content Version: 12.5  © 0847-1828 Healthwise[employer]. Care instructions adapted under license by [employer] (which disclaims liability or warranty for this information). If you have questions about a medical condition or this instruction, always ask your healthcare professional. [place] [address] any warranty or liability for your use of this information.

## 2020-10-01 ENCOUNTER — OFFICE VISIT (OUTPATIENT)
Dept: FAMILY MEDICINE CLINIC | Age: 75
End: 2020-10-01
Payer: MEDICARE

## 2020-10-01 VITALS
HEART RATE: 66 BPM | WEIGHT: 192.8 LBS | DIASTOLIC BLOOD PRESSURE: 74 MMHG | TEMPERATURE: 97.9 F | OXYGEN SATURATION: 98 % | HEIGHT: 72 IN | SYSTOLIC BLOOD PRESSURE: 167 MMHG | RESPIRATION RATE: 18 BRPM | BODY MASS INDEX: 26.11 KG/M2

## 2020-10-01 DIAGNOSIS — T14.8XXA BRUISING: Primary | ICD-10-CM

## 2020-10-01 LAB
INR PPP: 1 (ref 0.9–1.1)
PROTHROMBIN TIME: 10.3 SEC (ref 9–11.1)

## 2020-10-01 PROCEDURE — G8427 DOCREV CUR MEDS BY ELIG CLIN: HCPCS | Performed by: NURSE PRACTITIONER

## 2020-10-01 PROCEDURE — G8754 DIAS BP LESS 90: HCPCS | Performed by: NURSE PRACTITIONER

## 2020-10-01 PROCEDURE — G8753 SYS BP > OR = 140: HCPCS | Performed by: NURSE PRACTITIONER

## 2020-10-01 PROCEDURE — 99213 OFFICE O/P EST LOW 20 MIN: CPT | Performed by: NURSE PRACTITIONER

## 2020-10-01 PROCEDURE — G8432 DEP SCR NOT DOC, RNG: HCPCS | Performed by: NURSE PRACTITIONER

## 2020-10-01 PROCEDURE — G8419 CALC BMI OUT NRM PARAM NOF/U: HCPCS | Performed by: NURSE PRACTITIONER

## 2020-10-01 PROCEDURE — 3017F COLORECTAL CA SCREEN DOC REV: CPT | Performed by: NURSE PRACTITIONER

## 2020-10-01 PROCEDURE — 1101F PT FALLS ASSESS-DOCD LE1/YR: CPT | Performed by: NURSE PRACTITIONER

## 2020-10-01 PROCEDURE — G8536 NO DOC ELDER MAL SCRN: HCPCS | Performed by: NURSE PRACTITIONER

## 2020-10-01 NOTE — PROGRESS NOTES
Andrew Nobles. is a 77 y.o. male    HIPAA verified by two patient identifiers. Chief Complaint   Patient presents with    Blood Clot     Pt states that he had a cancer knot taken out the side of his face about a month ago and had a hard time stopping the bleeding. Pt states that it is gone and healed. 1. Have you been to the ER, urgent care clinic since your last visit? Hospitalized since your last visit? No    2. Have you seen or consulted any other health care providers outside of the [address] [place] [address] since your last visit? Include any pap smears or colon screening.  No    Visit Vitals  BP (!) 181/89 (BP 1 Location: Left arm, BP Patient Position: Sitting)   Pulse 66   Temp 97.9 °F (36.6 °C) (Temporal)   Resp 18   Ht 6' (1.829 m)   Wt 192 lb 12.8 oz (87.5 kg)   SpO2 98%   BMI 26.15 kg/m²       Pain Scale: 0 - No pain/10  Pain Location:

## 2020-10-01 NOTE — PATIENT INSTRUCTIONS
1) Surgical site is well healed with no signs of bruising. Will check INR/PT to make sure blood clotting is normal.  As discussed, aspirin causes blood thinning and can be a factor in easily bruising. INR -  The international normalized ratio (INR) is used to determine blood clotting time. It is calculated from a Prothrombin time (PT), which evaluates a liver enzyme used in blood clotting. 2) Blood pressure is elevated today (167/74) but your home readings look good Please check your blood pressure through the week at staggered times in the day. (If you check in the morning, it should be at least one hour after your morning blood pressure medications.) Arm monitors are most accurate. If you use a wrist monitor, make sure your wrist is at heart level. You can bring your home monitor to your next visit and have it calibrated with the machine in the office to gauge your readings. Sit  with your feet uncrossed and relax for 5 minutes before taking your BP. Keep a written record of your blood pressure readings and bring it to each appointment. If your systolic (top) blood pressure is consistently greater than 140mmHg or less than 175FWMH, OR the diastolic (bottom) number is consistently greater than 90mmHg or less than 60mmHg, then please schedule an office appointment. Work on healthy eating - no salt diet, more potassium (helps flush out sodium,making healthier heart and arteries) - and incorporating daily exercise into your routine. Losing weight can help reduce your blood pressure! Cardiac symptoms that would need immediate attention include: uncomfortable pressure, squeezing, fullness or pain in the center of your chest. Pain or discomfort in one or both arms, the back, neck, jaw or stomach. Shortness of breath with or without chest discomfort, breaking out in a cold sweat, nausea or lightheadedness. Learning About High Blood Pressure What is high blood pressure? Blood pressure is a measure of how hard the blood pushes against the walls of your arteries. It's normal for blood pressure to go up and down throughout the day. But if it stays up, you have high blood pressure. Another name for high blood pressure is hypertension. Two numbers tell you your blood pressure. The first number is the systolic pressure (top number). It shows how hard the blood pushes when your heart is pumping. The second number is the diastolic pressure (bottom number). It shows how hard the blood pushes between heartbeats, when your heart is relaxed and filling with blood. Your doctor will give you a goal for your blood pressure based on your health and your age. High blood pressure (hypertension) means that the top number stays high, or the bottom number stays high, or both. High blood pressure increases the risk of stroke, heart attack, and other problems. What happens when you have high blood pressure? · Blood flows through your arteries with too much force. Over time, this can damage the heart and the walls of your arteries. But you can't feel it. High blood pressure usually doesn't cause symptoms. · High blood pressure makes your heart work harder. And that can lead to heart failure, which means your heart doesn't pump as much blood as your body needs. · Fat and calcium start to build up in your arteries. This buildup is called hardening of the arteries. It can cause many problems including a heart attack and stroke. · Arteries also carry blood and oxygen to organs like your eyes, kidneys, and brain. If high blood pressure damages those arteries, it can lead to vision loss, kidney disease, stroke, and a higher risk of dementia. How can you prevent high blood pressure? · Stay at a healthy weight. · Try to limit how much sodium you eat to less than 2,300 milligrams (mg) a day. If you limit your sodium to 1,500 mg a day, you can lower your blood pressure even more. ? Buy foods that are labeled \"unsalted,\" \"sodium-free,\" or \"low-sodium. \" Foods labeled \"reduced-sodium\" and \"light sodium\" may still have too much sodium. ? Flavor your food with garlic, lemon juice, onion, vinegar, herbs, and spices instead of salt. Do not use soy sauce, steak sauce, onion salt, garlic salt, mustard, or ketchup on your food. ? Use less salt (or none) when recipes call for it. You can often use half the salt a recipe calls for without losing flavor. · Be physically active. Get at least 30 minutes of exercise on most days of the week. Walking is a good choice. You also may want to do other activities, such as running, swimming, cycling, or playing tennis or team sports. · Limit alcohol to 2 drinks a day for men and 1 drink a day for women. · Eat plenty of fruits, vegetables, and low-fat dairy products. Eat less saturated and total fats. How is high blood pressure treated? · Your doctor will suggest making lifestyle changes to help your heart. For example, your doctor may ask you to eat healthy foods, quit smoking, lose extra weight, and be more active. · If lifestyle changes don't help enough, your doctor may recommend that you take medicine. · When blood pressure is very high, medicines are needed to lower it. Follow-up care is a key part of your treatment and safety. Be sure to make and go to all appointments, and call your doctor if you are having problems. It's also a good idea to know your test results and keep a list of the medicines you take. Where can you learn more? Go to http://www.sheikh.com/ Enter P501 in the search box to learn more about \"Learning About High Blood Pressure. \" Current as of: December 16, 2019               Content Version: 12.6 © 0604-9936 [employer]. Care instructions adapted under license by [employer] (which disclaims liability or warranty for this information).  If you have questions about a medical condition or this instruction, always ask your healthcare professional. [place] [address] any warranty or liability for your use of this information.

## 2020-10-01 NOTE — PROGRESS NOTES
S: Lakisha Grant is a 77 y.o. male who presents for blood clotting concerns    Assessment/Plan:     1. Bruising  -8/2020 Mohs surgery on left cheek and was told he had clotting issue  -Bilat hands - purpura patches noted  -kidney transplant 8/2019  - PROTHROMBIN TIME + INR    2. Essential Hypertension  -current therapy: carvedilol 6.25mg + amlodipine 10mg   -BP today: 167/74 , home BP past 2 weeks - 145-132/70-80's  -advised to monitor BP at home and keep log; goal<140/90 make OV if consistently above goal or <110/60          HPI:  Had Mohs surgery on left cheek 8/2020  No blood clots visible   Worried about blood clots     Kidney transplant - Oct 2019 - sees [facility] transplant team monthly   Oct 27th has in person appt     HTN  Current therapy: carvedilol 6.25mg + amlodipine 10mg   Home BP past 2 weeks - 145-132/70-80's  BP today: 167/74      Social History:   Social: lives with wife    Social History     Tobacco Use   Smoking Status Never Smoker   Smokeless Tobacco Never Used     Social History     Substance and Sexual Activity   Alcohol Use No     Social History     Substance and Sexual Activity   Drug Use No        Review of Systems:  - Constitutional Symptoms: no fevers, chills  - Cardiovascular: no chest pain or palpitations  - Respiratory: no cough or shortness of breath  - Neurological: no numbness, tingling, or headaches  ROS is negative otherwise.     3 most recent PHQ Screens 10/1/2020   Little interest or pleasure in doing things Not at all   Feeling down, depressed, irritable, or hopeless Not at all   Total Score PHQ 2 0       I reviewed the following:  Past Medical History:   Diagnosis Date    Advance directive discussed with patient     Allergic rhinitis     Anemia NEC     r/t chronic renal insufficiency, followed by Dr. Nimesh Alberts [facility])     skin    Chronic kidney disease     Dr Wilson Darling stage 4    Contact dermatitis and other eczema, due to unspecified cause     rosacea, and [place]    Gingivitis     Gout     History of blood transfusion     History of chicken pox     Hypercholesterolemia     Hypertension     Monoclonal gammopathy of unknown significance (MGUS)     per [facility] note of 7/12/19    Renal cyst     woody  5/4/16    Screening for glaucoma 12/29/2016    [facility] note    Trauma      5 fx ribs and splenic injury 6/10       Current Outpatient Medications   Medication Sig Dispense Refill    sodium bicarbonate 650 mg tablet TAKE 1 TABLET BY MOUTH 3 TIMES A DAY  5    triamcinolone acetonide (KENALOG) 0.1 % topical cream APPLY TO PSORIASIS TWICE DAILY/ AS NEEDED TO THE RASH  1    losartan (COZAAR) 50 mg tablet Take 1 Tab by mouth daily. 90 Tab 3    simvastatin (ZOCOR) 10 mg tablet TAKE 1 TABLET BY MOUTH EVERY DAY AT NIGHT 90 Tab 3    amLODIPine (NORVASC) 10 mg tablet TAKE 1 TABLET BY MOUTH EVERY DAY  5    carvedilol (COREG) 6.25 mg tablet TAKE 1 TABLET BY MOUTH TWICE A DAY WITH FOOD  5    EPOETIN KARIME (PROCRIT INJECTION) by Injection route.  KRILL OIL PO Take 1 Tab by mouth daily.  allopurinol (ZYLOPRIM) 100 mg tablet Take 100 mg by mouth nightly.  vitamin a-vitamin c-vit e-min (OCUVITE) tablet Take 1 Tab by mouth daily.  aspirin delayed-release 81 mg tablet Take 81 mg by mouth daily.  loratadine (CLARITIN) 10 mg tablet Take 10 mg by mouth daily.  vitamin E (AQUA GEMS) 400 unit capsule Take 400 Units by mouth two (2) times a day. No Known Allergies     O: VS:   Visit Vitals  BP (!) 181/89 (BP 1 Location: Left arm, BP Patient Position: Sitting)   Pulse 66   Temp 97.9 °F (36.6 °C) (Temporal)   Resp 18   Ht 6' (1.829 m)   Wt 192 lb 12.8 oz (87.5 kg)   SpO2 98%   BMI 26.15 kg/m²       Data Reviewed:    Component      Latest Ref Rng & Units 8/15/2019          11:52 AM   INR      0.9 - 1.1   1.0   Prothrombin time      9.0 - 11.1 sec 10.5       GENERAL: Opal Ghoshcooper Wyman is in no acute distress. Non-toxic. Well nourished. Well developed. Appropriately groomed. HEAD:  Normocephalic. Atraumatic. Non tender sinuses x 4. EYE: PERRLA. No drainage or discharge. NECK: supple. Midline trachea. No cervical adenopathy noted. RESP: Breath sounds are symmetrical bilaterally. Unlabored without SOB. Speaking in full sentences. Clear to auscultation bilaterally anteriorly and posteriorly. No wheezes. No rales or rhonchi. CV: normal rate. Regular rhythm. S1, S2 audible. No murmur noted. No rubs, clicks or gallops noted. HEME/LYMPH: peripheral pulses palpable 2+ x 4 extremities. No peripheral edema is noted. SKIN: Skin is warm and dry. Turgor is normal.  Left cheek: 4cm well healed surgical incision. No edema, erythema or ttp  Bilat hands - purpura patches noted  PSYCH: appropriate behavior, dress and thought processes. ___________________________________________________________________  Patient education was done. Advised on nutrition, physical activity, weight management, tobacco, alcohol and safety. Counseling included discussion of diagnosis, differentials, treatment options, prescribed treatment, warning signs and follow up. Medication risks/benefits, interactions and alternatives discussed with patient.      Patient verbalized understanding and agreed to plan of care. Patient was given an after visit summary which included current diagnoses, medications and vital signs. Follow up as directed.

## 2021-05-19 ENCOUNTER — OFFICE VISIT (OUTPATIENT)
Dept: INTERNAL MEDICINE CLINIC | Age: 76
End: 2021-05-19
Payer: MEDICARE

## 2021-05-19 VITALS
HEART RATE: 60 BPM | OXYGEN SATURATION: 98 % | TEMPERATURE: 98.5 F | HEIGHT: 72 IN | BODY MASS INDEX: 27.16 KG/M2 | SYSTOLIC BLOOD PRESSURE: 138 MMHG | DIASTOLIC BLOOD PRESSURE: 60 MMHG | RESPIRATION RATE: 12 BRPM | WEIGHT: 200.5 LBS

## 2021-05-19 DIAGNOSIS — Z00.00 MEDICARE ANNUAL WELLNESS VISIT, SUBSEQUENT: Primary | ICD-10-CM

## 2021-05-19 DIAGNOSIS — Z94.0 RENAL TRANSPLANT RECIPIENT: ICD-10-CM

## 2021-05-19 DIAGNOSIS — E79.0 HYPERURICEMIA: ICD-10-CM

## 2021-05-19 DIAGNOSIS — E78.00 PURE HYPERCHOLESTEROLEMIA: ICD-10-CM

## 2021-05-19 DIAGNOSIS — Z13.31 SCREENING FOR DEPRESSION: ICD-10-CM

## 2021-05-19 DIAGNOSIS — I10 ESSENTIAL HYPERTENSION: ICD-10-CM

## 2021-05-19 DIAGNOSIS — Z94.0 HISTORY OF KIDNEY TRANSPLANT: ICD-10-CM

## 2021-05-19 DIAGNOSIS — I25.10 PLAQUE IN HEART ARTERY: ICD-10-CM

## 2021-05-19 DIAGNOSIS — D84.9 IMMUNOSUPPRESSION (HCC): ICD-10-CM

## 2021-05-19 DIAGNOSIS — E55.9 VITAMIN D DEFICIENCY: ICD-10-CM

## 2021-05-19 PROCEDURE — G8419 CALC BMI OUT NRM PARAM NOF/U: HCPCS | Performed by: PHYSICIAN ASSISTANT

## 2021-05-19 PROCEDURE — G8427 DOCREV CUR MEDS BY ELIG CLIN: HCPCS | Performed by: PHYSICIAN ASSISTANT

## 2021-05-19 PROCEDURE — G8536 NO DOC ELDER MAL SCRN: HCPCS | Performed by: PHYSICIAN ASSISTANT

## 2021-05-19 PROCEDURE — G0439 PPPS, SUBSEQ VISIT: HCPCS | Performed by: PHYSICIAN ASSISTANT

## 2021-05-19 PROCEDURE — 1101F PT FALLS ASSESS-DOCD LE1/YR: CPT | Performed by: PHYSICIAN ASSISTANT

## 2021-05-19 PROCEDURE — 3017F COLORECTAL CA SCREEN DOC REV: CPT | Performed by: PHYSICIAN ASSISTANT

## 2021-05-19 PROCEDURE — G9231 DOC ESRD DIA TRANS PREG: HCPCS | Performed by: PHYSICIAN ASSISTANT

## 2021-05-19 PROCEDURE — G8432 DEP SCR NOT DOC, RNG: HCPCS | Performed by: PHYSICIAN ASSISTANT

## 2021-05-19 PROCEDURE — 99214 OFFICE O/P EST MOD 30 MIN: CPT | Performed by: PHYSICIAN ASSISTANT

## 2021-05-19 RX ORDER — GLUCOSAMINE/CHONDR SU A SOD 167-133 MG
CAPSULE ORAL
COMMUNITY
Start: 2020-10-27 | End: 2022-05-04

## 2021-05-19 RX ORDER — PSEUDOEPH/DM/GUAIFEN/ACETAMIN 30-10-324
20 EXPECTORANT ORAL
COMMUNITY

## 2021-05-19 RX ORDER — AZATHIOPRINE 50 MG/1
TABLET ORAL
COMMUNITY
Start: 2020-05-29 | End: 2022-06-13 | Stop reason: SDUPTHER

## 2021-05-19 RX ORDER — TACROLIMUS 1 MG/1
CAPSULE ORAL
COMMUNITY
Start: 2021-04-21

## 2021-05-19 RX ORDER — LANOLIN ALCOHOL/MO/W.PET/CERES
0.4 CREAM (GRAM) TOPICAL
COMMUNITY

## 2021-05-19 RX ORDER — PREDNISONE 5 MG/1
TABLET ORAL
COMMUNITY
Start: 2021-04-21

## 2021-05-19 RX ORDER — FAMOTIDINE 20 MG/1
20 TABLET, FILM COATED ORAL
COMMUNITY

## 2021-05-19 RX ORDER — TAMSULOSIN HYDROCHLORIDE 0.4 MG/1
CAPSULE ORAL
COMMUNITY
Start: 2021-05-10

## 2021-05-19 RX ORDER — CHOLECALCIFEROL (VITAMIN D3) 125 MCG
CAPSULE ORAL
COMMUNITY

## 2021-05-19 NOTE — LETTER
6/14/2021 7:48 AM 
 
Mr. Jim Byrd. 
[address] [place] [id number] Results for orders placed or performed in visit on 05/19/21 LIPID PANEL Result Value Ref Range Cholesterol, total 167 100 - 199 mg/dL Triglyceride 173 (H) 0 - 149 mg/dL HDL Cholesterol 27 (L) >39 mg/dL VLDL, calculated 31 5 - 40 mg/dL LDL, calculated 109 (H) 0 - 99 mg/dL VITAMIN D, 25 HYDROXY Result Value Ref Range VITAMIN D, 25-HYDROXY 49.3 30.0 - 100.0 ng/mL URIC ACID Result Value Ref Range Uric acid 9.8 (H) 3.8 - 8.4 mg/dL RECOMMENDATIONS [place], 
  
Cancel the last note. The allopurinol and statins have a poor interaction with one of your immunomodulator medications and anti rejection drugs. We will continue to monitor and treat for any potential side effects of the high uric acid. 
  
Continue the krill oil and niacin for cholesterol.  
 
 
 
 
Sincerely, 
 
 
Elgin Morales PA-C

## 2021-05-19 NOTE — PROGRESS NOTES
Cathy Ruiz. is a 77y.o. year old male seen in clinic today for   Chief Complaint   Patient presents with   [facility] Annual Wellness Visit    Hypertension    Chronic Kidney Disease       he is here today to establish care. Hx of Renal transplant R side due to ESRD with chronic anemia. [facility]ed by [facility] team. Kidney function has been running good per [facility]. He states he was working to get a dialysis graft when the kidney transplant happened. He se'es [facility] every 3 months. Hypertension: Controlled with amlodipine, carvedilol, tamsulosin  Compliant with medications? yes  Compliant with diet? Chicken/turkey. Low salt, low sugar  Compliant with exercise? walking  Average blood pressure readings outside of office. Systolic 110. Monitors salt intake and BP daily    Denies any HA, dizziness, CP, SOB, edema, visual changes    He is no longer on Allopurinol or a cholesterol medication. He uses Krill Oil and Niacin. He had colonoscopies done in 2016 due to the anemia to r/o GI bleeds. Overall he feels in good health. He is currently helping to care for his wife who has had brain surgery. he specifically denies any CP, SOB, HA. Dizziness, fevers, chills, N/V/D, urinary symptoms or other bowel changes. Current Outpatient Medications on File Prior to Visit   Medication Sig Dispense Refill    tacrolimus (PROGRAF) 1 mg capsule TAKE TWO CAPSULES BY MOUTH EVERY MORNING AND TAKE ONE CAPSULE EVERY EVENING      azaTHIOprine (IMURAN) 50 mg tablet 50 mg = 1 tab each dose, PO, daily, icd:z94.0, # 30 tab, 11 Refills, Pharmacy: [facility]      predniSONE (DELTASONE) 5 mg tablet TAKE 1 TABLET BY MOUTH DAILY      famotidine (PEPCID) 20 mg tablet 20 mg.      cholecalciferol, vitamin D3, 50 mcg (2,000 unit) tab EVERY DAY AT BEDTIME      sennosides 17.2 mg tab 1 Tablet.       tamsulosin (FLOMAX) 0.4 mg capsule TAKE ONE CAPSULE BY MOUTH EVERY DAY      lutein 20 mg cap 20 mg.      folic acid 375 mcg tablet 0.4 mg.      nicotinic acid (NIACIN) 500 mg tablet 500 mg = 1 tab each dose, PO, daily, 0 Refills      amLODIPine (NORVASC) 10 mg tablet TAKE 1 TABLET BY MOUTH EVERY DAY  5    carvedilol (COREG) 6.25 mg tablet TAKE 1 TABLET BY MOUTH TWICE A DAY WITH FOOD  5    KRILL OIL PO Take 1 Tab by mouth daily.  vitamin a-vitamin c-vit e-min (OCUVITE) tablet Take 1 Tab by mouth daily.  aspirin delayed-release 81 mg tablet Take 81 mg by mouth daily.  loratadine (CLARITIN) 10 mg tablet Take 10 mg by mouth daily.  vitamin E (AQUA GEMS) 400 unit capsule Take 400 Units by mouth two (2) times a day.  sodium bicarbonate 650 mg tablet TAKE 1 TABLET BY MOUTH 3 TIMES A DAY  5    triamcinolone acetonide (KENALOG) 0.1 % topical cream APPLY TO PSORIASIS TWICE DAILY/ AS NEEDED TO THE RASH  1    losartan (COZAAR) 50 mg tablet Take 1 Tab by mouth daily. 90 Tab 3    simvastatin (ZOCOR) 10 mg tablet TAKE 1 TABLET BY MOUTH EVERY DAY AT NIGHT 90 Tab 3    EPOETIN KARIME (PROCRIT INJECTION) by Injection route.  allopurinol (ZYLOPRIM) 100 mg tablet Take 100 mg by mouth nightly. No current facility-administered medications on file prior to visit.          No Known Allergies  Past Medical History:   Diagnosis Date    Advance directive discussed with patient     Allergic rhinitis     Anemia NEC     r/t chronic renal insufficiency, followed by Dr. [facility])     skin    Chronic kidney disease     Dr Xiomara Roper stage 4    Contact dermatitis and other eczema, due to unspecified cause     rosacea, and AK    Gingivitis     Gout     History of blood transfusion     History of chicken pox     Hypercholesterolemia     Hypertension     Monoclonal gammopathy of unknown significance (MGUS)     per [facility] note of 7/12/19    Renal cyst     woody  5/4/16    Screening for glaucoma 12/29/2016    nury lechuga note    Trauma      5 fx ribs and splenic injury 6/10 Past Surgical History:   Procedure Laterality Date    CT HEART W/O CONT WITH CALCIUM  12/08    score of 46    EGD  11/18/09    dr Martin Kidney grade 1 esophagitis    ENDOSCOPY, COLON, DIAGNOSTIC  6/16/2014 6/16/14 with dr. Heredia/11/09 dr Martin Kidney diverticula and 10 year repeat    HX COLONOSCOPY      HX OTHER SURGICAL      basal cell CA removal    HX TONSILLECTOMY  1952    HX WISDOM TEETH EXTRACTION          Family History   Problem Relation Age of Onset    Hypertension Mother     Lung Cancer Mother         smoker    Uterine Cancer Mother     Heart Disease Father     Lung Cancer Father         smoker    Arthritis Sister     Arthritis Brother     Cancer Brother         hairy cell leukemia    Diabetes Maternal Grandmother     Arthritis Sister         Social History     Socioeconomic History    Marital status:      Spouse name: Not on file    Number of children: Not on file    Years of education: Not on file    Highest education level: Not on file   Occupational History    Not on file   Tobacco Use    Smoking status: Never Smoker    Smokeless tobacco: Never Used   Vaping Use    Vaping Use: Never used   Substance and Sexual Activity    Alcohol use: No    Drug use: No    Sexual activity: Yes     Partners: Female   Other Topics Concern    Not on file   Social History Narrative    Not on file     Social Determinants of Health     Financial Resource Strain:     Difficulty of Paying Living Expenses:    Food Insecurity:     Worried About Running Out of Food in the Last Year:     Ran Out of Food in the Last Year:    Transportation Needs:     Lack of Transportation (Medical):      Lack of Transportation (Non-Medical):    Physical Activity:     Days of Exercise per Week:     Minutes of Exercise per Session:    Stress:     Feeling of Stress :    Social Connections:     Frequency of Communication with Friends and Family:     Frequency of Social Gatherings with Friends and Family:     Attends Yarsanism Services:     Active Member of Clubs or Organizations:     Attends Club or Organization Meetings:     Marital Status:    Intimate Partner Violence:     Fear of Current or Ex-Partner:     Emotionally Abused:     Physically Abused:     Sexually Abused:            Visit Vitals  BP (!) 173/86 (BP 1 Location: Left upper arm, BP Patient Position: Sitting)   Pulse 60   Temp 98.5 °F (36.9 °C) (Oral)   Resp 12   Ht 6' (1.829 m)   Wt 200 lb 8 oz (90.9 kg)   SpO2 98%   BMI 27.19 kg/m²       Review of Systems   Constitutional: Negative for chills, fever, malaise/fatigue and weight loss. Respiratory: Negative for cough, shortness of breath and wheezing. Cardiovascular: Negative for chest pain, palpitations and leg swelling. Gastrointestinal: Negative for abdominal pain, blood in stool, constipation, diarrhea, heartburn, melena, nausea and vomiting. Genitourinary: Negative for dysuria and frequency. Musculoskeletal: Negative for myalgias. Skin: Negative for rash. Neurological: Negative for dizziness, weakness and headaches. Endo/Heme/Allergies: Does not bruise/bleed easily. Psychiatric/Behavioral: Negative for depression. All other systems reviewed and are negative. Physical Exam  Vitals and nursing note reviewed. Constitutional:       Appearance: Normal appearance. He is normal weight. HENT:      Head: Normocephalic and atraumatic. Right Ear: Tympanic membrane, ear canal and external ear normal.      Left Ear: Tympanic membrane, ear canal and external ear normal.      Nose: Nose normal.      Mouth/Throat:      Mouth: Mucous membranes are moist.      Pharynx: Oropharynx is clear. No oropharyngeal exudate or posterior oropharyngeal erythema. Eyes:      Conjunctiva/sclera: Conjunctivae normal.      Pupils: Pupils are equal, round, and reactive to light. Neck:      Vascular: No carotid bruit. Cardiovascular:      Rate and Rhythm: Normal rate and regular rhythm. Pulses: Normal pulses. Heart sounds: Normal heart sounds. No murmur heard. Pulmonary:      Effort: Pulmonary effort is normal.      Breath sounds: Normal breath sounds. No stridor. No wheezing, rhonchi or rales. Abdominal:      General: Abdomen is flat. Bowel sounds are normal. There is no distension. Tenderness: There is no abdominal tenderness. There is no guarding. Musculoskeletal:         General: Normal range of motion. Cervical back: Normal range of motion and neck supple. No rigidity. Skin:     General: Skin is dry. Capillary Refill: Capillary refill takes less than 2 seconds. Findings: Bruising present. Neurological:      General: No focal deficit present. Mental Status: He is alert and oriented to person, place, and time. Mental status is at baseline. Sensory: No sensory deficit. Motor: No weakness. Coordination: Coordination normal.      Gait: Gait normal.   Psychiatric:         Mood and Affect: Mood normal.          No results found for this or any previous visit (from the past 24 hour(s)). ASSESSMENT AND PLAN  Diagnoses and all orders for this visit:    1. Medicare annual wellness visit, subsequent    2. Essential hypertension  At goal on triple therapy. Monitored daily. Followed by [facility] transplant team. Keeping BP at goal is primary   3. 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   4. Pure hypercholesterolemia  -     LIPID PANEL; Future  Recheck Lipids at next blood draw   5. Hyperuricemia  -     URIC ACID; Future  Was previously on low dose allopurinol. Need to view records to see what  7. Vitamin D deficiency  -     VITAMIN D, 25 HYDROXY; Future  Continue low dose supplement  8. History of kidney transplant  Followed by [facility] health transplant team. Continue Prograf, Imuran and Prednisone   9.  Screening for depression  Negatiuve         I have discussed the diagnosis with the patient and the intended plan as seen in the above orders. Patient is in agreement. The patient has received an after-visit summary and questions were answered concerning future plans. I have discussed medication side effects and warnings with the patient as well. Guy Sterling PA-C    This is the Subsequent Medicare Annual Wellness Exam, performed 12 months or more after the Initial AWV or the last Subsequent AWV    I have reviewed the patient's medical history in detail and updated the computerized patient record. Assessment/Plan   Education and counseling provided:  Are appropriate based on today's review and evaluation  End-of-Life planning (with patient's consent)    1. Essential hypertension  2. 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 Pure hypercholesterolemia  4. Hyperuricemia  5. Proteinuria, unspecified type  6. Vitamin D deficiency  7. History of kidney transplant  8. Medicare annual wellness visit, subsequent  9. Screening for depression       Depression Risk Factor Screening     3 most recent PHQ Screens 5/19/2021   Little interest or pleasure in doing things Not at all   Feeling down, depressed, irritable, or hopeless Not at all   Total Score PHQ 2 0       Alcohol Risk Screen    Do you average more than 1 drink per night or more than 7 drinks a week: No    In the past three months have you have had more than 4 drinks containing alcohol on one occasion: No        Functional Ability and Level of Safety    Hearing: Hearing is good. Activities of Daily Living: The home contains: no safety equipment. Patient does total self care      Ambulation: with no difficulty     Fall Risk:  Fall Risk Assessment, last 12 mths 5/19/2021   Able to walk? Yes   Fall in past 12 months? 0   Do you feel unsteady?  0   Are you worried about falling 0      Abuse Screen:  Patient is not abused       Cognitive Screening    Has your family/caregiver stated any concerns about your memory: no     Cognitive Screening: Normal - Animal Naming Test    Health Maintenance Due     Health Maintenance Due   Topic Date Due    DTaP/Tdap/Td series (1 - Tdap) 09/11/1967    Lipid Screen  06/03/2020       Patient Care Team   Patient Care Team:  Delma Brown PA-C as PCP - General (Physician Assistant)  Nitesh Baxter MD as Consulting Provider (Nephrology)  Edith Rider MD (Hematology and Oncology)  Other, MD Alfredo (Optometry)    History     Patient Active Problem List   Diagnosis Code    Pure hypercholesterolemia E78.00    Essential hypertension I10    Hyperuricemia E79.0    Chronic kidney disease, stage III (moderate) ([facility] [address] N18.30    Proteinuria R80.9    Vitamin D deficiency E55.9    Anemia in chronic renal disease N18.9, D63.1    S/P biopsy Z98.890    Elevated blood pressure reading in office with diagnosis of hypertension I10    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)     skin    Chronic kidney disease     Dr Vernon Session stage 4    Contact dermatitis and other eczema, due to unspecified cause     rosacea, and AK    Gingivitis     Gout     History of blood transfusion     History of chicken pox     Hypercholesterolemia     Hypertension     Monoclonal gammopathy of unknown significance (MGUS)     per [facility] note of 7/12/19    Renal cyst     woody  5/4/16    Screening for glaucoma 12/29/2016    nury lechuga note    Trauma      5 fx ribs and splenic injury 6/10      Past Surgical History:   Procedure Laterality Date    CT HEART W/O CONT WITH CALCIUM  12/08    score of 46    EGD  11/18/09    dr Haven Machado grade 1 esophagitis    ENDOSCOPY, COLON, DIAGNOSTIC  6/16/2014 6/16/14 with dr. Heredia/11/09 dr Haven Machado diverticula and 10 year repeat    HX COLONOSCOPY      HX OTHER SURGICAL      basal cell CA removal    HX TONSILLECTOMY  1952    HX WISDOM TEETH EXTRACTION       Current Outpatient Medications   Medication Sig Dispense Refill    tacrolimus (PROGRAF) 1 mg capsule TAKE TWO CAPSULES BY MOUTH EVERY MORNING AND TAKE ONE CAPSULE EVERY EVENING      azaTHIOprine (IMURAN) 50 mg tablet 50 mg = 1 tab each dose, PO, daily, icd:z94.0, # 30 tab, 11 Refills, Pharmacy: [facility]      predniSONE (DELTASONE) 5 mg tablet TAKE 1 TABLET BY MOUTH DAILY      famotidine (PEPCID) 20 mg tablet 20 mg.      cholecalciferol, vitamin D3, 50 mcg (2,000 unit) tab EVERY DAY AT BEDTIME      sennosides 17.2 mg tab 1 Tablet.  tamsulosin (FLOMAX) 0.4 mg capsule TAKE ONE CAPSULE BY MOUTH EVERY DAY      lutein 20 mg cap 20 mg.      folic acid 310 mcg tablet 0.4 mg.      nicotinic acid (NIACIN) 500 mg tablet 500 mg = 1 tab each dose, PO, daily, 0 Refills      amLODIPine (NORVASC) 10 mg tablet TAKE 1 TABLET BY MOUTH EVERY DAY  5    carvedilol (COREG) 6.25 mg tablet TAKE 1 TABLET BY MOUTH TWICE A DAY WITH FOOD  5    KRILL OIL PO Take 1 Tab by mouth daily.  vitamin a-vitamin c-vit e-min (OCUVITE) tablet Take 1 Tab by mouth daily.  aspirin delayed-release 81 mg tablet Take 81 mg by mouth daily.  loratadine (CLARITIN) 10 mg tablet Take 10 mg by mouth daily.  vitamin E (AQUA GEMS) 400 unit capsule Take 400 Units by mouth two (2) times a day.        No Known Allergies    Family History   Problem Relation Age of Onset    Hypertension Mother     Lung Cancer Mother         smoker    Uterine Cancer Mother     Heart Disease Father     Lung Cancer Father         smoker    Arthritis Sister     Arthritis Brother     Cancer Brother         hairy cell leukemia    Diabetes Maternal Grandmother     Arthritis Sister      Social History     Tobacco Use    Smoking status: Never Smoker    Smokeless tobacco: Never Used   Substance Use Topics    Alcohol use: No         Benjamin Murrieta PA-C

## 2021-05-19 NOTE — PATIENT INSTRUCTIONS
Medicare Wellness Visit, Male The best way to live healthy is to have a lifestyle where you eat a well-balanced diet, exercise regularly, limit alcohol use, and quit all forms of tobacco/nicotine, if applicable. Regular preventive services are another way to keep healthy. Preventive services (vaccines, screening tests, monitoring & exams) can help personalize your care plan, which helps you manage your own care. Screening tests can find health problems at the earliest stages, when they are easiest to treat. [facility] follows the current, evidence-based guidelines published by the [facility] ([facility]) when recommending preventive services for our patients. Because we follow these guidelines, sometimes recommendations change over time as research supports it. (For example, a prostate screening blood test is no longer routinely recommended for men with no symptoms). Of course, you and your doctor may decide to screen more often for some diseases, based on your risk and co-morbidities (chronic disease you are already diagnosed with). Preventive services for you include: - Medicare offers their members a free annual wellness visit, which is time for you and your primary care provider to discuss and plan for your preventive service needs. Take advantage of this benefit every year! 
-All adults over age 72 should receive the recommended pneumonia vaccines. Current USPSTF guidelines recommend a series of two vaccines for the best pneumonia protection.  
-All adults should have a flu vaccine yearly and tetanus vaccine every 10 years. 
-All adults age 48 and older should receive the shingles vaccines (series of two vaccines).       
-All adults age 38-68 who are overweight should have a diabetes screening test once every three years.  
-Other screening tests & preventive services for persons with diabetes include: an eye exam to screen for diabetic retinopathy, a kidney function test, a foot exam, and stricter control over your cholesterol.  
-Cardiovascular screening for adults with routine risk involves an electrocardiogram (ECG) at intervals determined by the provider.  
-Colorectal cancer screening should be done for adults age 54-65 with no increased risk factors for colorectal cancer. There are a number of acceptable methods of screening for this type of cancer. Each test has its own benefits and drawbacks. Discuss with your provider what is most appropriate for you during your annual wellness visit. The different tests include: colonoscopy (considered the best screening method), a fecal occult blood test, a fecal DNA test, and sigmoidoscopy. 
-All adults born between [facility] should be screened once for Hepatitis C. 
-An Abdominal Aortic Aneurysm (AAA) Screening is recommended for men age 73-68 who has ever smoked in their lifetime. Here is a list of your current Health Maintenance items (your personalized list of preventive services) with a due date: 
Health Maintenance Due Topic Date Due  
 DTaP/Tdap/Td  (1 - Tdap) 09/11/1967  Cholesterol Test   06/03/2020

## 2021-06-09 DIAGNOSIS — E79.0 HYPERURICEMIA: Primary | ICD-10-CM

## 2021-06-09 LAB
25(OH)D3+25(OH)D2 SERPL-MCNC: 49.3 NG/ML (ref 30–100)
CHOLEST SERPL-MCNC: 167 MG/DL (ref 100–199)
HDLC SERPL-MCNC: 27 MG/DL
LDLC SERPL CALC-MCNC: 109 MG/DL (ref 0–99)
TRIGL SERPL-MCNC: 173 MG/DL (ref 0–149)
URATE SERPL-MCNC: 9.8 MG/DL (ref 3.8–8.4)
VLDLC SERPL CALC-MCNC: 31 MG/DL (ref 5–40)

## 2021-06-09 NOTE — PROGRESS NOTES
Darien,    Cancel the last note. The allopurinol and statins have a poor interaction with one of your immunomodulator medications and anti rejection drugs. We will continue to monitor and treat for any potential side effects of the high uric acid. Continue the krill oil and niacin for cholesterol.

## 2021-06-09 NOTE — PROGRESS NOTES
Darien,  Your labs are back for me. Your Uric Acid Level is fairly high and could lead you to have kidney stones and gout. I would like to restart you on a low dose Allopurinol. I want you to take a 1/2 tablet every day of a 100 mg tablet = 50 mg. This is a low dose to not affect the kidney. However, before you start I would like you to reach out to the transplant team and let them know. I have not received any records from them as of yet to look and see why they stopped your cholesterol medication as that is high as well. I feel you would benefit from being back on a low dose statin medication but I would like for you to reach out to your team and get approval.     I will have allopurinol called to pharmacy and will await your answer from them to see about the statin for your cholesterol .     Vitamin D levels were good

## 2021-11-04 NOTE — PROGRESS NOTES
Enid Gan. is a 77y.o. year old male seen in clinic today for   Chief Complaint   Patient presents with    Hypertension    Chronic Kidney Disease       he is here today to follow up for HTN, HLD, hyperuricemia,   Hx of renal transplant in setting of ESRD. Followed by [facility] transplant team. Chronically immunosuppressed with anti-rejection drugs . Was told by [facility] he can transfer all care of his kidney to Dr. Talisha Connors. His numbers have looked good so far. Hypertension: Controlled with Tamsulosin, amlodipine, carvedilol  Compliant with medications? always  Compliant with diet? Yes, low salt  Compliant with exercise? Very active around house, does have some occasional fatigue. Average blood pressure readings outside of office. Consistently between 450-724 systolic. Transplant team okay with these numbers. Denies any HA, dizziness, CP, SOB, edema, visual changes    No additional complaints. he specifically denies any CP, SOB, HA. Dizziness, fevers, chills, N/V/D, urinary symptoms or other bowel changes. Current Outpatient Medications on File Prior to Visit   Medication Sig Dispense Refill    tacrolimus (PROGRAF) 1 mg capsule TAKE TWO CAPSULES BY MOUTH EVERY MORNING AND TAKE ONE CAPSULE EVERY EVENING      azaTHIOprine (IMURAN) 50 mg tablet 50 mg = 1 tab each dose, PO, daily, icd:z94.0, # 30 tab, 11 Refills, Pharmacy: [facility]      predniSONE (DELTASONE) 5 mg tablet TAKE 1 TABLET BY MOUTH DAILY      famotidine (PEPCID) 20 mg tablet 20 mg.      cholecalciferol, vitamin D3, 50 mcg (2,000 unit) tab EVERY DAY AT BEDTIME      sennosides 17.2 mg tab 1 Tablet.       tamsulosin (FLOMAX) 0.4 mg capsule TAKE ONE CAPSULE BY MOUTH EVERY DAY      lutein 20 mg cap 20 mg.      folic acid 415 mcg tablet 0.4 mg.      nicotinic acid (NIACIN) 500 mg tablet 500 mg = 1 tab each dose, PO, daily, 0 Refills      amLODIPine (NORVASC) 10 mg tablet TAKE 1 TABLET BY MOUTH EVERY DAY  5    carvedilol (COREG) 6.25 mg tablet TAKE 1 TABLET BY MOUTH TWICE A DAY WITH FOOD  5    KRILL OIL PO Take 1 Tab by mouth daily.  vitamin a-vitamin c-vit e-min (OCUVITE) tablet Take 1 Tab by mouth daily.  aspirin delayed-release 81 mg tablet Take 81 mg by mouth daily.  loratadine (CLARITIN) 10 mg tablet Take 10 mg by mouth daily.  vitamin E (AQUA GEMS) 400 unit capsule Take 400 Units by mouth two (2) times a day. No current facility-administered medications on file prior to visit.          No Known Allergies  Past Medical History:   Diagnosis Date    Advance directive discussed with patient     Allergic rhinitis     Anemia NEC     r/t chronic renal insufficiency, followed by Dr. Blanca [facility])     skin    Chronic kidney disease     Dr Briseyda Mckenna stage 4    Contact dermatitis and other eczema, due to unspecified cause     rosacea, and [place]    Gingivitis     Gout     History of blood transfusion     History of chicken pox     Hypercholesterolemia     Hypertension     Monoclonal gammopathy of unknown significance (MGUS)     per [facility] note of 7/12/19    Renal cyst     woody  5/4/16    Screening for glaucoma 12/29/2016    [facility] lechuga note    Trauma      5 fx ribs and splenic injury 6/10      Past Surgical History:   Procedure Laterality Date    CT HEART W/O CONT WITH CALCIUM  12/08    score of 55    EGD  11/18/09    dr Deisy Bowie grade 1 esophagitis    ENDOSCOPY, COLON, DIAGNOSTIC  6/16/2014 6/16/14 with dr. Heredia/11/09 dr Deisy Bowie diverticula and 10 year repeat    HX COLONOSCOPY      HX OTHER SURGICAL      basal cell CA removal    HX TONSILLECTOMY  1952    HX WISDOM TEETH EXTRACTION          Family History   Problem Relation Age of Onset    Hypertension Mother     Lung Cancer Mother         smoker    Uterine Cancer Mother     Heart Disease Father     Lung Cancer Father         smoker    Arthritis Sister     Arthritis Brother     Cancer Brother         hairy cell leukemia    Diabetes Maternal Grandmother     Arthritis Sister         Social History     Socioeconomic History    Marital status:      Spouse name: Not on file    Number of children: Not on file    Years of education: Not on file    Highest education level: Not on file   Occupational History    Not on file   Tobacco Use    Smoking status: Never Smoker    Smokeless tobacco: Never Used   Vaping Use    Vaping Use: Never used   Substance and Sexual Activity    Alcohol use: No    Drug use: No    Sexual activity: Yes     Partners: Female   Other Topics Concern    Not on file   Social History Narrative    Not on file     Social Determinants of Health     Financial Resource Strain:     Difficulty of Paying Living Expenses: Not on file   Food Insecurity:     Worried About Running Out of Food in the Last Year: Not on file    Angelito of Food in the Last Year: Not on file   Transportation Needs:     Lack of Transportation (Medical): Not on file    Lack of Transportation (Non-Medical):  Not on file   Physical Activity:     Days of Exercise per Week: Not on file    Minutes of Exercise per Session: Not on file   Stress:     Feeling of Stress : Not on file   Social Connections:     Frequency of Communication with Friends and Family: Not on file    Frequency of Social Gatherings with Friends and Family: Not on file    Attends Druze Services: Not on file    Active Member of [address] [place] [address] [employer] or Organizations: Not on file    Attends Club or Organization Meetings: Not on file    Marital Status: Not on file   Intimate Partner Violence:     Fear of Current or Ex-Partner: Not on file    Emotionally Abused: Not on file    Physically Abused: Not on file    Sexually Abused: Not on file   Housing Stability:     Unable to Pay for Housing in the Last Year: Not on file    Number of [place] in the Last Year: Not on file    Unstable Housing in the Last Year: Not on file           Visit Vitals  /78 (BP 1 Location: Right arm, BP Patient Position: Sitting, BP Cuff Size: Adult)   Pulse 62   Temp 97.9 °F (36.6 °C) (Oral)   Resp 12   Ht 6' (1.829 m)   Wt 205 lb 8 oz (93.2 kg)   SpO2 98%   BMI 27.87 kg/m²       Review of Systems   Constitutional: Negative for chills, fever, malaise/fatigue and weight loss. Respiratory: Negative for cough, shortness of breath and wheezing. Cardiovascular: Negative for chest pain, palpitations and leg swelling. Gastrointestinal: Negative for abdominal pain, blood in stool, constipation, diarrhea, heartburn, melena, nausea and vomiting. Genitourinary: Negative for dysuria and frequency. Musculoskeletal: Negative for myalgias. Skin: Negative for rash. Neurological: Negative for dizziness, weakness and headaches. Endo/Heme/Allergies: Does not bruise/bleed easily. Psychiatric/Behavioral: Negative for depression. All other systems reviewed and are negative. Physical Exam  Vitals and nursing note reviewed. Constitutional:       Appearance: Normal appearance. HENT:      Head: Normocephalic and atraumatic. Right Ear: Tympanic membrane, ear canal and external ear normal.      Left Ear: Tympanic membrane, ear canal and external ear normal.      Nose: Nose normal.      Mouth/Throat:      Mouth: Mucous membranes are moist.      Pharynx: Oropharynx is clear. No oropharyngeal exudate or posterior oropharyngeal erythema. Eyes:      Conjunctiva/sclera: Conjunctivae normal.      Pupils: Pupils are equal, round, and reactive to light. Neck:      Vascular: No carotid bruit. Cardiovascular:      Rate and Rhythm: Normal rate and regular rhythm. Pulses: Normal pulses. Heart sounds: Normal heart sounds. No murmur heard. Pulmonary:      Effort: Pulmonary effort is normal.      Breath sounds: Normal breath sounds. No stridor. No wheezing, rhonchi or rales. Abdominal:      General: Abdomen is flat. Bowel sounds are normal. There is no distension. Tenderness: There is no abdominal tenderness. There is no guarding. Musculoskeletal:         General: Normal range of motion. Cervical back: Normal range of motion and neck supple. No rigidity. Skin:     General: Skin is dry. Capillary Refill: Capillary refill takes less than 2 seconds. Neurological:      General: No focal deficit present. Mental Status: He is oriented to person, place, and time. Mental status is at baseline. Psychiatric:         Mood and Affect: Mood normal.          No results found for this or any previous visit (from the past 24 hour(s)). ASSESSMENT AND PLAN  Diagnoses and all orders for this visit:    1. Elevated blood pressure reading in office with diagnosis of hypertension  BP at goal at home. Elevated on first read. Normal at home  2. Stage 3 chronic kidney disease, unspecified whether stage 3a or 3b CKD ([facility]  Monitored by Carrie Sandy MD  3. History of kidney transplant  Continue immunosuppression and anti-rejection treatment  4. Immunosuppression ([facility]  Continue treatment  5. Anemia in stage 4 chronic kidney disease (HCC)  Chronic anemia, stable. Fatigue throughout day  6. Hyperuricemia  Not able to take allopurinol with antirejection drugs  7. Pure hypercholesterolemia  No able to take statins with antirejection drugs. Continue krill oil   8. Vitamin D deficiency           I have discussed the diagnosis with the patient and the intended plan as seen in the above orders. Patient is in agreement. The patient has received an after-visit summary and questions were answered concerning future plans. I have discussed medication side effects and warnings with the patient as well.     Marleni Anand PA-C

## 2021-11-05 ENCOUNTER — OFFICE VISIT (OUTPATIENT)
Dept: INTERNAL MEDICINE CLINIC | Age: 76
End: 2021-11-05
Payer: MEDICARE

## 2021-11-05 VITALS
TEMPERATURE: 97.9 F | RESPIRATION RATE: 12 BRPM | DIASTOLIC BLOOD PRESSURE: 78 MMHG | OXYGEN SATURATION: 98 % | HEIGHT: 72 IN | WEIGHT: 205.5 LBS | HEART RATE: 62 BPM | BODY MASS INDEX: 27.83 KG/M2 | SYSTOLIC BLOOD PRESSURE: 138 MMHG

## 2021-11-05 DIAGNOSIS — D84.9 IMMUNOSUPPRESSION (HCC): ICD-10-CM

## 2021-11-05 DIAGNOSIS — E55.9 VITAMIN D DEFICIENCY: ICD-10-CM

## 2021-11-05 DIAGNOSIS — E78.00 PURE HYPERCHOLESTEROLEMIA: ICD-10-CM

## 2021-11-05 DIAGNOSIS — I10 ELEVATED BLOOD PRESSURE READING IN OFFICE WITH DIAGNOSIS OF HYPERTENSION: Primary | ICD-10-CM

## 2021-11-05 DIAGNOSIS — E79.0 HYPERURICEMIA: ICD-10-CM

## 2021-11-05 DIAGNOSIS — N18.4 ANEMIA IN STAGE 4 CHRONIC KIDNEY DISEASE (HCC): ICD-10-CM

## 2021-11-05 DIAGNOSIS — Z94.0 HISTORY OF KIDNEY TRANSPLANT: ICD-10-CM

## 2021-11-05 DIAGNOSIS — N18.30 STAGE 3 CHRONIC KIDNEY DISEASE, UNSPECIFIED WHETHER STAGE 3A OR 3B CKD (HCC): ICD-10-CM

## 2021-11-05 DIAGNOSIS — D63.1 ANEMIA IN STAGE 4 CHRONIC KIDNEY DISEASE (HCC): ICD-10-CM

## 2021-11-05 PROCEDURE — G9231 DOC ESRD DIA TRANS PREG: HCPCS | Performed by: PHYSICIAN ASSISTANT

## 2021-11-05 PROCEDURE — G8536 NO DOC ELDER MAL SCRN: HCPCS | Performed by: PHYSICIAN ASSISTANT

## 2021-11-05 PROCEDURE — 3017F COLORECTAL CA SCREEN DOC REV: CPT | Performed by: PHYSICIAN ASSISTANT

## 2021-11-05 PROCEDURE — G8432 DEP SCR NOT DOC, RNG: HCPCS | Performed by: PHYSICIAN ASSISTANT

## 2021-11-05 PROCEDURE — G8427 DOCREV CUR MEDS BY ELIG CLIN: HCPCS | Performed by: PHYSICIAN ASSISTANT

## 2021-11-05 PROCEDURE — 1101F PT FALLS ASSESS-DOCD LE1/YR: CPT | Performed by: PHYSICIAN ASSISTANT

## 2021-11-05 PROCEDURE — G8419 CALC BMI OUT NRM PARAM NOF/U: HCPCS | Performed by: PHYSICIAN ASSISTANT

## 2021-11-05 PROCEDURE — 99213 OFFICE O/P EST LOW 20 MIN: CPT | Performed by: PHYSICIAN ASSISTANT

## 2021-11-05 NOTE — PROGRESS NOTES
[facility] pt with two pt identifiers(name and ). Chief Complaint   Patient presents with    Hypertension    Chronic Kidney Disease       Reviewed record In preparation for visit and have obtained necessary documentation. 1. Have you been to the ER, urgent care clinic or hospitalized since your last visit? No     2. Have you seen or consulted any other health care providers outside of the [address] [place] [address] since your last visit? Include any pap smears or colon screening. No    Vitals reviewed with provider. Health Maintenance reviewed:     Health Maintenance Due   Topic    DTaP/Tdap/Td series (1 - Tdap)          Wt Readings from Last 3 Encounters:   21 205 lb 8 oz (93.2 kg)   21 200 lb 8 oz (90.9 kg)   10/01/20 192 lb 12.8 oz (87.5 kg)        Temp Readings from Last 3 Encounters:   21 97.9 °F (36.6 °C) (Oral)   21 98.5 °F (36.9 °C) (Oral)   10/01/20 97.9 °F (36.6 °C) (Temporal)        BP Readings from Last 3 Encounters:   21 (!) 169/77   21 138/60   10/01/20 (!) 167/74        Pulse Readings from Last 3 Encounters:   21 62   21 60   10/01/20 66        Vitals:    21 1101   BP: (!) 169/77   Pulse: 62   Resp: 12   Temp: 97.9 °F (36.6 °C)   TempSrc: Oral   SpO2: 98%   Weight: 205 lb 8 oz (93.2 kg)   Height: 6' (1.829 m)   PainSc:   0 - No pain          Learning Assessment:   :       Learning Assessment 2014   PRIMARY LEARNER Patient   PRIMARY LANGUAGE ENGLISH   LEARNER PREFERENCE PRIMARY VIDEOS     PICTURES     DEMONSTRATION   ANSWERED BY patient   RELATIONSHIP SELF        Depression Screening:   :       3 most recent PHQ Screens 2021   Little interest or pleasure in doing things Not at all   Feeling down, depressed, irritable, or hopeless Not at all   Total Score PHQ 2 0        Fall Risk Assessment:   :       Fall Risk Assessment, last 12 mths 2021   Able to walk?  Yes   Fall in past 12 months? 0   Do you feel unsteady? 0   Are you worried about falling 0        Abuse Screening:   :       Abuse Screening Questionnaire 5/19/2021 5/22/2019 1/17/2018   Do you ever feel afraid of your partner? N N N   Are you in a relationship with someone who physically or mentally threatens you? N N N   Is it safe for you to go home?  Y Y Y        ADL Screening:   :       ADL Assessment 5/19/2021   Feeding yourself No Help Needed   Getting from bed to chair No Help Needed   Getting dressed No Help Needed   Bathing or showering No Help Needed   Walk across the room (includes cane/walker) No Help Needed   Using the telphone No Help Needed   Taking your medications No Help Needed   Preparing meals No Help Needed   Managing money (expenses/bills) No Help Needed   Moderately strenuous housework (laundry) No Help Needed   Shopping for personal items (toiletries/medicines) No Help Needed   Shopping for groceries No Help Needed   Driving No Help Needed   Climbing a flight of stairs No Help Needed   Getting to places beyond walking distances No Help Needed

## 2022-03-18 PROBLEM — I10 ELEVATED BLOOD PRESSURE READING IN OFFICE WITH DIAGNOSIS OF HYPERTENSION: Status: ACTIVE | Noted: 2018-01-17

## 2022-03-19 PROBLEM — D84.9 IMMUNOSUPPRESSION (HCC): Status: ACTIVE | Noted: 2021-05-19

## 2022-03-19 PROBLEM — I25.10 PLAQUE IN HEART ARTERY: Status: ACTIVE | Noted: 2019-05-22

## 2022-03-19 PROBLEM — Z94.0 HISTORY OF KIDNEY TRANSPLANT: Status: ACTIVE | Noted: 2021-05-19

## 2022-03-19 PROBLEM — Z98.890 S/P BIOPSY: Status: ACTIVE | Noted: 2017-07-18

## 2022-05-03 PROBLEM — N18.5 CHRONIC KIDNEY DISEASE, STAGE 5 (HCC): Status: ACTIVE | Noted: 2022-05-03

## 2022-05-03 PROBLEM — N18.5 CHRONIC KIDNEY DISEASE, STAGE 5 (HCC): Status: RESOLVED | Noted: 2022-05-03 | Resolved: 2022-05-03

## 2022-05-04 ENCOUNTER — OFFICE VISIT (OUTPATIENT)
Dept: INTERNAL MEDICINE CLINIC | Age: 77
End: 2022-05-04
Payer: MEDICARE

## 2022-05-04 VITALS
HEIGHT: 72 IN | WEIGHT: 203.6 LBS | RESPIRATION RATE: 18 BRPM | TEMPERATURE: 98.1 F | DIASTOLIC BLOOD PRESSURE: 84 MMHG | OXYGEN SATURATION: 97 % | SYSTOLIC BLOOD PRESSURE: 138 MMHG | BODY MASS INDEX: 27.58 KG/M2 | HEART RATE: 63 BPM

## 2022-05-04 DIAGNOSIS — D84.9 IMMUNOSUPPRESSION (HCC): ICD-10-CM

## 2022-05-04 DIAGNOSIS — D63.1 ANEMIA IN STAGE 4 CHRONIC KIDNEY DISEASE (HCC): ICD-10-CM

## 2022-05-04 DIAGNOSIS — E55.9 VITAMIN D DEFICIENCY: ICD-10-CM

## 2022-05-04 DIAGNOSIS — N18.4 ANEMIA IN STAGE 4 CHRONIC KIDNEY DISEASE (HCC): ICD-10-CM

## 2022-05-04 DIAGNOSIS — I10 ELEVATED BLOOD PRESSURE READING IN OFFICE WITH DIAGNOSIS OF HYPERTENSION: ICD-10-CM

## 2022-05-04 DIAGNOSIS — Z00.00 MEDICARE ANNUAL WELLNESS VISIT, SUBSEQUENT: Primary | ICD-10-CM

## 2022-05-04 DIAGNOSIS — E79.0 HYPERURICEMIA: ICD-10-CM

## 2022-05-04 DIAGNOSIS — E78.00 PURE HYPERCHOLESTEROLEMIA: ICD-10-CM

## 2022-05-04 DIAGNOSIS — Z94.0 HISTORY OF KIDNEY TRANSPLANT: ICD-10-CM

## 2022-05-04 PROCEDURE — G0439 PPPS, SUBSEQ VISIT: HCPCS | Performed by: PHYSICIAN ASSISTANT

## 2022-05-04 PROCEDURE — G8427 DOCREV CUR MEDS BY ELIG CLIN: HCPCS | Performed by: PHYSICIAN ASSISTANT

## 2022-05-04 PROCEDURE — 1101F PT FALLS ASSESS-DOCD LE1/YR: CPT | Performed by: PHYSICIAN ASSISTANT

## 2022-05-04 PROCEDURE — G8419 CALC BMI OUT NRM PARAM NOF/U: HCPCS | Performed by: PHYSICIAN ASSISTANT

## 2022-05-04 PROCEDURE — G8432 DEP SCR NOT DOC, RNG: HCPCS | Performed by: PHYSICIAN ASSISTANT

## 2022-05-04 PROCEDURE — G8536 NO DOC ELDER MAL SCRN: HCPCS | Performed by: PHYSICIAN ASSISTANT

## 2022-05-04 PROCEDURE — 3017F COLORECTAL CA SCREEN DOC REV: CPT | Performed by: PHYSICIAN ASSISTANT

## 2022-05-04 PROCEDURE — G9231 DOC ESRD DIA TRANS PREG: HCPCS | Performed by: PHYSICIAN ASSISTANT

## 2022-05-04 PROCEDURE — 99214 OFFICE O/P EST MOD 30 MIN: CPT | Performed by: PHYSICIAN ASSISTANT

## 2022-05-04 RX ORDER — HYDROCORTISONE 25 MG/G
CREAM TOPICAL
COMMUNITY
Start: 2022-04-21

## 2022-05-04 RX ORDER — NIACINAMIDE 500 MG
TABLET ORAL
COMMUNITY

## 2022-05-04 NOTE — PROGRESS NOTES
Chief Complaint   Patient presents with    Follow-up     6 month HTN     3 most recent PHQ Screens 5/4/2022   Little interest or pleasure in doing things Not at all   Feeling down, depressed, irritable, or hopeless Not at all   Total Score PHQ 2 0     Abuse Screening Questionnaire 5/4/2022   Do you ever feel afraid of your partner? N   Are you in a relationship with someone who physically or mentally threatens you? N   Is it safe for you to go home? Y     Visit Vitals  BP (!) 163/82 (BP 1 Location: Right upper arm, BP Patient Position: Sitting, BP Cuff Size: Large adult)   Pulse 63   Temp 98.1 °F (36.7 °C) (Oral)   Resp 18   Ht 6' (1.829 m)   Wt 203 lb 9.6 oz (92.4 kg)   SpO2 97%   BMI 27.61 kg/m²     1. Have you been to the ER, urgent care clinic since your last visit? Hospitalized since your last visit? no  2. Have you seen or consulted any other health care providers outside of the [address] [place] [address] since your last visit? Include any pap smears or colon screening.  no

## 2022-05-04 NOTE — PROGRESS NOTES
This is the Subsequent Medicare Annual Wellness Exam, performed 12 months or more after the Initial AWV or the last Subsequent AWV    I have reviewed the patient's medical history in detail and updated the computerized patient record. Assessment/Plan   Education and counseling provided:  Are appropriate based on today's review and evaluation  End-of-Life planning (with patient's consent)    1. Medicare annual wellness visit, subsequent       Depression Risk Factor Screening     3 most recent PHQ Screens 11/5/2021   Little interest or pleasure in doing things Not at all   Feeling down, depressed, irritable, or hopeless Not at all   Total Score PHQ 2 0       Alcohol & Drug Abuse Risk Screen    Do you average more than 1 drink per night or more than 7 drinks a week: No    In the past three months have you have had more than 4 drinks containing alcohol on one occasion: No          Functional Ability and Level of Safety    Hearing: Hearing is good. Activities of Daily Living: The home contains: handrails and grab bars  Patient does total self care      Ambulation: with no difficulty     Fall Risk:  Fall Risk Assessment, last 12 mths 5/19/2021   Able to walk? Yes   Fall in past 12 months? 0   Do you feel unsteady?  0   Are you worried about falling 0      Abuse Screen:  Patient is not abused       Cognitive Screening    Has your family/caregiver stated any concerns about your memory: no     Cognitive Screening: Normal - Animal Naming Test, Mini Cog Test    Health Maintenance Due     Health Maintenance Due   Topic Date Due    DTaP/Tdap/Td series (1 - Tdap) 08/15/2007    COVID-19 Vaccine (4 - Booster for [employer] series) 12/08/2021    Medicare Yearly Exam  05/20/2022       Patient Care Team   Patient Care Team:  Jose Vázquez PA-C as PCP - General (Physician Assistant)  Jose Vázquez PA-C as PCP - [facility] Empaneled Provider  Osmany Whitehead MD as Consulting Provider (Nephrology)  Karl You MD (Hematology and Oncology)  Other, MD Alfredo (Optometry)    History     Patient Active Problem List   Diagnosis Code    Pure hypercholesterolemia E78.00    Hyperuricemia E79.0    Chronic kidney disease, stage III (moderate) ([facility] [address] N18.30    Proteinuria R80.9    Vitamin D deficiency E55.9    Anemia in chronic renal disease N18.9, D63.1    S/P biopsy Z98.890    Elevated blood pressure reading in office with diagnosis of hypertension I10    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 Nakita Espinal [facility])     skin    Chronic kidney disease     Dr John Soto stage 4    Contact dermatitis and other eczema, due to unspecified cause     rosacea, and AK    Gingivitis     Gout     History of blood transfusion     History of chicken pox     Hypercholesterolemia     Hypertension     Monoclonal gammopathy of unknown significance (MGUS)     per [facility] note of 7/12/19    Renal cyst     woody  5/4/16    Screening for glaucoma 12/29/2016    nury lechuga note    Trauma      5 fx ribs and splenic injury 6/10      Past Surgical History:   Procedure Laterality Date    CT HEART W/O CONT WITH CALCIUM  12/08    score of 46    EGD  11/18/09    dr Patrice Isaac grade 1 esophagitis    ENDOSCOPY, COLON, DIAGNOSTIC  6/16/2014 6/16/14 with dr. Heredia/11/09 dr Patrice Isaac diverticula and 10 year repeat    HX COLONOSCOPY      HX OTHER SURGICAL      basal cell CA removal    HX TONSILLECTOMY  1952    HX WISDOM TEETH EXTRACTION       Current Outpatient Medications   Medication Sig Dispense Refill    tacrolimus (PROGRAF) 1 mg capsule TAKE TWO CAPSULES BY MOUTH EVERY MORNING AND TAKE ONE CAPSULE EVERY EVENING      azaTHIOprine (IMURAN) 50 mg tablet 50 mg = 1 tab each dose, PO, daily, icd:z94.0, # 30 tab, 11 Refills, Pharmacy: [facility]      predniSONE (DELTASONE) 5 mg tablet TAKE 1 TABLET BY MOUTH DAILY      famotidine (PEPCID) 20 mg tablet 20 mg.      cholecalciferol, vitamin D3, 50 mcg (2,000 unit) tab EVERY DAY AT BEDTIME      sennosides 17.2 mg tab 1 Tablet.  tamsulosin (FLOMAX) 0.4 mg capsule TAKE ONE CAPSULE BY MOUTH EVERY DAY      lutein 20 mg cap 20 mg.      folic acid 810 mcg tablet 0.4 mg.      nicotinic acid (NIACIN) 500 mg tablet 500 mg = 1 tab each dose, PO, daily, 0 Refills      amLODIPine (NORVASC) 10 mg tablet TAKE 1 TABLET BY MOUTH EVERY DAY  5    carvedilol (COREG) 6.25 mg tablet TAKE 1 TABLET BY MOUTH TWICE A DAY WITH FOOD  5    KRILL OIL PO Take 1 Tab by mouth daily.  vitamin a-vitamin c-vit e-min (OCUVITE) tablet Take 1 Tab by mouth daily.  aspirin delayed-release 81 mg tablet Take 81 mg by mouth daily.  loratadine (CLARITIN) 10 mg tablet Take 10 mg by mouth daily.  vitamin E (AQUA GEMS) 400 unit capsule Take 400 Units by mouth two (2) times a day. No Known Allergies    Family History   Problem Relation Age of Onset    Hypertension Mother     Lung Cancer Mother         smoker    Uterine Cancer Mother     Heart Disease Father     Lung Cancer Father         smoker    Arthritis Sister     Arthritis Brother     Cancer Brother         hairy cell leukemia    Diabetes Maternal Grandmother     Arthritis Sister      Social History     Tobacco Use    Smoking status: Never Smoker    Smokeless tobacco: Never Used   Substance Use Topics    Alcohol use: No         CHICHI Granger. is a 77y.o. year old male seen in clinic today for No chief complaint on file. he is here today to follow up for HTN, HLD. Hx of kidney transplant sp ESRD. Currently following with Dr. Fernanda Diaz every 4 months for labs sp kidney transplant. Labs have looked good per pt. Did not bring with him.  Last [facility] transplant labs in system from [facility]. Only seeing them once a year now. HLD: Lipids high last check but not able to take meds due to interference with immunosuppression/transplant meds. Uses krill oil, niacin daily  HTN: At goal. At home systolic runs low 386'J. Uses amlodipine 10 mg, flomax 0.4 mg, carvedilol 6.25 mg BID    CKD: Last GFR in stage 3 sp transplant. No problems with transplant meds at this time. Feels well overall. Following with derm for multiple skin cancers. Using creams for areas around nose BL. Had removal on L forearm. Going back for area around R eye.       he specifically denies any CP, SOB, HA, dizziness, fevers, chills, N/V/D, urinary symptoms or other bowel changes. Current Outpatient Medications on File Prior to Visit   Medication Sig Dispense Refill    tacrolimus (PROGRAF) 1 mg capsule TAKE TWO CAPSULES BY MOUTH EVERY MORNING AND TAKE ONE CAPSULE EVERY EVENING      azaTHIOprine (IMURAN) 50 mg tablet 50 mg = 1 tab each dose, PO, daily, icd:z94.0, # 30 tab, 11 Refills, [facility]: [facility]      predniSONE (DELTASONE) 5 mg tablet TAKE 1 TABLET BY MOUTH DAILY      famotidine (PEPCID) 20 mg tablet 20 mg.      cholecalciferol, vitamin D3, 50 mcg (2,000 unit) tab EVERY DAY AT BEDTIME      sennosides 17.2 mg tab 1 Tablet.  tamsulosin (FLOMAX) 0.4 mg capsule TAKE ONE CAPSULE BY MOUTH EVERY DAY      lutein 20 mg cap 20 mg.      folic acid 890 mcg tablet 0.4 mg.      nicotinic acid (NIACIN) 500 mg tablet 500 mg = 1 tab each dose, PO, daily, 0 Refills      amLODIPine (NORVASC) 10 mg tablet TAKE 1 TABLET BY MOUTH EVERY DAY  5    carvedilol (COREG) 6.25 mg tablet TAKE 1 TABLET BY MOUTH TWICE A DAY WITH FOOD  5    KRILL OIL PO Take 1 Tab by mouth daily.  vitamin a-vitamin c-vit e-min (OCUVITE) tablet Take 1 Tab by mouth daily.  aspirin delayed-release 81 mg tablet Take 81 mg by mouth daily.       loratadine (CLARITIN) 10 mg tablet Take 10 mg by mouth daily.  vitamin E (AQUA GEMS) 400 unit capsule Take 400 Units by mouth two (2) times a day. No current facility-administered medications on file prior to visit.          No Known Allergies  Past Medical History:   Diagnosis Date    Advance directive discussed with patient     Allergic rhinitis     Anemia NEC     r/t chronic renal insufficiency, followed by Dr. Mitchell Leader [facility])     skin    Chronic kidney disease     Dr Rahul Barry stage 4    Contact dermatitis and other eczema, due to unspecified cause     rosacea, and AK    Gingivitis     Gout     History of blood transfusion     History of chicken pox     Hypercholesterolemia     Hypertension     Monoclonal gammopathy of unknown significance (MGUS)     per [facility] note [facility] 7/12/19    Renal cyst     woody  5/4/16    Screening for glaucoma 12/29/2016    nury lechuga note    Trauma      5 fx ribs and splenic injury 6/10      Past Surgical History:   Procedure Laterality Date    CT HEART W/O CONT WITH CALCIUM  12/08    score of 55    EGD  11/18/09    dr Jeff Watson grade 1 esophagitis    ENDOSCOPY, COLON, DIAGNOSTIC  6/16/2014 6/16/14 with dr. Heredia/11/09 dr Jeff Watson diverticula and 10 year repeat    HX COLONOSCOPY      HX OTHER SURGICAL      basal cell CA removal    HX TONSILLECTOMY  1952    HX WISDOM TEETH EXTRACTION          Family History   Problem Relation Age of Onset    Hypertension Mother     Lung Cancer Mother         smoker    Uterine Cancer Mother     Heart Disease Father     Lung Cancer Father         smoker    Arthritis Sister     Arthritis Brother     Cancer Brother         hairy cell leukemia    Diabetes Maternal Grandmother     Arthritis Sister         Social History     Socioeconomic History    Marital status:      Spouse name: Not on file    Number of children: Not on file    Years of education: Not on file    Highest education level: Not on file   Occupational History    Not on file Tobacco Use    Smoking status: Never Smoker    Smokeless tobacco: Never Used   Vaping Use    Vaping Use: Never used   Substance and Sexual Activity    Alcohol use: No    Drug use: No    Sexual activity: Yes     Partners: Female   Other Topics Concern    Not on file   Social History Narrative    Not on file     Social Determinants of Health     Financial Resource Strain:     Difficulty of Paying Living Expenses: Not on file   Food Insecurity:     Worried About Running Out of Food in the Last Year: Not on file    Angelito of Food in the Last Year: Not on file   Transportation Needs:     Lack of Transportation (Medical): Not on file    Lack of Transportation (Non-Medical): Not on file   Physical Activity:     Days of Exercise per Week: Not on file    Minutes of Exercise per Session: Not on file   Stress:     Feeling of Stress : Not on file   Social Connections:     Frequency of Communication with Friends and Family: Not on file    Frequency of Social Gatherings with Friends and Family: Not on file    Attends Quaker Services: Not on file    Active Member of [address] [place] [address] or Organizations: Not on file    Attends Club or Organization Meetings: Not on file    Marital Status: Not on file   Intimate Partner Violence:     Fear of Current or Ex-Partner: Not on file    Emotionally Abused: Not on file    Physically Abused: Not on file    Sexually Abused: Not on file   Housing Stability:     Unable to Pay for Housing in the Last Year: Not on file    Number of [place] in the Last Year: Not on file    Unstable Housing in the Last Year: Not on file           There were no vitals taken for this visit. Review of Systems   Constitutional: Negative for chills, fever, malaise/fatigue and weight loss. Respiratory: Negative for cough, shortness of breath and wheezing. Cardiovascular: Negative for chest pain, palpitations and leg swelling.    Gastrointestinal: Negative for abdominal pain, blood in stool, constipation, diarrhea, heartburn, melena, nausea and vomiting. Genitourinary: Negative for dysuria and frequency. Musculoskeletal: Negative for myalgias. Skin: Negative for rash. Neurological: Negative for dizziness, weakness and headaches. Endo/Heme/Allergies: Does not bruise/bleed easily. Psychiatric/Behavioral: Negative for depression. All other systems reviewed and are negative. Physical Exam  Vitals and nursing note reviewed. Constitutional:       Appearance: Normal appearance. He is normal weight. HENT:      Head: Normocephalic and atraumatic. Comments: Erythematous area to BL cheeks     Right Ear: External ear normal.      Left Ear: External ear normal.      Nose: Nose normal.      Mouth/Throat:      Mouth: Mucous membranes are moist.      Pharynx: Oropharynx is clear. No oropharyngeal exudate or posterior oropharyngeal erythema. Eyes:      Conjunctiva/sclera: Conjunctivae normal.      Pupils: Pupils are equal, round, and reactive to light. Neck:      Vascular: No carotid bruit. Cardiovascular:      Rate and Rhythm: Normal rate and regular rhythm. Pulses: Normal pulses. Heart sounds: Normal heart sounds. No murmur heard. Pulmonary:      Effort: Pulmonary effort is normal.      Breath sounds: Normal breath sounds. No stridor. No wheezing, rhonchi or rales. Abdominal:      General: Abdomen is flat. Bowel sounds are normal. There is no distension. Tenderness: There is no abdominal tenderness. There is no guarding. Musculoskeletal:         General: Normal range of motion. Cervical back: Normal range of motion and neck supple. No rigidity. Right lower leg: No edema. Left lower leg: No edema. Comments: Scar to LUE from attempted fistula placement. Bandaid to L forearm from skin cancer removal   Skin:     General: Skin is dry. Capillary Refill: Capillary refill takes less than 2 seconds.    Neurological:      General: No focal deficit present. Mental Status: He is alert and oriented to person, place, and time. Mental status is at baseline. Sensory: No sensory deficit. Motor: No weakness. Coordination: Coordination normal.   Psychiatric:         Mood and Affect: Mood normal.          No results found for this or any previous visit (from the past 24 hour(s)). ASSESSMENT AND PLAN  Diagnoses and all orders for this visit:    1. Medicare annual wellness visit, subsequent    2. Immunosuppression (HCC)  Currently on prednisone, Tacrolimus, Imuran  3. Anemia in stage 4 chronic kidney disease ([facility])  Now following with Dr. Iveth Virgen every 4 months, labs done through them q 4 months  4. History of kidney transplant  [facility] once [facility] year with transplant team  5. Vitamin D deficiency  Continue 2000 units a day  6. Pure hypercholesterolemia  -     LIPID PANEL; Future  Avoid cholesterol lowering medicines while on immunosuppresion meds. Niacin and krill oil for now. Send for lipids at next lab draw with nephrology  7. Elevated blood pressure reading in office with diagnosis of hypertension  At goal today. Runs around 140  8. Hyperuricemia  Allopurinol not recommended with immunosuppresion meds         I have discussed the diagnosis with the patient and the intended plan as seen in the above orders. Patient is in agreement. The patient has received an after-visit summary and questions were answered concerning future plans. I have discussed medication side effects and warnings with the patient as well.     Serjio Meyers PA-C

## 2022-05-04 NOTE — PATIENT INSTRUCTIONS
Medicare Wellness Visit, Male    The best way to live healthy is to have a lifestyle where you eat a well-balanced diet, exercise regularly, limit alcohol use, and quit all forms of tobacco/nicotine, if applicable. Regular preventive services are another way to keep healthy. Preventive services (vaccines, screening tests, monitoring & exams) can help personalize your care plan, which helps you manage your own care. Screening tests can find health problems at the earliest stages, when they are easiest to treat. [facility] follows the current, evidence-based guidelines published by the [facility] ([facility]) when recommending preventive services for our patients. Because we follow these guidelines, sometimes recommendations change over time as research supports it. (For example, a prostate screening blood test is no longer routinely recommended for men with no symptoms). Of course, you and your doctor may decide to screen more often for some diseases, based on your risk and co-morbidities (chronic disease you are already diagnosed with). Preventive services for you include:  - Medicare offers their members a free annual wellness visit, which is time for you and your primary care provider to discuss and plan for your preventive service needs. Take advantage of this benefit every year!  -All adults over age 72 should receive the recommended pneumonia vaccines. Current USPSTF guidelines recommend a series of two vaccines for the best pneumonia protection.   -All adults should have a flu vaccine yearly and tetanus vaccine every 10 years.  -All adults age 48 and older should receive the shingles vaccines (series of two vaccines).        -All adults age 38-68 who are overweight should have a diabetes screening test once every three years.   -Other screening tests & preventive services for persons with diabetes include: an eye exam to screen for diabetic retinopathy, a kidney function test, a foot exam, and stricter control over your cholesterol.   -Cardiovascular screening for adults with routine risk involves an electrocardiogram (ECG) at intervals determined by the provider.   -Colorectal cancer screening should be done for adults age 54-65 with no increased risk factors for colorectal cancer. There are a number of acceptable methods of screening for this type of cancer. Each test has its own benefits and drawbacks. Discuss with your provider what is most appropriate for you during your annual wellness visit. The different tests include: colonoscopy (considered the best screening method), a fecal occult blood test, a fecal DNA test, and sigmoidoscopy.  -All adults born between [facility] should be screened once for Hepatitis C.  -An Abdominal Aortic Aneurysm (AAA) Screening is recommended for men age 73-68 who has ever smoked in their lifetime.      Here is a list of your current Health Maintenance items (your personalized list of preventive services) with a due date:  Health Maintenance Due   Topic Date Due    DTaP/Tdap/Td  (1 - Tdap) 08/15/2007    COVID-19 Vaccine (4 - Booster for [employer] series) 12/08/2021    Annual Well Visit  05/20/2022

## 2022-05-22 LAB
CREATININE, EXTERNAL: 1.97
LDL-C, EXTERNAL: 107

## 2022-06-13 ENCOUNTER — PATIENT MESSAGE (OUTPATIENT)
Dept: INTERNAL MEDICINE CLINIC | Age: 77
End: 2022-06-13

## 2022-06-13 DIAGNOSIS — D84.9 IMMUNOSUPPRESSION (HCC): Primary | ICD-10-CM

## 2022-06-13 RX ORDER — AZATHIOPRINE 50 MG/1
TABLET ORAL
Qty: 30 TABLET | Refills: 2 | Status: SHIPPED | OUTPATIENT
Start: 2022-06-13 | End: 2022-09-12

## 2022-06-13 NOTE — TELEPHONE ENCOUNTER
From: Erik Bains.   To: [facility] [place]  Sent: 2022 12:02 PM EDT  Subject: Need new Rx     Prescription for Azathioprine 50 mg has  from [facility] need new one sent to [address] [place] [address] on record  Direct to [employer] [facility] [employer]

## 2022-09-12 RX ORDER — AZATHIOPRINE 50 MG/1
TABLET ORAL
Qty: 30 TABLET | Refills: 2 | Status: SHIPPED | OUTPATIENT
Start: 2022-09-12

## 2022-12-02 ENCOUNTER — OFFICE VISIT (OUTPATIENT)
Dept: INTERNAL MEDICINE CLINIC | Age: 77
End: 2022-12-02
Payer: MEDICARE

## 2022-12-02 VITALS
SYSTOLIC BLOOD PRESSURE: 128 MMHG | TEMPERATURE: 97.7 F | RESPIRATION RATE: 12 BRPM | DIASTOLIC BLOOD PRESSURE: 60 MMHG | HEIGHT: 73 IN | BODY MASS INDEX: 26.11 KG/M2 | WEIGHT: 197 LBS | OXYGEN SATURATION: 98 %

## 2022-12-02 DIAGNOSIS — D84.9 IMMUNOSUPPRESSION (HCC): ICD-10-CM

## 2022-12-02 DIAGNOSIS — N18.30 STAGE 3 CHRONIC KIDNEY DISEASE, UNSPECIFIED WHETHER STAGE 3A OR 3B CKD (HCC): ICD-10-CM

## 2022-12-02 DIAGNOSIS — Z94.0 HISTORY OF KIDNEY TRANSPLANT: ICD-10-CM

## 2022-12-02 DIAGNOSIS — E78.00 PURE HYPERCHOLESTEROLEMIA: ICD-10-CM

## 2022-12-02 DIAGNOSIS — I10 ELEVATED BLOOD PRESSURE READING IN OFFICE WITH DIAGNOSIS OF HYPERTENSION: Primary | ICD-10-CM

## 2022-12-02 PROBLEM — T86.10: Status: ACTIVE | Noted: 2022-01-11
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
## 2022-12-02 PROBLEM — N17.9 ACUTE KIDNEY FAILURE, UNSPECIFIED (HCC): Status: ACTIVE | Noted: 2022-12-02

## 2022-12-02 PROBLEM — A08.39 OTHER VIRAL ENTERITIS: Status: ACTIVE | Noted: 2022-12-02

## 2022-12-02 PROBLEM — B25.9 CYTOMEGALOVIRUS INFECTION (HCC): Status: ACTIVE | Noted: 2022-01-11

## 2022-12-02 PROBLEM — R62.7 ADULT FAILURE TO THRIVE: Status: ACTIVE | Noted: 2022-12-02

## 2022-12-02 PROBLEM — R31.0 GROSS HEMATURIA: Status: ACTIVE | Noted: 2022-12-02

## 2022-12-02 PROBLEM — D89.9 DISORDER INVOLVING THE IMMUNE MECHANISM, UNSPECIFIED (HCC): Status: ACTIVE | Noted: 2022-12-02

## 2022-12-02 PROBLEM — M1A.9XX0 CHRONIC GOUT, UNSPECIFIED, WITHOUT TOPHUS (TOPHI): Status: ACTIVE | Noted: 2022-12-02
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
## 2022-12-02 PROBLEM — T85.628A: Status: ACTIVE | Noted: 2022-12-02

## 2022-12-02 PROBLEM — N13.1 HYDRONEPHROSIS WITH URETERAL STRICTURE, NOT ELSEWHERE CLASSIFIED: Status: ACTIVE | Noted: 2022-12-02

## 2022-12-02 PROBLEM — E86.0 DEHYDRATION: Status: ACTIVE | Noted: 2022-12-02

## 2022-12-02 PROBLEM — R13.10 DYSPHAGIA, UNSPECIFIED: Status: ACTIVE | Noted: 2022-12-02

## 2022-12-02 NOTE — PROGRESS NOTES
Oumar 1938. Identified pt with two pt identifiers(name and ). Chief Complaint   Patient presents with    Hypertension    Chronic Kidney Disease    Cholesterol Problem       Reviewed record In preparation for visit and have obtained necessary documentation. 1. Have you been to the ER, urgent care clinic or hospitalized since your last visit? No     2. Have you seen or consulted any other health care providers outside of the [address] [place] [address] since your last visit? Include any pap smears or colon screening. No    Vitals reviewed with provider. Health Maintenance reviewed:     Health Maintenance Due   Topic    DTaP/Tdap/Td series (1 - Tdap)          Wt Readings from Last 3 Encounters:   22 197 lb (89.4 kg)   22 203 lb 9.6 oz (92.4 kg)   21 205 lb 8 oz (93.2 kg)        Temp Readings from Last 3 Encounters:   22 97.7 °F (36.5 °C) (Oral)   22 98.1 °F (36.7 °C) (Oral)   21 97.9 °F (36.6 °C) (Oral)        BP Readings from Last 3 Encounters:   22 (!) 152/76   22 138/84   21 138/78        Pulse Readings from Last 3 Encounters:   22 63   21 62   21 60        Vitals:    22 1327   BP: (!) 152/76   Resp: 12   Temp: 97.7 °F (36.5 °C)   TempSrc: Oral   SpO2: 98%   Weight: 197 lb (89.4 kg)   Height: 6' 1\" (1.854 m)   PainSc:   0 - No pain          Learning Assessment:   :       Learning Assessment 2014   PRIMARY LEARNER Patient   PRIMARY LANGUAGE ENGLISH   LEARNER PREFERENCE PRIMARY VIDEOS     PICTURES     DEMONSTRATION   ANSWERED BY patient   RELATIONSHIP SELF        Depression Screening:   :       3 most recent PHQ Screens 2022   Little interest or pleasure in doing things Not at all   Feeling down, depressed, irritable, or hopeless Not at all   Total Score PHQ 2 0        Fall Risk Assessment:   :       Fall Risk Assessment, last 12 mths 2022   Able to walk?  Yes   Fall in past 12 months? 0   Do you feel unsteady? -   Are you worried about falling -        Abuse Screening:   :       Abuse Screening Questionnaire 12/2/2022 5/4/2022 5/19/2021 5/22/2019 1/17/2018   Do you ever feel afraid of your partner? N N N N N   Are you in a relationship with someone who physically or mentally threatens you? N N N N N   Is it safe for you to go home?  Y Y Y Y Y        ADL Screening:   :       ADL Assessment 12/2/2022   Feeding yourself No Help Needed   Getting from bed to chair No Help Needed   Getting dressed No Help Needed   Bathing or showering No Help Needed   Walk across the room (includes cane/walker) No Help Needed   Using the telphone No Help Needed   Taking your medications No Help Needed   Preparing meals No Help Needed   Managing money (expenses/bills) No Help Needed   Moderately strenuous housework (laundry) No Help Needed   Shopping for personal items (toiletries/medicines) No Help Needed   Shopping for groceries No Help Needed   Driving No Help Needed   Climbing a flight of stairs No Help Needed   Getting to places beyond walking distances No Help Needed

## 2022-12-02 NOTE — PROGRESS NOTES
Brad Ochoa. is a 69y.o. year old male seen in clinic today for   Chief Complaint   Patient presents with    Hypertension    Chronic Kidney Disease    Cholesterol Problem       he is here today to follow up for HTN, CKD, HLD    Patient has has hx of HLD. Not able to take statins due to interactions with anti-rejection drugs. Using krill oil and niacin. Following with Josey Urrutia every 4 months. Saw [facility] transplant team. Stopped Imuran due to facial break outs. Creatinine 1.8 down from 1.9 in June. Stable urine flow. Using Tacrolimus and prednisone. Hypertension: Controlled with tamsulosin 0.4, carvedilol 6.25 bid, amlodipine 10 mg  Compliant with medications? always  Compliant with diet? Yes, low salt  Compliant with exercise? Yard work  Average blood pressure readings outside of office. [phone] systolic/80    Denies any HA, dizziness, CP, SOB, edema, visual changes      he specifically denies any CP, SOB, HA. Dizziness, fevers, chills, N/V/D, urinary symptoms or other bowel changes. Current Outpatient Medications on File Prior to Visit   Medication Sig Dispense Refill    hydrocortisone (HYTONE) 2.5 % topical cream APPLY TO THE AFFECTED AREA TWICE DAILY FOR 7 DAYS FOLLOWING THE COMPLETION OF 5FU      niacinamide 500 mg tablet Niacin (niacinamide) 500 mg tablet   Take by oral route. tacrolimus (PROGRAF) 1 mg capsule Take 2 capsules In the morning      predniSONE (DELTASONE) 5 mg tablet TAKE 1 TABLET BY MOUTH DAILY      famotidine (PEPCID) 20 mg tablet 20 mg.      cholecalciferol, vitamin D3, 50 mcg (2,000 unit) tab EVERY DAY AT BEDTIME      sennosides 17.2 mg tab 1 Tablet. tamsulosin (FLOMAX) 0.4 mg capsule TAKE ONE CAPSULE BY MOUTH EVERY DAY      lutein 20 mg cap 20 mg.      folic acid 470 mcg tablet 0.4 mg.       amLODIPine (NORVASC) 10 mg tablet TAKE 1 TABLET BY MOUTH EVERY DAY  5    carvedilol (COREG) 6.25 mg tablet TAKE 1 TABLET BY MOUTH TWICE A DAY WITH FOOD  5    vitamin a-vitamin c-vit e-min (OCUVITE) tablet Take 1 Tab by mouth daily. aspirin delayed-release 81 mg tablet Take 81 mg by mouth daily. vitamin E (AQUA GEMS) 400 unit capsule Take 400 Units by mouth two (2) times a day. azaTHIOprine (IMURAN) 50 mg tablet TAKE 1 TABLET BY MOUTH DAILY (Patient not taking: Reported on 12/2/2022) 30 Tablet 2    KRILL OIL PO Take 1 Tab by mouth daily. (Patient not taking: Reported on 12/2/2022)      loratadine (CLARITIN) 10 mg tablet Take 10 mg by mouth daily. (Patient not taking: Reported on 12/2/2022)       No current facility-administered medications on file prior to visit.          Allergies   Allergen Reactions    Allopurinol Other (comments)     Reaction Type: Side Effect; Reaction(s): Drug interaction with drug       Past Medical History:   Diagnosis Date    Advance directive discussed with patient     Allergic rhinitis     Anemia NEC     r/t chronic renal insufficiency, followed by Dr. Holland Sosa [facility])     skin    Chronic kidney disease     Dr Deb Sarabia stage 4    Contact dermatitis and other eczema, due to unspecified cause     rosacea, and AK    Gingivitis     Gout     History of blood transfusion     History of chicken pox     Hypercholesterolemia     Hypertension     Monoclonal gammopathy of unknown significance (MGUS)     per [facility] note of 7/12/19    Renal cyst     woody  5/4/16    Screening for glaucoma 12/29/2016    [facility] lech[facility] note    Trauma      5 fx ribs and splenic injury 6/10      Past Surgical History:   Procedure Laterality Date    CT HEART W/O CONT WITH CALCIUM  12/08    score of 46    EGD  11/18/09    dr Isaías Stephen grade 1 esophagitis    ENDOSCOPY, COLON, DIAGNOSTIC  6/16/2014 6/16/14 with dr. Heredia/11/09 dr Isaías Stephen diverticula and 10 year repeat    HX COLONOSCOPY      HX OTHER SURGICAL      basal cell CA removal    HX TONSILLECTOMY  1952    HX WISDOM TEETH EXTRACTION          Family History   Problem Relation Age of Onset    Hypertension Mother     Lung Cancer Mother         smoker    Uterine Cancer Mother     Heart Disease Father     Lung Cancer Father         smoker    Arthritis Sister     Arthritis Brother     Cancer Brother         hairy cell leukemia    Diabetes Maternal Grandmother     Arthritis Sister         Social History     Socioeconomic History    Marital status:      Spouse name: Not on file    Number of children: Not on file    Years of education: Not on file    Highest education level: Not on file   Occupational History    Not on file   Tobacco Use    Smoking status: Never    Smokeless tobacco: Never   Vaping Use    Vaping Use: Never used   Substance and Sexual Activity    Alcohol use: No    Drug use: No    Sexual activity: Yes     Partners: Female   Other Topics Concern    Not on file   Social History Narrative    Not on file     Social Determinants of Health     Financial Resource Strain: Not on file   Food Insecurity: Not on file   Transportation Needs: Not on file   Physical Activity: Not on file   Stress: Not on file   Social Connections: Not on file   Intimate Partner Violence: Not on file   Housing Stability: Not on file           Visit Vitals  BP (!) 152/76 (BP 1 Location: Right upper arm, BP Patient Position: Sitting)   Temp 97.7 °F (36.5 °C) (Oral)   Resp 12   Ht 6' 1\" (1.854 m)   Wt 197 lb (89.4 kg)   SpO2 98%   BMI 25.99 kg/m²       Review of Systems   Constitutional:  Negative for chills, fever, malaise/fatigue and weight loss. Respiratory:  Negative for cough, shortness of breath and wheezing. Cardiovascular:  Negative for chest pain, palpitations and leg swelling. Gastrointestinal:  Negative for abdominal pain, blood in stool, constipation, diarrhea, heartburn, melena, nausea and vomiting. Genitourinary:  Negative for dysuria and frequency. Musculoskeletal:  Negative for myalgias. Skin:  Negative for rash. Neurological:  Negative for dizziness, weakness and headaches.    Endo/Heme/Allergies:  Does not bruise/bleed easily. Psychiatric/Behavioral:  Negative for depression. All other systems reviewed and are negative. Physical Exam  Vitals and nursing note reviewed. Constitutional:       Appearance: Normal appearance. HENT:      Head: Normocephalic and atraumatic. Right Ear: Tympanic membrane, ear canal and external ear normal.      Left Ear: Tympanic membrane, ear canal and external ear normal.      Nose: Nose normal.      Mouth/Throat:      Mouth: Mucous membranes are moist.      Pharynx: Oropharynx is clear. No oropharyngeal exudate or posterior oropharyngeal erythema. Eyes:      Conjunctiva/sclera: Conjunctivae normal.      Pupils: Pupils are equal, round, and reactive to light. Neck:      Vascular: No carotid bruit. Cardiovascular:      Rate and Rhythm: Normal rate and regular rhythm. Pulses: Normal pulses. Heart sounds: Normal heart sounds. No murmur heard. Pulmonary:      Effort: Pulmonary effort is normal.      Breath sounds: Normal breath sounds. No stridor. No wheezing, rhonchi or rales. Abdominal:      General: Abdomen is flat. Bowel sounds are normal. There is no distension. Tenderness: There is no abdominal tenderness. There is no guarding. Musculoskeletal:         General: Normal range of motion. Cervical back: Normal range of motion and neck supple. No rigidity. Right lower leg: Edema (trace non-pitting BLLE) present. Left lower leg: Edema present. Skin:     General: Skin is dry. Capillary Refill: Capillary refill takes less than 2 seconds. Neurological:      General: No focal deficit present. Mental Status: He is oriented to person, place, and time. Mental status is at baseline. Psychiatric:         Mood and Affect: Mood normal.        No results found for this or any previous visit (from the past 24 hour(s)). ASSESSMENT AND PLAN  Diagnoses and all orders for this visit:    1.  Elevated blood pressure reading in office with diagnosis of hypertension  Bp at goal, continue amlodipine, carvedilol, tamsulosin   2. Pure hypercholesterolemia  Checked in October , LDL borderline high  3. Immunosuppression ([facility] [address]  Continue follow yearly with transplant team, nephrology every 4 months, BP stable, tacrolimus level stable, A1C 5.8   4. History of kidney transplant    5. Stage 3 chronic kidney disease, unspecified whether stage 3a or 3b CKD (HCC)   Stable, 3b      I have discussed the diagnosis with the patient and the intended plan as seen in the above orders. Patient is in agreement. The patient has received an after-visit summary and questions were answered concerning future plans. I have discussed medication side effects and warnings with the patient as well.     Dominic Dalton PA-C

## 2023-01-01 PROBLEM — E86.0 DEHYDRATION: Status: RESOLVED | Noted: 2022-12-02 | Resolved: 2023-01-01

## 2023-03-23 NOTE — OP NOTES
[facility]  OPERATIVE REPORT    Name:  Keron Garrett  MR#:  [id number]  :  1945  ACCOUNT #:  [de-identified]  DATE OF SERVICE:  2019    PREOPERATIVE DIAGNOSIS:  End-stage renal disease. POSTOPERATIVE DIAGNOSIS:  End-stage renal disease. PROCEDURE PERFORMED:  Creation of a proximal radial artery to cephalic vein fistula in the left arm. SURGEON:  Siddhartha Rodriguez MD    ANESTHESIA:  Regional block. ESTIMATED BLOOD LOSS:  none. INDICATIONS:  The patient is a 20-year-old who is in need of permanent hemodialysis access. Preoperative vein survey suggested the use of the vein in his left arm and thereafter sympathectomy associated with the regional block in the holding area. The cephalic vein appeared to be the optimal conduit. PROCEDURE:  As follows:  After informed consent, the patient was placed supine on the operating table. After adequate induction of regional block anesthesia, site and patient confirmation, administration of prophylactic antibiotics, the left arm was prepped and draped in a sterile fashion. A longitudinal incision was made just below the antecubital crease and cephalic vein dissected free and controlled. The patient was systematically heparinized. The vein was ligated distally, rotated deep and medially and anastomosed end-to-side using continuous Prolene suture to the proximal most portion of the radial artery. After completion of the procedure, there was a nice thrill and audible bruit over the fistula. The wound was closed in 2 layers of absorbable suture. There is palpable radial pulse at the wrist.  The patient tolerated the procedure well with no complications.       Vioelta Blair MD      [id number]  D:  2019 8:43  T:  2019 9:16  JOB #:  [id number]  CC:  Siddhartha Rodriguez MD       [address] Verbal/written post procedure instructions were given to patient/caregiver/Care for catheter as per unit/ICU protocols Care for catheter as per unit/ICU protocols Care for catheter as per unit/ICU protocols Care for catheter as per unit/ICU protocols Verbal/written post procedure instructions were given to patient/caregiver/Care for catheter as per unit/ICU protocols Verbal/written post procedure instructions were given to patient/caregiver

## 2023-06-01 PROBLEM — D89.9 DISORDER INVOLVING THE IMMUNE MECHANISM, UNSPECIFIED (HCC): Status: RESOLVED | Noted: 2022-12-02 | Resolved: 2023-06-01

## 2023-06-01 PROBLEM — T86.10: Status: RESOLVED | Noted: 2022-01-11 | Resolved: 2023-06-01
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
## 2023-06-01 SDOH — ECONOMIC STABILITY: TRANSPORTATION INSECURITY
IN THE PAST 12 MONTHS, HAS LACK OF TRANSPORTATION KEPT YOU FROM MEETINGS, WORK, OR FROM GETTING THINGS NEEDED FOR DAILY LIVING?: NO

## 2023-06-01 SDOH — ECONOMIC STABILITY: INCOME INSECURITY: HOW HARD IS IT FOR YOU TO PAY FOR THE VERY BASICS LIKE FOOD, HOUSING, MEDICAL CARE, AND HEATING?: NOT HARD AT ALL

## 2023-06-01 SDOH — HEALTH STABILITY: PHYSICAL HEALTH: ON AVERAGE, HOW MANY DAYS PER WEEK DO YOU ENGAGE IN MODERATE TO STRENUOUS EXERCISE (LIKE A BRISK WALK)?: 7 DAYS

## 2023-06-01 SDOH — ECONOMIC STABILITY: FOOD INSECURITY: WITHIN THE PAST 12 MONTHS, YOU WORRIED THAT YOUR FOOD WOULD RUN OUT BEFORE YOU GOT MONEY TO BUY MORE.: NEVER TRUE

## 2023-06-01 SDOH — HEALTH STABILITY: PHYSICAL HEALTH: ON AVERAGE, HOW MANY MINUTES DO YOU ENGAGE IN EXERCISE AT THIS LEVEL?: 60 MIN

## 2023-06-01 SDOH — ECONOMIC STABILITY: HOUSING INSECURITY
IN THE LAST 12 MONTHS, WAS THERE A TIME WHEN YOU DID NOT HAVE A STEADY PLACE TO SLEEP OR SLEPT IN A SHELTER (INCLUDING NOW)?: NO

## 2023-06-01 SDOH — ECONOMIC STABILITY: FOOD INSECURITY: WITHIN THE PAST 12 MONTHS, THE FOOD YOU BOUGHT JUST DIDN'T LAST AND YOU DIDN'T HAVE MONEY TO GET MORE.: NEVER TRUE

## 2023-06-01 ASSESSMENT — PATIENT HEALTH QUESTIONNAIRE - PHQ9
1. LITTLE INTEREST OR PLEASURE IN DOING THINGS: 0
SUM OF ALL RESPONSES TO PHQ QUESTIONS 1-9: 0
SUM OF ALL RESPONSES TO PHQ QUESTIONS 1-9: 0
SUM OF ALL RESPONSES TO PHQ9 QUESTIONS 1 & 2: 0
SUM OF ALL RESPONSES TO PHQ QUESTIONS 1-9: 0
2. FEELING DOWN, DEPRESSED OR HOPELESS: 0
SUM OF ALL RESPONSES TO PHQ QUESTIONS 1-9: 0

## 2023-06-01 ASSESSMENT — LIFESTYLE VARIABLES
HOW OFTEN DO YOU HAVE A DRINK CONTAINING ALCOHOL: 1
HOW OFTEN DO YOU HAVE A DRINK CONTAINING ALCOHOL: NEVER
HOW MANY STANDARD DRINKS CONTAINING ALCOHOL DO YOU HAVE ON A TYPICAL DAY: 0
HOW OFTEN DO YOU HAVE SIX OR MORE DRINKS ON ONE OCCASION: 1
HOW MANY STANDARD DRINKS CONTAINING ALCOHOL DO YOU HAVE ON A TYPICAL DAY: PATIENT DOES NOT DRINK

## 2023-06-02 ENCOUNTER — OFFICE VISIT (OUTPATIENT)
Facility: CLINIC | Age: 78
End: 2023-06-02
Payer: MEDICARE

## 2023-06-02 VITALS
OXYGEN SATURATION: 96 % | HEIGHT: 72 IN | RESPIRATION RATE: 16 BRPM | SYSTOLIC BLOOD PRESSURE: 132 MMHG | BODY MASS INDEX: 26.09 KG/M2 | WEIGHT: 192.6 LBS | HEART RATE: 63 BPM | DIASTOLIC BLOOD PRESSURE: 64 MMHG | TEMPERATURE: 98.1 F

## 2023-06-02 DIAGNOSIS — D84.9 IMMUNOSUPPRESSION (HCC): ICD-10-CM

## 2023-06-02 DIAGNOSIS — N18.4 CHRONIC KIDNEY DISEASE, STAGE 4 (SEVERE) (HCC): ICD-10-CM

## 2023-06-02 DIAGNOSIS — I25.10 PLAQUE IN HEART ARTERY: ICD-10-CM

## 2023-06-02 DIAGNOSIS — Z94.0 HISTORY OF KIDNEY TRANSPLANT: ICD-10-CM

## 2023-06-02 DIAGNOSIS — D63.1 ANEMIA IN STAGE 4 CHRONIC KIDNEY DISEASE (HCC): ICD-10-CM

## 2023-06-02 DIAGNOSIS — Z00.00 ENCOUNTER FOR SUBSEQUENT ANNUAL WELLNESS VISIT (AWV) IN MEDICARE PATIENT: Primary | ICD-10-CM

## 2023-06-02 DIAGNOSIS — I10 ELEVATED BLOOD PRESSURE READING IN OFFICE WITH DIAGNOSIS OF HYPERTENSION: ICD-10-CM

## 2023-06-02 DIAGNOSIS — Z98.890 S/P BIOPSY: ICD-10-CM

## 2023-06-02 DIAGNOSIS — E55.9 VITAMIN D DEFICIENCY: ICD-10-CM

## 2023-06-02 DIAGNOSIS — N18.4 ANEMIA IN STAGE 4 CHRONIC KIDNEY DISEASE (HCC): ICD-10-CM

## 2023-06-02 DIAGNOSIS — D84.9 IMMUNODEFICIENCY, UNSPECIFIED (HCC): ICD-10-CM

## 2023-06-02 PROCEDURE — 3075F SYST BP GE 130 - 139MM HG: CPT | Performed by: PHYSICIAN ASSISTANT

## 2023-06-02 PROCEDURE — 1123F ACP DISCUSS/DSCN MKR DOCD: CPT | Performed by: PHYSICIAN ASSISTANT

## 2023-06-02 PROCEDURE — G0439 PPPS, SUBSEQ VISIT: HCPCS | Performed by: PHYSICIAN ASSISTANT

## 2023-06-02 PROCEDURE — 3078F DIAST BP <80 MM HG: CPT | Performed by: PHYSICIAN ASSISTANT

## 2023-06-02 NOTE — PROGRESS NOTES
[address]. Identified pt with two pt identifiers(name and ). Chief Complaint   Patient presents with    Medicare AWV     Room 4B //     Hypertension    Cholesterol Problem       Reviewed record In preparation for visit and have obtained necessary documentation. 1. Have you been to the ER, urgent care clinic or hospitalized since your last visit? No     2. Have you seen or consulted any other health care providers outside of the [address] [place] [address] since your last visit? Include any pap smears or colon screening. No    Patient has an advance directive. Vitals reviewed with provider. Health Maintenance reviewed:     Health Maintenance Due   Topic    Annual Wellness Visit (AWV)           Wt Readings from Last 3 Encounters:   23 192 lb 9.6 oz (87.4 kg)   22 197 lb (89.4 kg)   22 203 lb 9.6 oz (92.4 kg)        Temp Readings from Last 3 Encounters:   23 98.1 °F (36.7 °C) (Oral)        BP Readings from Last 3 Encounters:   23 (!) 152/70   22 128/60   22 138/84        Pulse Readings from Last 3 Encounters:   23 63   22 63   21 62      No flowsheet data found. Learning Assessment:   :     [address] LEARNING ASSESSMENT 2023   PRIMARY LEARNER Patient   PRIMARY LANGUAGE ENGLISH   LEARNER PREFERENCE PRIMARY OTHER (COMMENT)   ANSWERED BY Patient   RELATIONSHIP SELF         Fall Risk Assessment:     Julien Fall Risk Assessment and TUG Test 2023   Do you feel unsteady or are you worried about falling?  no - -   2 or more falls in past year? no - -   Fall with injury in past year? no - -   Fall in past 12 months? - 0 0   Able to walk? - Yes Yes         Abuse Screening: AMB Abuse Screening 2023   Do you ever feel afraid of your partner? N   Are you in a relationship with someone who physically or mentally threatens you? N   Is it safe for you to go home?  Y         ADL Screening:     ADL ASSESSMENT

## 2023-06-02 NOTE — PROGRESS NOTES
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 Labs done with Dr. Lashaun Hernandez in the spring, all normal for him. Will obtain PSA and lipids in fall if not completed by Nephrology at next visit with our lab opening soon. No new complaints. Continue good lipid management. BP at goal today with 130's/60s readings on manual      Recommendations for Preventive Services Due: see orders and patient instructions/AVS.  Recommended screening schedule for the next 5-10 years is provided to the patient in written form: see Patient Instructions/AVS.     Return in 6 months (on 12/2/2023). Subjective   The following acute and/or chronic problems were also addressed today:    CKD sp renal transplant. Labs have been stable. Has anemia of chronic disease. Hgb runs around 11. Has good energy. Continues to take prednisone 5 mg daily, tracrolimus 2 caps in AM    HTN: has reasonable control. Goal is 421-878 systolic. Normally runs around 140. Has good days and bad days. 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 Going back next week for suture removal.    Patient's complete Health Risk Assessment and screening values have been reviewed and are found in Flowsheets.  The following problems were reviewed today and where indicated follow up appointments were made and/or referrals

## 2023-10-11 ENCOUNTER — HOSPITAL ENCOUNTER (OUTPATIENT)
Facility: HOSPITAL | Age: 78
Setting detail: SPECIMEN
Discharge: HOME | End: 2023-10-14

## 2024-02-13 ENCOUNTER — OFFICE VISIT (OUTPATIENT)
Facility: CLINIC | Age: 79
End: 2024-02-13
Payer: MEDICARE

## 2024-02-13 VITALS
OXYGEN SATURATION: 96 % | RESPIRATION RATE: 18 BRPM | HEART RATE: 75 BPM | DIASTOLIC BLOOD PRESSURE: 70 MMHG | TEMPERATURE: 98.8 F | SYSTOLIC BLOOD PRESSURE: 130 MMHG

## 2024-02-13 DIAGNOSIS — N18.9 ANEMIA DUE TO CHRONIC KIDNEY DISEASE, UNSPECIFIED CKD STAGE: ICD-10-CM

## 2024-02-13 DIAGNOSIS — E11.9 TYPE 2 DIABETES MELLITUS WITHOUT COMPLICATION, WITHOUT LONG-TERM CURRENT USE OF INSULIN (HCC): ICD-10-CM

## 2024-02-13 DIAGNOSIS — M62.81 MUSCLE WEAKNESS: ICD-10-CM

## 2024-02-13 DIAGNOSIS — Z94.0 HISTORY OF KIDNEY TRANSPLANT: ICD-10-CM

## 2024-02-13 DIAGNOSIS — Z48.810: Primary | ICD-10-CM

## 2024-02-13 DIAGNOSIS — I10 PRIMARY HYPERTENSION: ICD-10-CM

## 2024-02-13 DIAGNOSIS — U07.1 COVID-19: ICD-10-CM

## 2024-02-13 DIAGNOSIS — D63.1 ANEMIA DUE TO CHRONIC KIDNEY DISEASE, UNSPECIFIED CKD STAGE: ICD-10-CM

## 2024-02-13 PROBLEM — R31.0 GROSS HEMATURIA: Status: RESOLVED | Noted: 2022-12-02 | Resolved: 2024-02-13

## 2024-02-13 PROBLEM — Z98.890 S/P BIOPSY: Status: RESOLVED | Noted: 2017-07-18 | Resolved: 2024-02-13

## 2024-02-13 PROCEDURE — 99309 SBSQ NF CARE MODERATE MDM 30: CPT | Performed by: NURSE PRACTITIONER

## 2024-02-13 PROCEDURE — 1123F ACP DISCUSS/DSCN MKR DOCD: CPT | Performed by: NURSE PRACTITIONER

## 2024-02-13 PROCEDURE — G8484 FLU IMMUNIZE NO ADMIN: HCPCS | Performed by: NURSE PRACTITIONER

## 2024-02-13 NOTE — PROGRESS NOTES
PLACE OF SERVICE:  [facility] [address] [place] [address]    SKILLED VISIT    2/13/2024    Chief Complaint:    Chief Complaint   Patient presents with    Follow-up       HPI : Poncho Driver Jr. is a 78 y.o. 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 Postoperatively he required 1 unit packed RBC for anemia and hemoglobin 6.7. patient also seen by transplant nephrology. Creatinine was 1.8 around his baseline. Patient was discharged to [facility] rehab for acute rehab services due to dibility and weakness.  Patient was then admitted here for ongoing rehab needs due to debility weakness unsteady gait and decreased endurance.  On admission patient found to be positive for COVID 19.    Patient seen today for skilled follow up. On exam, patient is laying in bed. He is awake and alert. Patient denies any pain to the right side of his face.  On exam, wound bed is covered with dry brown crust. Surround skin and wound edges pink, no erythema. Had him examined by wound care physician in facility who recommended covering wound bed with xerofrom. Spoke to Dr. Bharat Sharma, he is okay with covering the wound with Xeroform.  He will follow-up with patient on 2/22/2024 for wound check.  Patient is ambulating with use of walker.  Continue PT OT services.  Patient also seen for COVID-19 follow-up.  Patient with slight cough.  Start Mucinex.  No respiratory distress.  On room air.  Denies nausea and vomiting.  Denies diarrhea denies chest pain or shortness of breath denies headache BMP and CBC ordered encourage p.o. fluid intake.  Patient states his appetite is \"so-so\".        PMH:   Squamous cell carcinoma of the right face status post right auriculectomy status post right parotidectomy  History of renal cell

## 2024-02-15 ENCOUNTER — OFFICE VISIT (OUTPATIENT)
Facility: CLINIC | Age: 79
End: 2024-02-15
Payer: MEDICARE

## 2024-02-15 DIAGNOSIS — Z48.810: Primary | ICD-10-CM

## 2024-02-15 DIAGNOSIS — D84.9 IMMUNOSUPPRESSION (HCC): ICD-10-CM

## 2024-02-15 DIAGNOSIS — N18.4 CHRONIC KIDNEY DISEASE, STAGE 4 (SEVERE) (HCC): ICD-10-CM

## 2024-02-15 PROCEDURE — 99309 SBSQ NF CARE MODERATE MDM 30: CPT | Performed by: INTERNAL MEDICINE

## 2024-02-15 PROCEDURE — G8484 FLU IMMUNIZE NO ADMIN: HCPCS | Performed by: INTERNAL MEDICINE

## 2024-02-15 PROCEDURE — 1123F ACP DISCUSS/DSCN MKR DOCD: CPT | Performed by: INTERNAL MEDICINE

## 2024-02-15 NOTE — PROGRESS NOTES
PLACE OF SERVICE:  [facility] [place] [address]    SKILLED VISIT    2/15/2024    Chief Complaint:    Squamous cell carcinoma of the head and neck SP resection  Debility and weakness  COVID-19      HPI : Poncho Driver Jr. is a 78 y.o. 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 Postoperatively he required 1 unit packed RBC for anemia and hemoglobin 6.7. patient also seen by transplant nephrology. Creatinine was 1.8 around his baseline. Patient was discharged to [facility] rehab for acute rehab services due to dibility and weakness.  Patient was then admitted here for ongoing rehab needs due to debility weakness unsteady gait and decreased endurance.  On admission patient found to be positive for COVID 19.     examined by wound care physician in facility who recommended covering wound bed with xerofrom. Spoke to Dr. Bharat Sharma, he is okay with covering the wound with Xeroform.  He will follow-up with patient on 2/22/2024 for wound check.      2/15/24 : Patient is seen for skilled visit follow-up.  Patient is in contact droplet isolation denies fever no chills no chest pain he is afebrile not hypoxic.  Labs from yesterday reviewed white count 6.9 hemoglobin 10 creatinine 1.78 he has history of CKD stage III around his baseline.      PMH:   Squamous cell carcinoma of the right face status post right auriculectomy status post right parotidectomy  History of renal cell transplant  History of hep C  Neuropathic pain     Medications: Reviewed in EMR and assessment and plan    Social History / Family History:       reports that he has never smoked. He has never used smokeless tobacco. He reports that he does not drink alcohol and does not use drugs.    Family History   Problem Relation Age of Onset    Lung

## 2024-02-21 ENCOUNTER — HOME HEALTH ADMISSION (OUTPATIENT)
Dept: HOME HEALTH SERVICES | Facility: HOME HEALTH | Age: 79
End: 2024-02-21
Payer: MEDICARE

## 2024-02-21 ENCOUNTER — OFFICE VISIT (OUTPATIENT)
Facility: CLINIC | Age: 79
End: 2024-02-21
Payer: MEDICARE

## 2024-02-21 VITALS
BODY MASS INDEX: 23.76 KG/M2 | SYSTOLIC BLOOD PRESSURE: 109 MMHG | DIASTOLIC BLOOD PRESSURE: 63 MMHG | WEIGHT: 175.2 LBS | RESPIRATION RATE: 18 BRPM | OXYGEN SATURATION: 98 % | TEMPERATURE: 98.1 F | HEART RATE: 80 BPM

## 2024-02-21 DIAGNOSIS — M62.81 MUSCLE WEAKNESS: ICD-10-CM

## 2024-02-21 DIAGNOSIS — D63.1 ANEMIA DUE TO CHRONIC KIDNEY DISEASE, UNSPECIFIED CKD STAGE: ICD-10-CM

## 2024-02-21 DIAGNOSIS — Z48.810: Primary | ICD-10-CM

## 2024-02-21 DIAGNOSIS — E11.9 TYPE 2 DIABETES MELLITUS WITHOUT COMPLICATION, WITHOUT LONG-TERM CURRENT USE OF INSULIN (HCC): ICD-10-CM

## 2024-02-21 DIAGNOSIS — I10 PRIMARY HYPERTENSION: ICD-10-CM

## 2024-02-21 DIAGNOSIS — U07.1 COVID-19: ICD-10-CM

## 2024-02-21 DIAGNOSIS — N18.9 ANEMIA DUE TO CHRONIC KIDNEY DISEASE, UNSPECIFIED CKD STAGE: ICD-10-CM

## 2024-02-21 DIAGNOSIS — Z94.0 HISTORY OF KIDNEY TRANSPLANT: ICD-10-CM

## 2024-02-21 PROCEDURE — 99316 NF DSCHRG MGMT 30 MIN+: CPT | Performed by: NURSE PRACTITIONER

## 2024-02-21 PROCEDURE — G8484 FLU IMMUNIZE NO ADMIN: HCPCS | Performed by: NURSE PRACTITIONER

## 2024-02-21 NOTE — PROGRESS NOTES
Place of Service:  [facility] [address] [place] [address]                                                                     Discharge Summary       Admit Dx:   squamous cell carcinoma of the right face  Debility and weakness    Disposition: Home and Home Health Services    Presentation: Poncho Driver Jr. is a 78 y.o. 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 Postoperatively he required 1 unit packed RBC for anemia and hemoglobin 6.7. patient also seen by transplant nephrology. Creatinine was 1.8 around his baseline. Patient was discharged to [facility] rehab for acute rehab services due to dibility and weakness.  Patient was then admitted here for ongoing rehab needs due to debility weakness unsteady gait and decreased endurance.  On admission patient found to be positive for COVID 19.    Brief SNF Course: Patient participated with PT OT rehabilitation services.  Patient will be discharged to home with home health services.  On admission to facility patient was diagnosed with COVID-19.  He was treated conservatively with Mucinex.  Blood work was stable.  Did not require supplemental oxygen.  Patient has completed isolation and will be discharged home with home health services.  He is able to ambulate with use of rolling walker.  Patient has ENT follow-up tomorrow 2/22/2024.  Patient given wound care supplies to get him to his appointment tomorrow.  Follow-up with PCP.  Discharge instructions and prescriptions given at time of discharge.  On exam patient is medically stable.  He denies chest pain short of breath nausea vomiting diarrhea.  Vital signs reviewed and patient with soft BP.  Coreg discontinued on discharge with need for blood pressure follow-up with PCP as an outpatient.    Last labs: White count 6.9

## 2024-02-23 ENCOUNTER — HOME CARE VISIT (OUTPATIENT)
Facility: HOME HEALTH | Age: 79
End: 2024-02-23

## 2024-02-23 ENCOUNTER — HOME CARE VISIT (OUTPATIENT)
Dept: HOME HEALTH SERVICES | Facility: HOME HEALTH | Age: 79
End: 2024-02-23

## 2024-02-23 VITALS
HEART RATE: 70 BPM | SYSTOLIC BLOOD PRESSURE: 126 MMHG | RESPIRATION RATE: 18 BRPM | DIASTOLIC BLOOD PRESSURE: 64 MMHG | OXYGEN SATURATION: 99 % | TEMPERATURE: 97.1 F

## 2024-02-23 PROCEDURE — 0221000100 HH NO PAY CLAIM PROCEDURE

## 2024-02-23 PROCEDURE — G0299 HHS/HOSPICE OF RN EA 15 MIN: HCPCS

## 2024-02-23 ASSESSMENT — ENCOUNTER SYMPTOMS
STOOL DESCRIPTION: FORMED
DYSPNEA ACTIVITY LEVEL: AFTER AMBULATING 10 - 20 FT

## 2024-02-23 NOTE — HOME HEALTH
Reason for referral, PMH SUMMARY of clinical condition: squamous cell cancer of R ear, covid admitted to home care for SN. Nursing needed for wound care, med management, cp assessment and teaching..     Clinical Assessment/Skilled reason for admission to home health (What this means for the patient overall and need for ongoing skilled care):Patient will continue to benefit from skilled nursing services for wound care, med management, cp assessment and teaching.  Unable to use [employer]endy in home to upload wound photos, emergency plan and med list due to internet coverage in area of home.    Diagnosis: covid, squamous cell cancer of R ear sp removal    Subjective (statement from pt/cg that is relative to why you are there): patient reports difficulty keeping dressing intact due to location    Caregiver: spouse.  Caregiver assists with: Medications Caregiver unable to assist with: Meals, Bathing, ADL, IADL, Wound care, Transportation and Housekeeping. Caregiver is available 24 hours/day Caregiver is  present at this visit and did participate with clinician.    Medications reconciled and all medications are available in the home this visit. The following education was provided regarding medications: medication interactions and look alike medications: na      A list of reconciled medications has been given to the patient/caregiver .    Patient at risk for falls Yes:   Recommended requesting PT/OT orders due to fall risk YES: home cluttered, requires ad, unsteady gait, [place]  Patient response to recommended requesting of PT/OT orders: agrees    Interdisciplinary communication with:  Physician for the purpose of admit notification    Written Teaching Material Utilized: admit packet, med list    Clinician reviewed orientation to home health booklet with patient/caregiver including agency phone number, agency complaint process, state hotline number, as well as Joint Commission's quality hotline number. Emergency preparedness

## 2024-02-26 ENCOUNTER — HOME CARE VISIT (OUTPATIENT)
Facility: HOME HEALTH | Age: 79
End: 2024-02-26
Payer: MEDICARE

## 2024-02-26 PROCEDURE — G0300 HHS/HOSPICE OF LPN EA 15 MIN: HCPCS

## 2024-02-27 ENCOUNTER — HOME CARE VISIT (OUTPATIENT)
Facility: HOME HEALTH | Age: 79
End: 2024-02-27

## 2024-02-27 PROCEDURE — G0152 HHCP-SERV OF OT,EA 15 MIN: HCPCS

## 2024-02-28 ENCOUNTER — HOME CARE VISIT (OUTPATIENT)
Facility: HOME HEALTH | Age: 79
End: 2024-02-28

## 2024-02-28 VITALS
OXYGEN SATURATION: 98 % | SYSTOLIC BLOOD PRESSURE: 100 MMHG | DIASTOLIC BLOOD PRESSURE: 64 MMHG | HEART RATE: 64 BPM | TEMPERATURE: 97.6 F | RESPIRATION RATE: 16 BRPM

## 2024-02-28 VITALS
WEIGHT: 167 LBS | TEMPERATURE: 98.4 F | DIASTOLIC BLOOD PRESSURE: 60 MMHG | BODY MASS INDEX: 22.65 KG/M2 | HEART RATE: 61 BPM | SYSTOLIC BLOOD PRESSURE: 110 MMHG | OXYGEN SATURATION: 99 % | RESPIRATION RATE: 16 BRPM

## 2024-02-28 VITALS
DIASTOLIC BLOOD PRESSURE: 60 MMHG | OXYGEN SATURATION: 99 % | HEART RATE: 68 BPM | RESPIRATION RATE: 18 BRPM | SYSTOLIC BLOOD PRESSURE: 110 MMHG

## 2024-02-28 PROCEDURE — G0151 HHCP-SERV OF PT,EA 15 MIN: HCPCS

## 2024-02-28 PROCEDURE — G0299 HHS/HOSPICE OF RN EA 15 MIN: HCPCS

## 2024-02-28 NOTE — HOME HEALTH
list      Patient/caregiver instructed on plan of care and are agreeable to plan of care at this time.        Discharge planning discussed with patient and caregiver. Discharge planning as follows: When goals are met Patient/caregiver did verbalize agreement with discharge planning.

## 2024-02-28 NOTE — HOME HEALTH
77 yo male admitted to home care secondary to squamous cell cancer of R ear and covid which pt denies having symptoms. PMH: kidney transplant, HTN, anemia, chronic viral hep C, DM2, HLD. He lives in a one story home with his wife and there are 3 steps with railing and a ramp. Prior to surgery he was independent without AD, driving, doing all meal prep and laundry. Pt states his wife has mobility deficits.  Pt reports using a cane outside of his home.  patient is independent for safe ADL completion (Modified Barthel Index = 95/100) during OT Eval. Pt dg pain and  continues to walk without AD and is completing ADLs and IADLS . reports showering yesterday without difficulty. Pt has a walk in shower with grab bars and a shower chair. At this time, no additional skilled OT is warranted.   .  Subjective:I dont have pain but he really did a number on my face.   Falls since last visit No(if yes complete the Fall Tracking Form and include bsrifallreport):   Caregiver involvement changes: wife is able to assist with basic care.  Home health supplies by type and quantity ordered/delivered this visit include: NA    Clinician asked if patient has had any physician contact since last home care visit and patient states: NO  Clinician asked if patient has any new or changed medications and patient states:  NO   If Yes, were medications reconciled? NO   Was the certifying physician notified of changes in medications? NO     Written Teaching Material Utilized: reinforced home health booklet.    Interdisciplinary communication with: BRAULIO Lee for the purpose of POC collaboration    Discharge planning as follows: Will discharge when the patient has reached their maximum functional potential and maximum safety in their home and When goals are met    Specific plan for next visit: Re-instruct patient/caregiver on ADL training,home safety.  .  Modified Barthel:   Feeding 10  Bathing 5  Grooming 5  Dressing 10  Bowels 10  Bladder

## 2024-02-29 NOTE — HOME HEALTH
Subjective:Patient denies pain at this visit.  Falls since last visit No(if yes complete the Fall Tracking Form and include bsrifallreport):   Caregiver involvement changes: no  Home health supplies by type and quantity ordered/delivered this visit include: no    Clinician asked if patient has had any physician contact since last home care visit and patient states: N/A  Clinician asked if patient has any new or changed medications and patient states:  N/A   If Yes, were medications reconciled? N/A   Was the certifying physician notified of changes in medications? N/A     Clinical assessment (what this visit means for the patient overall and need for ongoing skilled care) and progress or lack of progress towards SPECIFIC goals: Patients wound care is complete. Patients wound has a lot of slough and drainage. SN needed for further eval and treatment.    Written Teaching Material Utilized: N/A    Interdisciplinary communication with: N/A     Discharge planning as follows: When goals are met    Specific plan for next visit: Instruct in safety issues regarding Diet

## 2024-03-01 ENCOUNTER — HOME CARE VISIT (OUTPATIENT)
Facility: HOME HEALTH | Age: 79
End: 2024-03-01

## 2024-03-01 VITALS
RESPIRATION RATE: 16 BRPM | SYSTOLIC BLOOD PRESSURE: 140 MMHG | OXYGEN SATURATION: 98 % | TEMPERATURE: 98.2 F | HEART RATE: 69 BPM | DIASTOLIC BLOOD PRESSURE: 72 MMHG

## 2024-03-01 PROCEDURE — G0299 HHS/HOSPICE OF RN EA 15 MIN: HCPCS

## 2024-03-01 NOTE — HOME HEALTH
Subjective: Patient denies pain today  Falls since last visit No(if yes complete the Fall Tracking Form and include bsrifallreport):   Caregiver involvement changes: no  Home health supplies by type and quantity ordered/delivered this visit include: no    Clinician asked if patient has had any physician contact since last home care visit and patient states: N/A  Clinician asked if patient has any new or changed medications and patient states:  N/A   If Yes, were medications reconciled? N/A   Was the certifying physician notified of changes in medications? N/A     Clinical assessment (what this visit means for the patient overall and need for ongoing skilled care) and progress or lack of progress towards SPECIFIC goals: Patients wound care is complete. I washed patients hair today because he said he hasnt had it washed in over 30days. SN needed for further eval and treatment.    Written Teaching Material Utilized: N/A    Interdisciplinary communication with: N/A     Discharge planning as follows: When goals are met    Specific plan for next visit: Educate in s/s of infection and when to call MD DANIELLE or [phone]

## 2024-03-04 ENCOUNTER — HOME CARE VISIT (OUTPATIENT)
Facility: HOME HEALTH | Age: 79
End: 2024-03-04
Payer: MEDICARE

## 2024-03-04 VITALS
RESPIRATION RATE: 16 BRPM | DIASTOLIC BLOOD PRESSURE: 58 MMHG | HEART RATE: 62 BPM | SYSTOLIC BLOOD PRESSURE: 106 MMHG | OXYGEN SATURATION: 98 % | TEMPERATURE: 98.5 F

## 2024-03-04 VITALS
TEMPERATURE: 96.9 F | OXYGEN SATURATION: 94 % | RESPIRATION RATE: 18 BRPM | SYSTOLIC BLOOD PRESSURE: 127 MMHG | DIASTOLIC BLOOD PRESSURE: 84 MMHG | HEART RATE: 69 BPM

## 2024-03-04 PROCEDURE — G0299 HHS/HOSPICE OF RN EA 15 MIN: HCPCS

## 2024-03-04 ASSESSMENT — ENCOUNTER SYMPTOMS: HEMOPTYSIS: 0

## 2024-03-04 NOTE — HOME HEALTH
Subjective: Pt had no complaints or concerns at this time   Falls since last visit NO(if yes complete the Fall Tracking Form and include bsrifallreport):   Caregiver involvement changes: Wife is primary caregiver   Home health supplies by type and quantity ordered/delivered this visit include: none     Clinician asked if patient has had any physician contact since last home care visit and patient states: YES  Clinician asked if patient has any new or changed medications and patient states:  NO  If Yes, were medications reconciled? NO  Was the certifying physician notified of changes in medications? NO    Clinical assessment (what this visit means for the patient overall and need for ongoing skilled care) and progress or lack of progress towards SPECIFIC goals: Wound care, medication and diagnosis education     Written Teaching Material Utilized: JIM    Interdisciplinary communication with:JIM for the purpose of NA    Discharge planning as follows:When wound is 100% healed     Specific plan for next visit: Wound care

## 2024-03-05 NOTE — HOME HEALTH
Subjective: Patient states he is not having any pain right now  Falls since last visit No(if yes complete the Fall Tracking Form and include bsrifallreport):   Caregiver involvement changes: no  Home health supplies by type and quantity ordered/delivered this visit include: no    Clinician asked if patient has had any physician contact since last home care visit and patient states: N/A  Clinician asked if patient has any new or changed medications and patient states:  N/A   If Yes, were medications reconciled? N/A   Was the certifying physician notified of changes in medications? N/A     Clinical assessment (what this visit means for the patient overall and need for ongoing skilled care) and progress or lack of progress towards SPECIFIC goals: Patients wound care is complete. Reported pt wound to Dr Sharma today due to deterioration of wound. SN needed for further eval and treatment.    Written Teaching Material Utilized: N/A    Interdisciplinary communication with: N/A     Discharge planning as follows: When goals are met    Specific plan for next visit: Educate in s/s of infection and when to call MD DANIELLE or [phone]

## 2024-03-06 ENCOUNTER — HOME CARE VISIT (OUTPATIENT)
Facility: HOME HEALTH | Age: 79
End: 2024-03-06
Payer: MEDICARE

## 2024-03-06 PROCEDURE — G0300 HHS/HOSPICE OF LPN EA 15 MIN: HCPCS

## 2024-03-08 ENCOUNTER — HOME CARE VISIT (OUTPATIENT)
Facility: HOME HEALTH | Age: 79
End: 2024-03-08
Payer: MEDICARE

## 2024-03-08 PROCEDURE — G0300 HHS/HOSPICE OF LPN EA 15 MIN: HCPCS

## 2024-03-10 VITALS
OXYGEN SATURATION: 100 % | HEART RATE: 58 BPM | TEMPERATURE: 99.1 F | SYSTOLIC BLOOD PRESSURE: 130 MMHG | DIASTOLIC BLOOD PRESSURE: 70 MMHG | RESPIRATION RATE: 18 BRPM

## 2024-03-10 NOTE — HOME HEALTH
Subjective: Wife states pt will be getting his hair cut and washed this afternoon   Falls since last visit No(if yes complete the Fall Tracking Form and include bsrifallreport):   Caregiver involvement changes: Wife is primary caregiver   Home health supplies by type and quantity ordered/delivered this visit include: none     Clinician asked if patient has had any physician contact since last home care visit and patient states: NO  Clinician asked if patient has any new or changed medications and patient states:  NO   If Yes, were medications reconciled? NO   Was the certifying physician notified of changes in medications? NO     Clinical assessment (what this visit means for the patient overall and need for ongoing skilled care) and progress or lack of progress towards SPECIFIC goals: Pt continues to require SN for wound care, medication and diagnosis education. Wife stated to SN that pt will be going to salon and getting hair washed and cut later today and that she will do wound care once that is done. SN has concerns about water in bed and ear canal as there is not an ear to protect the canal. Wife is adament that  works with people with special needs and that his right ear will be protected. Pt and wife have been educated on s/s of infection and were able to repeat back with 75% accuracy.     Written Teaching Material Utilized: N/A    Interdisciplinary communication with: N/A for the purpose of NA     Discharge planning as follows: When wound is 100% healed    Specific plan for next visit: Wound care

## 2024-03-10 NOTE — HOME HEALTH
Subjective: Pt had no complaints or discomfort voiced   Falls since last visit No(if yes complete the Fall Tracking Form and include bsrifallreport):   Caregiver involvement changes: Wife is primary caregiver   Home health supplies by type and quantity ordered/delivered this visit include: none     Clinician asked if patient has had any physician contact since last home care visit and patient states: NO  Clinician asked if patient has any new or changed medications and patient states:  NO   If Yes, were medications reconciled? NO   Was the certifying physician notified of changes in medications? NO     Clinical assessment (what this visit means for the patient overall and need for ongoing skilled care) and progress or lack of progress towards SPECIFIC goals: Pt continues to require SN for wound care     Written Teaching Material Utilized: N/A    Interdisciplinary communication with: N/A for the purpose of NA     Discharge planning as follows: When wound is 100% healed    Specific plan for next visit: Wound care

## 2024-03-11 ENCOUNTER — HOME CARE VISIT (OUTPATIENT)
Facility: HOME HEALTH | Age: 79
End: 2024-03-11
Payer: MEDICARE

## 2024-03-11 VITALS
SYSTOLIC BLOOD PRESSURE: 128 MMHG | OXYGEN SATURATION: 99 % | TEMPERATURE: 98.6 F | HEART RATE: 66 BPM | DIASTOLIC BLOOD PRESSURE: 66 MMHG | RESPIRATION RATE: 16 BRPM

## 2024-03-11 PROCEDURE — G0299 HHS/HOSPICE OF RN EA 15 MIN: HCPCS

## 2024-03-11 NOTE — HOME HEALTH
Subjective:Patient denies pain today  Falls since last visit No(if yes complete the Fall Tracking Form and include bsrifallreport):   Caregiver involvement changes: no  Home health supplies by type and quantity ordered/delivered this visit include: no    Clinician asked if patient has had any physician contact since last home care visit and patient states: N/A  Clinician asked if patient has any new or changed medications and patient states:  N/A   If Yes, were medications reconciled? N/A   Was the certifying physician notified of changes in medications? N/A     Clinical assessment (what this visit means for the patient overall and need for ongoing skilled care) and progress or lack of progress towards SPECIFIC goals: Patients wound care is complete. Patient has an appt with Dr HERNANDEZ tomorrow morning. SN needed for further eval and treatment.     Written Teaching Material Utilized: N/A    Interdisciplinary communication with: N/A     Discharge planning as follows: Will discharge when the patient has reached their maximum functional potential and maximum safety in their home    Specific plan for next visit: Educate in s/s of infection and when to call MD DANIELLE or [phone]

## 2024-03-13 ENCOUNTER — OFFICE VISIT (OUTPATIENT)
Facility: CLINIC | Age: 79
End: 2024-03-13
Payer: MEDICARE

## 2024-03-13 ENCOUNTER — HOME CARE VISIT (OUTPATIENT)
Facility: HOME HEALTH | Age: 79
End: 2024-03-13
Payer: MEDICARE

## 2024-03-13 VITALS
BODY MASS INDEX: 22.67 KG/M2 | OXYGEN SATURATION: 98 % | RESPIRATION RATE: 16 BRPM | HEART RATE: 65 BPM | DIASTOLIC BLOOD PRESSURE: 65 MMHG | WEIGHT: 167.4 LBS | HEIGHT: 72 IN | SYSTOLIC BLOOD PRESSURE: 121 MMHG | TEMPERATURE: 97.4 F

## 2024-03-13 DIAGNOSIS — N18.32 STAGE 3B CHRONIC KIDNEY DISEASE (HCC): ICD-10-CM

## 2024-03-13 DIAGNOSIS — E11.9 TYPE 2 DIABETES MELLITUS WITHOUT COMPLICATION, WITHOUT LONG-TERM CURRENT USE OF INSULIN (HCC): ICD-10-CM

## 2024-03-13 DIAGNOSIS — Z09 HOSPITAL DISCHARGE FOLLOW-UP: ICD-10-CM

## 2024-03-13 DIAGNOSIS — C44.201 MALIGNANT NEOPLASM OF SKIN OF EAR AND EXTERNAL AUDITORY CANAL: ICD-10-CM

## 2024-03-13 DIAGNOSIS — I10 PRIMARY HYPERTENSION: Primary | ICD-10-CM

## 2024-03-13 PROCEDURE — G8484 FLU IMMUNIZE NO ADMIN: HCPCS | Performed by: PHYSICIAN ASSISTANT

## 2024-03-13 PROCEDURE — 3074F SYST BP LT 130 MM HG: CPT | Performed by: PHYSICIAN ASSISTANT

## 2024-03-13 PROCEDURE — 3078F DIAST BP <80 MM HG: CPT | Performed by: PHYSICIAN ASSISTANT

## 2024-03-13 PROCEDURE — 99213 OFFICE O/P EST LOW 20 MIN: CPT | Performed by: PHYSICIAN ASSISTANT

## 2024-03-13 PROCEDURE — 1111F DSCHRG MED/CURRENT MED MERGE: CPT | Performed by: PHYSICIAN ASSISTANT

## 2024-03-13 PROCEDURE — 1123F ACP DISCUSS/DSCN MKR DOCD: CPT | Performed by: PHYSICIAN ASSISTANT

## 2024-03-13 PROCEDURE — 1036F TOBACCO NON-USER: CPT | Performed by: PHYSICIAN ASSISTANT

## 2024-03-13 PROCEDURE — G8427 DOCREV CUR MEDS BY ELIG CLIN: HCPCS | Performed by: PHYSICIAN ASSISTANT

## 2024-03-13 PROCEDURE — G8420 CALC BMI NORM PARAMETERS: HCPCS | Performed by: PHYSICIAN ASSISTANT

## 2024-03-13 ASSESSMENT — PATIENT HEALTH QUESTIONNAIRE - PHQ9
SUM OF ALL RESPONSES TO PHQ9 QUESTIONS 1 & 2: 0
SUM OF ALL RESPONSES TO PHQ QUESTIONS 1-9: 0
2. FEELING DOWN, DEPRESSED OR HOPELESS: 0
SUM OF ALL RESPONSES TO PHQ QUESTIONS 1-9: 0
1. LITTLE INTEREST OR PLEASURE IN DOING THINGS: 0

## 2024-03-13 NOTE — PROGRESS NOTES
Screening   Do you ever feel afraid of your partner? N   Are you in a relationship with someone who physically or mentally threatens you? N   Is it safe for you to go home? Y         ADL Screenin/2/2023    12:00 PM   ADL ASSESSMENT   Feeding yourself No Help Needed   Getting from bed to chair No Help Needed   Getting dressed No Help Needed   Bathing or showering No Help Needed   Walk across the room (includes cane/walker) No Help Needed   Using the telphone No Help Needed   Taking your medications No Help Needed   Preparing meals No Help Needed   Managing money (expenses/bills) No Help Needed   Moderately strenuous housework (laundry) No Help Needed   Shopping for personal items (toiletries/medicines) No Help Needed   Shopping for groceries No Help Needed   Driving No Help Needed   Climbing a flight of stairs No Help Needed   Getting to places beyond walking distances No Help Needed      
range of motion and neck supple. No rigidity.      Right lower leg: No edema.      Left lower leg: No edema.   Lymphadenopathy:      Cervical: No cervical adenopathy.   Skin:     General: Skin is warm and dry.      Capillary Refill: Capillary refill takes 2 to 3 seconds.      Findings: No rash.   Neurological:      General: No focal deficit present.      Mental Status: He is alert and oriented to person, place, and time. Mental status is at baseline.      Sensory: No sensory deficit.      Motor: No weakness.      Gait: Gait normal.   Psychiatric:         Mood and Affect: Mood normal.         Behavior: Behavior normal.         Thought Content: Thought content normal.     An electronic signature was used to authenticate this note.  --Nadir Du PA-C

## 2024-03-15 ENCOUNTER — HOME CARE VISIT (OUTPATIENT)
Facility: HOME HEALTH | Age: 79
End: 2024-03-15
Payer: MEDICARE

## 2024-03-15 VITALS
RESPIRATION RATE: 16 BRPM | HEART RATE: 63 BPM | WEIGHT: 165 LBS | BODY MASS INDEX: 22.38 KG/M2 | TEMPERATURE: 98.1 F | DIASTOLIC BLOOD PRESSURE: 60 MMHG | SYSTOLIC BLOOD PRESSURE: 128 MMHG | OXYGEN SATURATION: 100 %

## 2024-03-15 PROCEDURE — G0299 HHS/HOSPICE OF RN EA 15 MIN: HCPCS

## 2024-03-15 NOTE — HOME HEALTH
Subjective: Patient denies pain today  Falls since last visit No(if yes complete the Fall Tracking Form and include bsrifallreport):   Caregiver involvement changes: no  Home health supplies by type and quantity ordered/delivered this visit include: no    Clinician asked if patient has had any physician contact since last home care visit and patient states: N/A  Clinician asked if patient has any new or changed medications and patient states:  N/A   If Yes, were medications reconciled? N/A   Was the certifying physician notified of changes in medications? N/A     Clinical assessment (what this visit means for the patient overall and need for ongoing skilled care) and progress or lack of progress towards SPECIFIC goals: Patients wound look much better, doctor debrided it a bit. No more black eschar. Patient has a ton of drainage that is thick and yellow without smell. SN needed for further eval and treatment.    Written Teaching Material Utilized: N/A    Interdisciplinary communication with: N/A     Discharge planning as follows: When goals are met    Specific plan for next visit: Instruct in safety issues regarding Diet

## 2024-03-17 ENCOUNTER — HOME CARE VISIT (OUTPATIENT)
Dept: HOME HEALTH SERVICES | Facility: HOME HEALTH | Age: 79
End: 2024-03-17
Payer: MEDICARE

## 2024-03-18 ENCOUNTER — HOME CARE VISIT (OUTPATIENT)
Facility: HOME HEALTH | Age: 79
End: 2024-03-18
Payer: MEDICARE

## 2024-03-18 PROCEDURE — G0300 HHS/HOSPICE OF LPN EA 15 MIN: HCPCS

## 2024-03-22 ENCOUNTER — HOME CARE VISIT (OUTPATIENT)
Facility: HOME HEALTH | Age: 79
End: 2024-03-22
Payer: MEDICARE

## 2024-03-22 VITALS
DIASTOLIC BLOOD PRESSURE: 66 MMHG | OXYGEN SATURATION: 99 % | RESPIRATION RATE: 16 BRPM | HEART RATE: 61 BPM | TEMPERATURE: 98.3 F | SYSTOLIC BLOOD PRESSURE: 128 MMHG

## 2024-03-22 PROCEDURE — G0299 HHS/HOSPICE OF RN EA 15 MIN: HCPCS

## 2024-03-22 NOTE — HOME HEALTH
Subjective: Patient denies pain today  Falls since last visit No(if yes complete the Fall Tracking Form and include bsrifallreport):   Caregiver involvement changes: no  Home health supplies by type and quantity ordered/delivered this visit include: no    Clinician asked if patient has had any physician contact since last home care visit and patient states: N/A  Clinician asked if patient has any new or changed medications and patient states:  N/A   If Yes, were medications reconciled? N/A   Was the certifying physician notified of changes in medications? N/A     Clinical assessment (what this visit means for the patient overall and need for ongoing skilled care) and progress or lack of progress towards SPECIFIC goals: Patient denies pain with wound care. Wound care is complete.Wound is healing and showing good signs of perfusion. SN needed for further eval and treatment    Written Teaching Material Utilized: N/A    Interdisciplinary communication with: N/A     Discharge planning as follows: When goals are met    Specific plan for next visit: Educate on home medications

## 2024-03-25 ASSESSMENT — ENCOUNTER SYMPTOMS: HEMOPTYSIS: 0

## 2024-03-26 ENCOUNTER — HOME CARE VISIT (OUTPATIENT)
Facility: HOME HEALTH | Age: 79
End: 2024-03-26
Payer: MEDICARE

## 2024-03-26 VITALS
DIASTOLIC BLOOD PRESSURE: 60 MMHG | RESPIRATION RATE: 16 BRPM | OXYGEN SATURATION: 97 % | TEMPERATURE: 98.1 F | SYSTOLIC BLOOD PRESSURE: 124 MMHG | HEART RATE: 56 BPM

## 2024-03-26 PROCEDURE — G0299 HHS/HOSPICE OF RN EA 15 MIN: HCPCS

## 2024-03-26 NOTE — HOME HEALTH
Subjective: Patient states that its stinging  Falls since last visit No(if yes complete the Fall Tracking Form and include bsrifallreport):   Caregiver involvement changes: no  Home health supplies by type and quantity ordered/delivered this visit include: no    Clinician asked if patient has had any physician contact since last home care visit and patient states: N/A  Clinician asked if patient has any new or changed medications and patient states:  N/A   If Yes, were medications reconciled? N/A   Was the certifying physician notified of changes in medications? N/A     Clinical assessment (what this visit means for the patient overall and need for ongoing skilled care) and progress or lack of progress towards SPECIFIC goals: Patients wound is healing well, less slough. Wound care complete. SN needed for further eval and treatment.    Written Teaching Material Utilized: N/A    Interdisciplinary communication with: N/A     Discharge planning as follows: When goals are met    Specific plan for next visit: Educate in s/s of infection and when to call MD DANIELLE or [phone]

## 2024-03-27 ENCOUNTER — HOME CARE VISIT (OUTPATIENT)
Dept: HOME HEALTH SERVICES | Facility: HOME HEALTH | Age: 79
End: 2024-03-27
Payer: MEDICARE

## 2024-03-29 ENCOUNTER — HOME CARE VISIT (OUTPATIENT)
Facility: HOME HEALTH | Age: 79
End: 2024-03-29
Payer: MEDICARE

## 2024-03-29 VITALS
RESPIRATION RATE: 18 BRPM | HEART RATE: 67 BPM | DIASTOLIC BLOOD PRESSURE: 84 MMHG | OXYGEN SATURATION: 98 % | SYSTOLIC BLOOD PRESSURE: 130 MMHG | TEMPERATURE: 98.6 F

## 2024-03-29 PROCEDURE — G0299 HHS/HOSPICE OF RN EA 15 MIN: HCPCS

## 2024-03-29 NOTE — HOME HEALTH
Subjective: Patient denies pain  Falls since last visit No(if yes complete the Fall Tracking Form and include bsrifallreport):   Caregiver involvement changes: no  Home health supplies by type and quantity ordered/delivered this visit include: Supplies delivered    Clinician asked if patient has had any physician contact since last home care visit and patient states: N/A  Clinician asked if patient has any new or changed medications and patient states:  N/A   If Yes, were medications reconciled? N/A   Was the certifying physician notified of changes in medications? N/A     Clinical assessment (what this visit means for the patient overall and need for ongoing skilled care) and progress or lack of progress towards SPECIFIC goals: Patient wound is healing, but has alot of purulent drainage and some slough in the bottom and edges of wound. SN needed for further eval and treatment.    Written Teaching Material Utilized: N/A    Interdisciplinary communication with: N/A     Discharge planning as follows: When goals are met    Specific plan for next visit: Educate in s/s of infection and when to call MD DANIELLE or [phone]

## 2024-04-01 ENCOUNTER — HOME CARE VISIT (OUTPATIENT)
Facility: HOME HEALTH | Age: 79
End: 2024-04-01
Payer: MEDICARE

## 2024-04-01 VITALS
SYSTOLIC BLOOD PRESSURE: 128 MMHG | RESPIRATION RATE: 16 BRPM | OXYGEN SATURATION: 98 % | DIASTOLIC BLOOD PRESSURE: 74 MMHG | TEMPERATURE: 98.4 F | HEART RATE: 62 BPM

## 2024-04-01 PROCEDURE — G0299 HHS/HOSPICE OF RN EA 15 MIN: HCPCS

## 2024-04-02 NOTE — CASE COMMUNICATION
Patient has that slick shiny coating of slough over the ear I feel is impeding healing. How do you feel about plurogel and maybe an alginate, or honey and alginate to debride the area. My only concern is using something over the debredder to keep it from running down, but feel the alginate would help?

## 2024-04-02 NOTE — HOME HEALTH
Subjective:Patient denies pain  Falls since last visit No(if yes complete the Fall Tracking Form and include bsrifallreport):   Caregiver involvement changes: no  Home health supplies by type and quantity ordered/delivered this visit include: no    Clinician asked if patient has had any physician contact since last home care visit and patient states: N/A  Clinician asked if patient has any new or changed medications and patient states:  N/A   If Yes, were medications reconciled? N/A   Was the certifying physician notified of changes in medications? N/A     Clinical assessment (what this visit means for the patient overall and need for ongoing skilled care) and progress or lack of progress towards SPECIFIC goals: Patients earis healing but does have slough that is impedding the healing process. SN needed for further eval and treatment.    Written Teaching Material Utilized: N/A    Interdisciplinary communication with: N/A     Discharge planning as follows: When goals are met    Specific plan for next visit: Instruct in safety issues regarding Diet

## 2024-04-03 ENCOUNTER — HOME CARE VISIT (OUTPATIENT)
Facility: HOME HEALTH | Age: 79
End: 2024-04-03
Payer: MEDICARE

## 2024-04-03 PROCEDURE — G0300 HHS/HOSPICE OF LPN EA 15 MIN: HCPCS

## 2024-04-04 VITALS
RESPIRATION RATE: 20 BRPM | TEMPERATURE: 98.8 F | DIASTOLIC BLOOD PRESSURE: 64 MMHG | OXYGEN SATURATION: 98 % | HEART RATE: 64 BPM | SYSTOLIC BLOOD PRESSURE: 124 MMHG

## 2024-04-04 ASSESSMENT — ENCOUNTER SYMPTOMS: HEMOPTYSIS: 0

## 2024-04-04 NOTE — HOME HEALTH
Subjective: Pt states he had two questionable areas removed from the arm yesterday by Dr. Lundberg  Falls since last visit No(if yes complete the Fall Tracking Form and include bsrifallreport):   Caregiver involvement changes: Wife is primary caregiver   Home health supplies by type and quantity ordered/delivered this visit include: none     Clinician asked if patient has had any physician contact since last home care visit and patient states: NO  Clinician asked if patient has any new or changed medications and patient states:  NO   If Yes, were medications reconciled? NO   Was the certifying physician notified of changes in medications? NO     Clinical assessment (what this visit means for the patient overall and need for ongoing skilled care) and progress or lack of progress towards SPECIFIC goals: Pt continues to require SN for wound care     Written Teaching Material Utilized: N/A    Interdisciplinary communication with: N/A for the purpose of NA2    Discharge planning as follows: When wound is 100% healed    Specific plan for next visit: Wound care

## 2024-04-05 ENCOUNTER — HOME CARE VISIT (OUTPATIENT)
Facility: HOME HEALTH | Age: 79
End: 2024-04-05
Payer: MEDICARE

## 2024-04-05 PROCEDURE — G0300 HHS/HOSPICE OF LPN EA 15 MIN: HCPCS

## 2024-04-06 VITALS
DIASTOLIC BLOOD PRESSURE: 78 MMHG | HEART RATE: 58 BPM | TEMPERATURE: 98.8 F | SYSTOLIC BLOOD PRESSURE: 129 MMHG | RESPIRATION RATE: 20 BRPM | OXYGEN SATURATION: 100 %

## 2024-04-06 ASSESSMENT — ENCOUNTER SYMPTOMS: HEMOPTYSIS: 0

## 2024-04-06 NOTE — HOME HEALTH
Subjective: Pt states he feels pretty good   Falls since last visit No(if yes complete the Fall Tracking Form and include bsrifallreport):   Caregiver involvement changes: Wife is primary caregiver   Home health supplies by type and quantity ordered/delivered this visit include: none     Clinician asked if patient has had any physician contact since last home care visit and patient states: NO  Clinician asked if patient has any new or changed medications and patient states:  NO   If Yes, were medications reconciled? NO   Was the certifying physician notified of changes in medications? NO     Clinical assessment (what this visit means for the patient overall and need for ongoing skilled care) and progress or lack of progress towards SPECIFIC goals: Pt continues to require SN for wound care     Written Teaching Material Utilized: N/A    Interdisciplinary communication with: JIM for the purpose of NA     Discharge planning as follows: When wound is 100% healed    Specific plan for next visit: Wound care

## 2024-04-08 ENCOUNTER — HOME CARE VISIT (OUTPATIENT)
Facility: HOME HEALTH | Age: 79
End: 2024-04-08
Payer: MEDICARE

## 2024-04-08 VITALS
HEART RATE: 60 BPM | TEMPERATURE: 97.4 F | RESPIRATION RATE: 16 BRPM | WEIGHT: 165 LBS | BODY MASS INDEX: 22.38 KG/M2 | DIASTOLIC BLOOD PRESSURE: 74 MMHG | OXYGEN SATURATION: 99 % | SYSTOLIC BLOOD PRESSURE: 132 MMHG

## 2024-04-08 PROCEDURE — G0299 HHS/HOSPICE OF RN EA 15 MIN: HCPCS

## 2024-04-08 NOTE — HOME HEALTH
Subjective: Patient denies pain  Falls since last visit No(if yes complete the Fall Tracking Form and include bsrifallreport):   Caregiver involvement changes: no  Home health supplies by type and quantity ordered/delivered this visit include: no    Clinician asked if patient has had any physician contact since last home care visit and patient states: NO  Clinician asked if patient has any new or changed medications and patient states:  N/A   If Yes, were medications reconciled? N/A   Was the certifying physician notified of changes in medications? N/A     Clinical assessment (what this visit means for the patient overall and need for ongoing skilled care) and progress or lack of progress towards SPECIFIC goals: Patient denies pain this visit. Patients surgical site is healing very well, and doctor just took 2 places off of the right arm also and sending off for biopsy. SN needed for further eval and treatment    Written Teaching Material Utilized: N/A    Interdisciplinary communication with: N/A     Discharge planning as follows: When goals are met    Specific plan for next visit: help patient wash his hair

## 2024-04-10 ENCOUNTER — HOME CARE VISIT (OUTPATIENT)
Facility: HOME HEALTH | Age: 79
End: 2024-04-10
Payer: MEDICARE

## 2024-04-10 VITALS
RESPIRATION RATE: 16 BRPM | HEART RATE: 61 BPM | DIASTOLIC BLOOD PRESSURE: 62 MMHG | OXYGEN SATURATION: 98 % | SYSTOLIC BLOOD PRESSURE: 124 MMHG | TEMPERATURE: 98.5 F

## 2024-04-10 PROCEDURE — G0299 HHS/HOSPICE OF RN EA 15 MIN: HCPCS

## 2024-04-10 NOTE — HOME HEALTH
Subjective: Patient denies pain  Falls since last visit No(if yes complete the Fall Tracking Form and include bsrifallreport):   Caregiver involvement changes: no  Home health supplies by type and quantity ordered/delivered this visit include: no    Clinician asked if patient has had any physician contact since last home care visit and patient states: N/A  Clinician asked if patient has any new or changed medications and patient states:  N/A   If Yes, were medications reconciled? N/A   Was the certifying physician notified of changes in medications? N/A     Clinical assessment (what this visit means for the patient overall and need for ongoing skilled care) and progress or lack of progress towards SPECIFIC goals: Patients wound care is complete. Washed patients hair today in sink to avoid getting the ear wet. SN needed for further eval and treatment.     Written Teaching Material Utilized: N/A    Interdisciplinary communication with: N/A     Discharge planning as follows: When goals are met    Specific plan for next visit: Educate in s/s of infection   and when to call MD DANIELLE or [phone]

## 2024-04-12 ENCOUNTER — HOME CARE VISIT (OUTPATIENT)
Facility: HOME HEALTH | Age: 79
End: 2024-04-12
Payer: MEDICARE

## 2024-04-12 PROCEDURE — G0299 HHS/HOSPICE OF RN EA 15 MIN: HCPCS

## 2024-04-13 VITALS
RESPIRATION RATE: 18 BRPM | HEART RATE: 89 BPM | TEMPERATURE: 97.3 F | DIASTOLIC BLOOD PRESSURE: 67 MMHG | SYSTOLIC BLOOD PRESSURE: 123 MMHG | OXYGEN SATURATION: 99 %

## 2024-04-13 ASSESSMENT — ENCOUNTER SYMPTOMS: STOOL DESCRIPTION: FORMED

## 2024-04-13 NOTE — HOME HEALTH
Subjective: patient states he is feeling well, feels the wounds have healed a lot  Falls since last visit No(if yes complete the Fall Tracking Form and include bsrifallreport):   Caregiver involvement changes: N/a  Home health supplies by type and quantity ordered/delivered this visit include: N/A    Clinician asked if patient has had any physician contact since last home care visit and patient states: NO  Clinician asked if patient has any new or changed medications and patient states:  N/A   If Yes, were medications reconciled? N/A   Was the certifying physician notified of changes in medications? N/A no    Clinical assessment (what this visit means for the patient overall and need for ongoing skilled care) and progress or lack of progress towards SPECIFIC goals: Patient is 77 y/o male with wound and hx of Ca. living in one story home with wife as caregivers at all times.   Medication education done this visit includes n/a.  Education given to patient on wound care.   Patient response to visit includes understanding of wound care.   Needs continued SN to prevent infection and rehospitalization due to illness/diagnosis.   Needs continued SN for wound care.         Written Teaching Material Utilized: N/A    Interdisciplinary communication with: N/A    Discharge planning as follows: When caregiver is competent in wound care and wound is 90% healed    Specific plan for next visit: wound care

## 2024-04-15 ENCOUNTER — HOME CARE VISIT (OUTPATIENT)
Facility: HOME HEALTH | Age: 79
End: 2024-04-15
Payer: MEDICARE

## 2024-04-15 VITALS
RESPIRATION RATE: 16 BRPM | SYSTOLIC BLOOD PRESSURE: 140 MMHG | HEART RATE: 65 BPM | OXYGEN SATURATION: 100 % | TEMPERATURE: 97.5 F | DIASTOLIC BLOOD PRESSURE: 66 MMHG

## 2024-04-15 PROCEDURE — G0300 HHS/HOSPICE OF LPN EA 15 MIN: HCPCS

## 2024-04-16 NOTE — HOME HEALTH
Subjective: \"Only strangers come in through the front door.\"  Falls since last visit No(if yes complete the Fall Tracking Form and include bsrifallreport):   Caregiver involvement changes: No  Home health supplies by type and quantity ordered/delivered this visit include: n/a    Clinician asked if patient has had any physician contact since last home care visit and patient states: NO  Clinician asked if patient has any new or changed medications and patient states:  NO   If Yes, were medications reconciled? N/A   Was the certifying physician notified of changes in medications? N/A     Clinical assessment (what this visit means for the patient overall and need for ongoing skilled care) and progress or lack of progress towards SPECIFIC goals: Pt at risk for rehospitalization r/t fall risk, infection, wound exacerbation.  Wound healing goals not met.    Written Teaching Material Utilized: N/A    Interdisciplinary communication with: N/A    Discharge planning as follows: When wound is 100% healed and When goals are met    Specific plan for next visit: Assessment, wound care, education as needed

## 2024-04-17 ENCOUNTER — HOME CARE VISIT (OUTPATIENT)
Facility: HOME HEALTH | Age: 79
End: 2024-04-17
Payer: MEDICARE

## 2024-04-17 VITALS
DIASTOLIC BLOOD PRESSURE: 64 MMHG | RESPIRATION RATE: 16 BRPM | HEART RATE: 58 BPM | TEMPERATURE: 98.2 F | OXYGEN SATURATION: 96 % | SYSTOLIC BLOOD PRESSURE: 124 MMHG

## 2024-04-17 PROCEDURE — G0299 HHS/HOSPICE OF RN EA 15 MIN: HCPCS

## 2024-04-17 NOTE — HOME HEALTH
Subjective: Patient denies pain  Falls since last visit No(if yes complete the Fall Tracking Form and include bsrifallreport):   Caregiver involvement changes: no  Home health supplies by type and quantity ordered/delivered this visit include: no    Clinician asked if patient has had any physician contact since last home care visit and patient states: N/A  Clinician asked if patient has any new or changed medications and patient states:  N/A   If Yes, were medications reconciled? N/A   Was the certifying physician notified of changes in medications? N/A     Clinical assessment (what this visit means for the patient overall and need for ongoing skilled care) and progress or lack of progress towards SPECIFIC goals: Patients wound is healing very well. SN needed for further eval and treatment.    Written Teaching Material Utilized: N/A    Interdisciplinary communication with: N/A     Discharge planning as follows: When goals are met    Specific plan for next visit: Instruct in safety issues regarding Diet

## 2024-04-18 ENCOUNTER — HOME CARE VISIT (OUTPATIENT)
Dept: HOME HEALTH SERVICES | Facility: HOME HEALTH | Age: 79
End: 2024-04-18
Payer: MEDICARE

## 2024-04-19 ENCOUNTER — HOME CARE VISIT (OUTPATIENT)
Facility: HOME HEALTH | Age: 79
End: 2024-04-19

## 2024-04-19 VITALS
RESPIRATION RATE: 16 BRPM | HEART RATE: 55 BPM | OXYGEN SATURATION: 99 % | DIASTOLIC BLOOD PRESSURE: 64 MMHG | TEMPERATURE: 97 F | SYSTOLIC BLOOD PRESSURE: 128 MMHG

## 2024-04-19 PROCEDURE — G0299 HHS/HOSPICE OF RN EA 15 MIN: HCPCS

## 2024-04-22 ENCOUNTER — HOME CARE VISIT (OUTPATIENT)
Facility: HOME HEALTH | Age: 79
End: 2024-04-22
Payer: MEDICARE

## 2024-04-22 PROCEDURE — G0300 HHS/HOSPICE OF LPN EA 15 MIN: HCPCS

## 2024-04-24 ENCOUNTER — HOME CARE VISIT (OUTPATIENT)
Facility: HOME HEALTH | Age: 79
End: 2024-04-24
Payer: MEDICARE

## 2024-04-24 VITALS
DIASTOLIC BLOOD PRESSURE: 74 MMHG | OXYGEN SATURATION: 98 % | TEMPERATURE: 98.2 F | RESPIRATION RATE: 18 BRPM | HEART RATE: 78 BPM | SYSTOLIC BLOOD PRESSURE: 128 MMHG

## 2024-04-24 PROCEDURE — G0300 HHS/HOSPICE OF LPN EA 15 MIN: HCPCS

## 2024-04-24 ASSESSMENT — ENCOUNTER SYMPTOMS: HEMOPTYSIS: 0

## 2024-04-26 ENCOUNTER — HOME CARE VISIT (OUTPATIENT)
Facility: HOME HEALTH | Age: 79
End: 2024-04-26

## 2024-04-26 PROCEDURE — G0299 HHS/HOSPICE OF RN EA 15 MIN: HCPCS

## 2024-04-28 VITALS
HEART RATE: 58 BPM | DIASTOLIC BLOOD PRESSURE: 64 MMHG | SYSTOLIC BLOOD PRESSURE: 128 MMHG | RESPIRATION RATE: 16 BRPM | OXYGEN SATURATION: 99 % | TEMPERATURE: 97.9 F

## 2024-04-29 ENCOUNTER — HOME CARE VISIT (OUTPATIENT)
Facility: HOME HEALTH | Age: 79
End: 2024-04-29
Payer: MEDICARE

## 2024-04-29 VITALS
TEMPERATURE: 98.5 F | DIASTOLIC BLOOD PRESSURE: 68 MMHG | SYSTOLIC BLOOD PRESSURE: 128 MMHG | HEART RATE: 61 BPM | RESPIRATION RATE: 16 BRPM | OXYGEN SATURATION: 99 %

## 2024-04-29 PROCEDURE — G0299 HHS/HOSPICE OF RN EA 15 MIN: HCPCS

## 2024-04-29 NOTE — HOME HEALTH
Subjective: Patient denies pain  Falls since last visit No(if yes complete the Fall Tracking Form and include bsrifallreport):   Caregiver involvement changes: no  Home health supplies by type and quantity ordered/delivered this visit include: no    Clinician asked if patient has had any physician contact since last home care visit and patient states: N/A  Clinician asked if patient has any new or changed medications and patient states:  N/A   If Yes, were medications reconciled? N/A   Was the certifying physician notified of changes in medications? N/A     Clinical assessment (what this visit means for the patient overall and need for ongoing skilled care) and progress or lack of progress towards SPECIFIC goals: Patients wound care is complete and is healing very well. SN needed for further eval and treatment.    Written Teaching Material Utilized: N/A    Interdisciplinary communication with: N/A     Discharge planning as follows: When goals are met    Specific plan for next visit: Educate in s/s of infection and when to call MD DANIELLE or [phone]

## 2024-04-30 VITALS
DIASTOLIC BLOOD PRESSURE: 74 MMHG | OXYGEN SATURATION: 96 % | TEMPERATURE: 98.6 F | HEART RATE: 78 BPM | SYSTOLIC BLOOD PRESSURE: 128 MMHG | RESPIRATION RATE: 20 BRPM

## 2024-04-30 ASSESSMENT — ENCOUNTER SYMPTOMS: HEMOPTYSIS: 0

## 2024-04-30 NOTE — HOME HEALTH
Subjective: Pt had no complaints of concerns at this time   Falls since last visit No(if yes complete the Fall Tracking Form and include bsrifallreport):   Caregiver involvement changes: Wife is primary caregiver  Home health supplies by type and quantity ordered/delivered this visit include: yes     Clinician asked if patient has had any physician contact since last home care visit and patient states: NO  Clinician asked if patient has any new or changed medications and patient states:  NO   If Yes, were medications reconciled? NO   Was the certifying physician notified of changes in medications? NO     Clinical assessment (what this visit means for the patient overall and need for ongoing skilled care) and progress or lack of progress towards SPECIFIC goals: Pt continues to require SN ofr wound care     Written Teaching Material Utilized: N/A    Interdisciplinary communication with: N/A for the purpose of NA     Discharge planning as follows: When wound is 100% healed    Specific plan for next visit: Wound care

## 2024-05-01 ENCOUNTER — HOME CARE VISIT (OUTPATIENT)
Facility: HOME HEALTH | Age: 79
End: 2024-05-01
Payer: MEDICARE

## 2024-05-01 VITALS
HEART RATE: 55 BPM | DIASTOLIC BLOOD PRESSURE: 64 MMHG | SYSTOLIC BLOOD PRESSURE: 128 MMHG | TEMPERATURE: 99.2 F | RESPIRATION RATE: 16 BRPM | OXYGEN SATURATION: 98 %

## 2024-05-01 PROCEDURE — G0299 HHS/HOSPICE OF RN EA 15 MIN: HCPCS

## 2024-05-01 NOTE — HOME HEALTH
Subjective: Patient denies pain  Falls since last visit No(if yes complete the Fall Tracking Form and include bsrifallreport):   Caregiver involvement changes: no  Home health supplies by type and quantity ordered/delivered this visit include: no    Clinician asked if patient has had any physician contact since last home care visit and patient states: N/A  Clinician asked if patient has any new or changed medications and patient states:  N/A   If Yes, were medications reconciled? N/A   Was the certifying physician notified of changes in medications? N/A     Clinical assessment (what this visit means for the patient overall and need for ongoing skilled care) and progress or lack of progress towards SPECIFIC goals: Wound is really healing fast. Patient wound care is complete. SN needed for further eval and treatment.    Written Teaching Material Utilized: N/A    Interdisciplinary communication with: N/A     Discharge planning as follows: When goals are met    Specific plan for next visit: Educate on diabetes disease process

## 2024-05-03 ENCOUNTER — HOME CARE VISIT (OUTPATIENT)
Facility: HOME HEALTH | Age: 79
End: 2024-05-03
Payer: MEDICARE

## 2024-05-03 VITALS
RESPIRATION RATE: 16 BRPM | HEART RATE: 54 BPM | TEMPERATURE: 98.6 F | DIASTOLIC BLOOD PRESSURE: 60 MMHG | SYSTOLIC BLOOD PRESSURE: 118 MMHG | OXYGEN SATURATION: 99 %

## 2024-05-03 PROCEDURE — G0299 HHS/HOSPICE OF RN EA 15 MIN: HCPCS

## 2024-05-03 NOTE — HOME HEALTH
Subjective: Patient denies pain  Falls since last visit No(if yes complete the Fall Tracking Form and include bsrifallreport):   Caregiver involvement changes: no  Home health supplies by type and quantity ordered/delivered this visit include: no    Clinician asked if patient has had any physician contact since last home care visit and patient states: N/A  Clinician asked if patient has any new or changed medications and patient states:  N/A   If Yes, were medications reconciled? N/A   Was the certifying physician notified of changes in medications? N/A     Clinical assessment (what this visit means for the patient overall and need for ongoing skilled care) and progress or lack of progress towards SPECIFIC goals: Patient wound is healing well. Wound care complete for this visit. SN needed for further eval and treatment.    Written Teaching Material Utilized: N/A    Interdisciplinary communication with: N/A     Discharge planning as follows: When goals are met    Specific plan for next visit: Instruct in safety issues regarding Diet

## 2024-05-06 ENCOUNTER — HOME CARE VISIT (OUTPATIENT)
Facility: HOME HEALTH | Age: 79
End: 2024-05-06
Payer: MEDICARE

## 2024-05-06 PROCEDURE — G0300 HHS/HOSPICE OF LPN EA 15 MIN: HCPCS

## 2024-05-07 VITALS
SYSTOLIC BLOOD PRESSURE: 110 MMHG | OXYGEN SATURATION: 98 % | DIASTOLIC BLOOD PRESSURE: 60 MMHG | RESPIRATION RATE: 20 BRPM | TEMPERATURE: 98 F | HEART RATE: 54 BPM

## 2024-05-07 ASSESSMENT — ENCOUNTER SYMPTOMS: HEMOPTYSIS: 0

## 2024-05-07 NOTE — HOME HEALTH
Subjective: Pt has no complaints or concerns at this time   Falls since last visit No(if yes complete the Fall Tracking Form and include bsrifallreport):   Caregiver involvement changes: Wife is primary caregiver   Home health supplies by type and quantity ordered/delivered this visit include: none     Clinician asked if patient has had any physician contact since last home care visit and patient states: NO  Clinician asked if patient has any new or changed medications and patient states:  NO   If Yes, were medications reconciled? NO   Was the certifying physician notified of changes in medications? NO     Clinical assessment (what this visit means for the patient overall and need for ongoing skilled care) and progress or lack of progress towards SPECIFIC goals: Pt continues to require SN for wound care.     Written Teaching Material Utilized: N/A    Interdisciplinary communication with: N/A for the purpose of NA     Discharge planning as follows: When wound is 100% healed    Specific plan for next visit: Wound care

## 2024-05-08 ENCOUNTER — HOME CARE VISIT (OUTPATIENT)
Facility: HOME HEALTH | Age: 79
End: 2024-05-08
Payer: MEDICARE

## 2024-05-08 PROCEDURE — G0300 HHS/HOSPICE OF LPN EA 15 MIN: HCPCS

## 2024-05-09 VITALS
HEART RATE: 53 BPM | TEMPERATURE: 98.1 F | OXYGEN SATURATION: 98 % | DIASTOLIC BLOOD PRESSURE: 64 MMHG | RESPIRATION RATE: 18 BRPM | SYSTOLIC BLOOD PRESSURE: 130 MMHG

## 2024-05-09 ASSESSMENT — ENCOUNTER SYMPTOMS: HEMOPTYSIS: 0

## 2024-05-10 ENCOUNTER — HOME CARE VISIT (OUTPATIENT)
Facility: HOME HEALTH | Age: 79
End: 2024-05-10
Payer: MEDICARE

## 2024-05-10 PROCEDURE — G0299 HHS/HOSPICE OF RN EA 15 MIN: HCPCS

## 2024-05-10 NOTE — HOME HEALTH
Subjective: Pt states he has no pain or discomfort   Falls since last visit No(if yes complete the Fall Tracking Form and include bsrifallreport):   Caregiver involvement changes: Wife assist as needed   Home health supplies by type and quantity ordered/delivered this visit include: none     Clinician asked if patient has had any physician contact since last home care visit and patient states: NO  Clinician asked if patient has any new or changed medications and patient states:  NO   If Yes, were medications reconciled? NO   Was the certifying physician notified of changes in medications? NO     Clinical assessment (what this visit means for the patient overall and need for ongoing skilled care) and progress or lack of progress towards SPECIFIC goals: Pt continues to require SN for wound care     Written Teaching Material Utilized: N/A    Interdisciplinary communication with: N/A for the purpose of NA     Discharge planning as follows: When wound is 100% healed    Specific plan for next visit: WOund care

## 2024-05-11 VITALS
DIASTOLIC BLOOD PRESSURE: 60 MMHG | HEART RATE: 57 BPM | TEMPERATURE: 98.6 F | RESPIRATION RATE: 16 BRPM | SYSTOLIC BLOOD PRESSURE: 126 MMHG | OXYGEN SATURATION: 99 %

## 2024-05-11 NOTE — HOME HEALTH
Subjective: Patient denies pain  Falls since last visit No(if yes complete the Fall Tracking Form and include bsrifallreport):   Caregiver involvement changes: no  Home health supplies by type and quantity ordered/delivered this visit include: no    Clinician asked if patient has had any physician contact since last home care visit and patient states: N/A  Clinician asked if patient has any new or changed medications and patient states:  N/A   If Yes, were medications reconciled? N/A   Was the certifying physician notified of changes in medications? N/A     Clinical assessment (what this visit means for the patient overall and need for ongoing skilled care) and progress or lack of progress towards SPECIFIC goals: Pt wound care is complete. Will call surgeon today to discuss plan of care. SN needed for further eval and treatment.     Written Teaching Material Utilized: N/A    Interdisciplinary communication with: N/A     Discharge planning as follows: When goals are met    Specific plan for next visit: Educate in s/s of infection and when to call MD DANIELLE or [phone]

## 2024-05-13 ENCOUNTER — HOME CARE VISIT (OUTPATIENT)
Facility: HOME HEALTH | Age: 79
End: 2024-05-13
Payer: MEDICARE

## 2024-05-13 VITALS
OXYGEN SATURATION: 98 % | SYSTOLIC BLOOD PRESSURE: 132 MMHG | HEART RATE: 65 BPM | DIASTOLIC BLOOD PRESSURE: 73 MMHG | TEMPERATURE: 98 F | RESPIRATION RATE: 18 BRPM

## 2024-05-13 PROCEDURE — G0300 HHS/HOSPICE OF LPN EA 15 MIN: HCPCS

## 2024-05-15 ENCOUNTER — HOME CARE VISIT (OUTPATIENT)
Facility: HOME HEALTH | Age: 79
End: 2024-05-15
Payer: MEDICARE

## 2024-05-15 VITALS
TEMPERATURE: 98.4 F | SYSTOLIC BLOOD PRESSURE: 132 MMHG | OXYGEN SATURATION: 99 % | RESPIRATION RATE: 16 BRPM | DIASTOLIC BLOOD PRESSURE: 68 MMHG | HEART RATE: 61 BPM

## 2024-05-15 PROCEDURE — G0299 HHS/HOSPICE OF RN EA 15 MIN: HCPCS

## 2024-05-15 NOTE — HOME HEALTH
Subjective:Patient denies pain today  Falls since last visit No(if yes complete the Fall Tracking Form and include bsrifallreport):   Caregiver involvement changes: no  Home health supplies by type and quantity ordered/delivered this visit include: no    Clinician asked if patient has had any physician contact since last home care visit and patient states: N/A  Clinician asked if patient has any new or changed medications and patient states:  N/A   If Yes, were medications reconciled? N/A   Was the certifying physician notified of changes in medications? N/A    Clinical assessment (what this visit means for the patient overall and need for ongoing skilled care) and progress or lack of progress towards SPECIFIC goals: Patients wound is healed without drainage. Patient agrees with discharge today.     Written Teaching Material Utilized: N/A    Interdisciplinary communication with: N/A     Discharge planning as follows: When goals are met    Specific plan for next visit: Progress patient to discharge

## 2024-05-16 ASSESSMENT — ENCOUNTER SYMPTOMS: HEMOPTYSIS: 0

## 2024-05-16 NOTE — HOME HEALTH
Subjective: Pt had no complaints or concerns at this time   Falls since last visit No(if yes complete the Fall Tracking Form and include bsrifallreport):   Caregiver involvement changes: Wife is primary caregiver   Home health supplies by type and quantity ordered/delivered this visit include: none     Clinician asked if patient has had any physician contact since last home care visit and patient states: NO  Clinician asked if patient has any new or changed medications and patient states:  NO   If Yes, were medications reconciled? NO   Was the certifying physician notified of changes in medications? NO     Clinical assessment (what this visit means for the patient overall and need for ongoing skilled care) and progress or lack of progress towards SPECIFIC goals: Pt continues to require SN services for wound care     Written Teaching Material Utilized: N/A    Interdisciplinary communication with: N/A for the purpose of NA     Discharge planning as follows: When wound is 100% healed    Specific plan for next visit: Wound care

## 2024-07-16 ENCOUNTER — OFFICE VISIT (OUTPATIENT)
Facility: CLINIC | Age: 79
End: 2024-07-16
Payer: MEDICARE

## 2024-07-16 VITALS
WEIGHT: 166 LBS | RESPIRATION RATE: 16 BRPM | BODY MASS INDEX: 22.48 KG/M2 | HEIGHT: 72 IN | OXYGEN SATURATION: 98 % | DIASTOLIC BLOOD PRESSURE: 72 MMHG | HEART RATE: 54 BPM | SYSTOLIC BLOOD PRESSURE: 128 MMHG | TEMPERATURE: 97.8 F

## 2024-07-16 DIAGNOSIS — N18.4 CHRONIC KIDNEY DISEASE, STAGE 4 (SEVERE) (HCC): ICD-10-CM

## 2024-07-16 DIAGNOSIS — Z00.00 ENCOUNTER FOR SUBSEQUENT ANNUAL WELLNESS VISIT (AWV) IN MEDICARE PATIENT: Primary | ICD-10-CM

## 2024-07-16 DIAGNOSIS — Z94.0 HISTORY OF KIDNEY TRANSPLANT: ICD-10-CM

## 2024-07-16 DIAGNOSIS — D84.9 IMMUNOSUPPRESSION (HCC): ICD-10-CM

## 2024-07-16 DIAGNOSIS — I10 PRIMARY HYPERTENSION: ICD-10-CM

## 2024-07-16 DIAGNOSIS — E55.9 VITAMIN D DEFICIENCY: ICD-10-CM

## 2024-07-16 DIAGNOSIS — E78.00 PURE HYPERCHOLESTEROLEMIA: ICD-10-CM

## 2024-07-16 DIAGNOSIS — N18.9 ANEMIA DUE TO CHRONIC KIDNEY DISEASE, UNSPECIFIED CKD STAGE: ICD-10-CM

## 2024-07-16 DIAGNOSIS — D63.1 ANEMIA DUE TO CHRONIC KIDNEY DISEASE, UNSPECIFIED CKD STAGE: ICD-10-CM

## 2024-07-16 DIAGNOSIS — E11.9 TYPE 2 DIABETES MELLITUS WITHOUT COMPLICATION, WITHOUT LONG-TERM CURRENT USE OF INSULIN (HCC): ICD-10-CM

## 2024-07-16 DIAGNOSIS — I25.10 PLAQUE IN HEART ARTERY: ICD-10-CM

## 2024-07-16 DIAGNOSIS — C44.201 MALIGNANT NEOPLASM OF SKIN OF EAR AND EXTERNAL AUDITORY CANAL: ICD-10-CM

## 2024-07-16 PROCEDURE — G0439 PPPS, SUBSEQ VISIT: HCPCS | Performed by: PHYSICIAN ASSISTANT

## 2024-07-16 PROCEDURE — G8427 DOCREV CUR MEDS BY ELIG CLIN: HCPCS | Performed by: PHYSICIAN ASSISTANT

## 2024-07-16 PROCEDURE — 3074F SYST BP LT 130 MM HG: CPT | Performed by: PHYSICIAN ASSISTANT

## 2024-07-16 PROCEDURE — 1123F ACP DISCUSS/DSCN MKR DOCD: CPT | Performed by: PHYSICIAN ASSISTANT

## 2024-07-16 PROCEDURE — 99214 OFFICE O/P EST MOD 30 MIN: CPT | Performed by: PHYSICIAN ASSISTANT

## 2024-07-16 PROCEDURE — 3078F DIAST BP <80 MM HG: CPT | Performed by: PHYSICIAN ASSISTANT

## 2024-07-16 PROCEDURE — 1036F TOBACCO NON-USER: CPT | Performed by: PHYSICIAN ASSISTANT

## 2024-07-16 PROCEDURE — G8420 CALC BMI NORM PARAMETERS: HCPCS | Performed by: PHYSICIAN ASSISTANT

## 2024-07-16 SDOH — ECONOMIC STABILITY: FOOD INSECURITY: WITHIN THE PAST 12 MONTHS, YOU WORRIED THAT YOUR FOOD WOULD RUN OUT BEFORE YOU GOT MONEY TO BUY MORE.: NEVER TRUE

## 2024-07-16 SDOH — ECONOMIC STABILITY: INCOME INSECURITY: HOW HARD IS IT FOR YOU TO PAY FOR THE VERY BASICS LIKE FOOD, HOUSING, MEDICAL CARE, AND HEATING?: NOT HARD AT ALL

## 2024-07-16 SDOH — ECONOMIC STABILITY: FOOD INSECURITY: WITHIN THE PAST 12 MONTHS, THE FOOD YOU BOUGHT JUST DIDN'T LAST AND YOU DIDN'T HAVE MONEY TO GET MORE.: NEVER TRUE

## 2024-07-16 ASSESSMENT — LIFESTYLE VARIABLES
HOW OFTEN DO YOU HAVE A DRINK CONTAINING ALCOHOL: NEVER
HOW MANY STANDARD DRINKS CONTAINING ALCOHOL DO YOU HAVE ON A TYPICAL DAY: PATIENT DOES NOT DRINK

## 2024-07-16 ASSESSMENT — PATIENT HEALTH QUESTIONNAIRE - PHQ9
SUM OF ALL RESPONSES TO PHQ QUESTIONS 1-9: 0
SUM OF ALL RESPONSES TO PHQ9 QUESTIONS 1 & 2: 0
SUM OF ALL RESPONSES TO PHQ QUESTIONS 1-9: 0
SUM OF ALL RESPONSES TO PHQ QUESTIONS 1-9: 0
2. FEELING DOWN, DEPRESSED OR HOPELESS: NOT AT ALL
SUM OF ALL RESPONSES TO PHQ QUESTIONS 1-9: 0
1. LITTLE INTEREST OR PLEASURE IN DOING THINGS: NOT AT ALL

## 2024-07-16 NOTE — PROGRESS NOTES
Poncho Driver Jr.  Identified pt with two pt identifiers(name and ).     Chief Complaint   Patient presents with    Medicare AWV     Room 4B //        Reviewed record In preparation for visit and have obtained necessary documentation.     1. Have you been to the ER, urgent care clinic or hospitalized since your last visit? No     2. Have you seen or consulted any other health care providers outside of the [facility] since your last visit? Include any pap smears or colon screening. No    Patient has an advance directive.     Vitals reviewed with provider.    Health Maintenance reviewed:     Health Maintenance Due   Topic    Respiratory Syncytial Virus (RSV) Pregnant or age 60 yrs+ (1 - 1-dose 60+ series)    DTaP/Tdap/Td vaccine ( - Tdap)    Lipids     COVID-19 Vaccine (2023- season)    Annual Wellness Visit (Medicare)           Wt Readings from Last 3 Encounters:   24 75.3 kg (166 lb)   24 74.8 kg (165 lb)   03/15/24 74.8 kg (165 lb)        Temp Readings from Last 3 Encounters:   05/15/24 98.4 °F (36.9 °C)   24 98 °F (36.7 °C) (Temporal)   05/10/24 98.6 °F (37 °C)        BP Readings from Last 3 Encounters:   05/15/24 132/68   24 132/73   05/10/24 126/60        Pulse Readings from Last 3 Encounters:   05/15/24 61   24 65   05/10/24 57             No data to display                  Learning Assessment:   :         2023     1:00 PM   [facility] AMB LEARNING ASSESSMENT   Primary Learner Patient   Primary Language ENGLISH   Learning Preference OTHER (COMMENT)   Answered By Patient   Relationship to Learner SELF         Fall Risk Assessment:         2024     1:38 PM 2023     5:13 PM 2022    11:00 AM 2021    11:47 AM   Amb Fall Risk Assessment and TUG Test   Do you feel unsteady or are you worried about falling?  no no     2 or more falls in past year? no no     Fall with injury in past year? no no     Fall in past 12 months?   0 0   Able to walk? 
Take by mouth daily  Patient not taking: Reported on 7/16/2024  Automatic Reconciliation, Ar   hydrocortisone 2.5 % cream APPLY TO THE AFFECTED AREA TWICE DAILY FOR 7 DAYS FOLLOWING THE COMPLETION OF 5FU  Patient not taking: Reported on 7/16/2024  Automatic Reconciliation, Ar       CareTeam (Including outside providers/suppliers regularly involved in providing care):   Patient Care Team:  Nadir Du PA-C as PCP - General  Nadir Du PA-C [facility]  Sabrina Felder MD as Consulting Physician      Reviewed and updated this visit:  Tobacco  Allergies  Med Hx  Surg Hx  Soc Hx  Fam Hx

## 2024-07-17 ENCOUNTER — LAB (OUTPATIENT)
Facility: CLINIC | Age: 79
End: 2024-07-17

## 2024-07-17 DIAGNOSIS — E78.00 PURE HYPERCHOLESTEROLEMIA: ICD-10-CM

## 2024-07-17 DIAGNOSIS — E11.9 TYPE 2 DIABETES MELLITUS WITHOUT COMPLICATION, WITHOUT LONG-TERM CURRENT USE OF INSULIN (HCC): ICD-10-CM

## 2024-07-18 LAB
ALBUMIN SERPL-MCNC: 3.6 G/DL (ref 3.5–5)
ALBUMIN/GLOB SERPL: 1.1 (ref 1.1–2.2)
ALP SERPL-CCNC: 74 U/L (ref 45–117)
ALT SERPL-CCNC: 16 U/L (ref 12–78)
ANION GAP SERPL CALC-SCNC: 5 MMOL/L (ref 5–15)
AST SERPL-CCNC: 12 U/L (ref 15–37)
BILIRUB SERPL-MCNC: 0.7 MG/DL (ref 0.2–1)
BUN SERPL-MCNC: 38 MG/DL (ref 6–20)
BUN/CREAT SERPL: 25 (ref 12–20)
CALCIUM SERPL-MCNC: 9.6 MG/DL (ref 8.5–10.1)
CHLORIDE SERPL-SCNC: 114 MMOL/L (ref 97–108)
CHOLEST SERPL-MCNC: 141 MG/DL
CO2 SERPL-SCNC: 24 MMOL/L (ref 21–32)
CREAT SERPL-MCNC: 1.54 MG/DL (ref 0.7–1.3)
EST. AVERAGE GLUCOSE BLD GHB EST-MCNC: 108 MG/DL
GLOBULIN SER CALC-MCNC: 3.3 G/DL (ref 2–4)
GLUCOSE SERPL-MCNC: 113 MG/DL (ref 65–100)
HBA1C MFR BLD: 5.4 % (ref 4–5.6)
HDLC SERPL-MCNC: 41 MG/DL
HDLC SERPL: 3.4 (ref 0–5)
LDLC SERPL CALC-MCNC: 89.6 MG/DL (ref 0–100)
POTASSIUM SERPL-SCNC: 4.3 MMOL/L (ref 3.5–5.1)
PROT SERPL-MCNC: 6.9 G/DL (ref 6.4–8.2)
SODIUM SERPL-SCNC: 143 MMOL/L (ref 136–145)
TRIGL SERPL-MCNC: 52 MG/DL
VLDLC SERPL CALC-MCNC: 10.4 MG/DL

## 2024-09-11 ENCOUNTER — HOSPITAL ENCOUNTER (OUTPATIENT)
Facility: HOSPITAL | Age: 79
Discharge: HOME | End: 2024-09-14
Payer: MEDICARE

## 2024-09-11 ENCOUNTER — CLINICAL DOCUMENTATION (OUTPATIENT)
Facility: HOSPITAL | Age: 79
End: 2024-09-11

## 2024-09-11 VITALS
HEIGHT: 72 IN | HEART RATE: 60 BPM | BODY MASS INDEX: 23.43 KG/M2 | SYSTOLIC BLOOD PRESSURE: 130 MMHG | WEIGHT: 173 LBS | DIASTOLIC BLOOD PRESSURE: 69 MMHG

## 2024-09-11 DIAGNOSIS — C44.42 SQUAMOUS CELL CARCINOMA OF SCALP: Primary | ICD-10-CM

## 2024-09-11 PROCEDURE — 99204 OFFICE O/P NEW MOD 45 MIN: CPT

## 2024-09-11 ASSESSMENT — PAIN SCALES - GENERAL: PAINLEVEL_OUTOF10: 0

## 2024-11-07 ENCOUNTER — HOSPITAL ENCOUNTER (OUTPATIENT)
Facility: HOSPITAL | Age: 79
Discharge: HOME | End: 2024-11-10
Attending: RADIOLOGY

## 2024-12-10 ENCOUNTER — HOSPITAL ENCOUNTER (OUTPATIENT)
Facility: HOSPITAL | Age: 79
Discharge: HOME | End: 2024-12-13
Attending: RADIOLOGY

## 2024-12-12 ENCOUNTER — HOSPITAL ENCOUNTER (OUTPATIENT)
Facility: HOSPITAL | Age: 79
Discharge: HOME | End: 2024-12-15
Attending: RADIOLOGY

## 2024-12-16 ENCOUNTER — HOSPITAL ENCOUNTER (OUTPATIENT)
Facility: HOSPITAL | Age: 79
Discharge: HOME | End: 2024-12-19
Attending: RADIOLOGY

## 2024-12-17 ENCOUNTER — OFFICE VISIT (OUTPATIENT)
Facility: CLINIC | Age: 79
End: 2024-12-17
Payer: MEDICARE

## 2024-12-17 VITALS
SYSTOLIC BLOOD PRESSURE: 124 MMHG | TEMPERATURE: 98 F | DIASTOLIC BLOOD PRESSURE: 80 MMHG | BODY MASS INDEX: 23.19 KG/M2 | WEIGHT: 171.2 LBS | OXYGEN SATURATION: 97 % | HEART RATE: 58 BPM | HEIGHT: 72 IN | RESPIRATION RATE: 16 BRPM

## 2024-12-17 DIAGNOSIS — E78.00 PURE HYPERCHOLESTEROLEMIA: ICD-10-CM

## 2024-12-17 DIAGNOSIS — N18.9 ANEMIA DUE TO CHRONIC KIDNEY DISEASE, UNSPECIFIED CKD STAGE: ICD-10-CM

## 2024-12-17 DIAGNOSIS — N18.32 STAGE 3B CHRONIC KIDNEY DISEASE (HCC): ICD-10-CM

## 2024-12-17 DIAGNOSIS — D84.9 IMMUNOSUPPRESSION (HCC): ICD-10-CM

## 2024-12-17 DIAGNOSIS — I10 PRIMARY HYPERTENSION: Primary | ICD-10-CM

## 2024-12-17 DIAGNOSIS — Z94.0 HISTORY OF KIDNEY TRANSPLANT: ICD-10-CM

## 2024-12-17 DIAGNOSIS — D63.1 ANEMIA DUE TO CHRONIC KIDNEY DISEASE, UNSPECIFIED CKD STAGE: ICD-10-CM

## 2024-12-17 DIAGNOSIS — E11.9 TYPE 2 DIABETES MELLITUS WITHOUT COMPLICATION, WITHOUT LONG-TERM CURRENT USE OF INSULIN (HCC): ICD-10-CM

## 2024-12-17 PROCEDURE — 3074F SYST BP LT 130 MM HG: CPT | Performed by: PHYSICIAN ASSISTANT

## 2024-12-17 PROCEDURE — 1160F RVW MEDS BY RX/DR IN RCRD: CPT | Performed by: PHYSICIAN ASSISTANT

## 2024-12-17 PROCEDURE — 3044F HG A1C LEVEL LT 7.0%: CPT | Performed by: PHYSICIAN ASSISTANT

## 2024-12-17 PROCEDURE — 1123F ACP DISCUSS/DSCN MKR DOCD: CPT | Performed by: PHYSICIAN ASSISTANT

## 2024-12-17 PROCEDURE — 1126F AMNT PAIN NOTED NONE PRSNT: CPT | Performed by: PHYSICIAN ASSISTANT

## 2024-12-17 PROCEDURE — G8484 FLU IMMUNIZE NO ADMIN: HCPCS | Performed by: PHYSICIAN ASSISTANT

## 2024-12-17 PROCEDURE — 99214 OFFICE O/P EST MOD 30 MIN: CPT | Performed by: PHYSICIAN ASSISTANT

## 2024-12-17 PROCEDURE — G8420 CALC BMI NORM PARAMETERS: HCPCS | Performed by: PHYSICIAN ASSISTANT

## 2024-12-17 PROCEDURE — G8427 DOCREV CUR MEDS BY ELIG CLIN: HCPCS | Performed by: PHYSICIAN ASSISTANT

## 2024-12-17 PROCEDURE — 3078F DIAST BP <80 MM HG: CPT | Performed by: PHYSICIAN ASSISTANT

## 2024-12-17 PROCEDURE — 1036F TOBACCO NON-USER: CPT | Performed by: PHYSICIAN ASSISTANT

## 2024-12-17 PROCEDURE — 1159F MED LIST DOCD IN RCRD: CPT | Performed by: PHYSICIAN ASSISTANT

## 2024-12-17 ASSESSMENT — ENCOUNTER SYMPTOMS
CONSTIPATION: 0
DIARRHEA: 0
BLOOD IN STOOL: 0
SHORTNESS OF BREATH: 0
VOMITING: 0
ABDOMINAL PAIN: 0
NAUSEA: 0

## 2024-12-17 NOTE — PROGRESS NOTES
Poncho Driver Jr. is a 79 y.o. 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 He had labs done in October through [facility] team and all has looked good. He was put on iron for iron deficiency anemia on top of anemia of CKD. He overall feels good.   He has been getting radiation treatments to multiple lesions on his scalp and face. He had R ear removed and is working with plastic specialist to get hearing piece implanted for better hearing on R side and prosthetic ear.     He is on home BP monitoring from Nephrologist-Debi. Always normal at home, tends to run higher in office. He is running CKD3B consistently.  His HLD is well controlled on Lipitor 40 mg and is compliant with all other meds.  He has hx of DM2 but is running normal with A1C 5.4 on diet only.     he specifically denies any CP, SOB, HA. Dizziness, fevers, chills, N/V/D, urinary symptoms or other bowel changes.    Current Outpatient Medications on File Prior to Visit   Medication Sig Dispense Refill    acetaminophen (TYLENOL) 650 MG extended release tablet Take 1 tablet by mouth every 8 hours as needed for Pain 1 tab for pain      atorvastatin (LIPITOR) 40 MG tablet Take 1 tablet by mouth daily 1 tab      hydrALAZINE (APRESOLINE) 25 MG tablet Take 1 tablet by mouth daily      vitamin E 400 UNIT capsule Take 1 capsule by mouth daily 1 capsule      KRILL OIL PO Take 1 tablet by mouth daily with omega 3      amLODIPine (NORVASC) 10 MG tablet TAKE 1 TABLET BY MOUTH EVERY DAY      carvedilol (COREG) 6.25 MG tablet TAKE 2 TABLET BY MOUTH TWICE A DAY WITH FOOD      Cholecalciferol 50 MCG (2000 UT) TABS by mouth EVERY DAY AT BEDTIME 1 tablet      famotidine (PEPCID) 20 MG tablet 20 mg 1 tab by mouth daily      folic acid (FOLVITE)

## 2024-12-17 NOTE — PROGRESS NOTES
Poncho Driver Jr.  Identified pt with two pt identifiers(name and ).     Chief Complaint   Patient presents with    Hypertension     Room 4B //b     Cholesterol Problem    Diabetes       Reviewed record In preparation for visit and have obtained necessary documentation.     1. Have you been to the ER, urgent care clinic or hospitalized since your last visit? No     2. Have you seen or consulted any other health care providers outside of the [facility] since your last visit? Include any pap smears or colon screening. No    Patient has an advance directive.     Vitals reviewed with provider.    Health Maintenance reviewed:     Health Maintenance Due   Topic    Hepatitis B vaccine (1 of 3 - Risk Dialysis 4-dose series)    DTaP/Tdap/Td vaccine (1 - Tdap)    COVID-19 Vaccine ( season)          Wt Readings from Last 3 Encounters:   24 77.7 kg (171 lb 3.2 oz)   24 78.5 kg (173 lb)   24 75.3 kg (166 lb)        Temp Readings from Last 3 Encounters:   24 97.8 °F (36.6 °C) (Oral)   05/15/24 98.4 °F (36.9 °C)   24 98 °F (36.7 °C) (Temporal)        BP Readings from Last 3 Encounters:   24 130/69   24 128/72   05/15/24 132/68        Pulse Readings from Last 3 Encounters:   24 60   24 54   05/15/24 61             No data to display                  Learning Assessment:   :         2023     1:00 PM   [facility] AMB LEARNING ASSESSMENT   Primary Learner Patient   Primary Language ENGLISH   Learning Preference OTHER (COMMENT)   Answered By Patient   Relationship to Learner SELF         Fall Risk Assessment:         2024     1:38 PM 2023     5:13 PM 2022    11:00 AM 2021    11:47 AM   Amb Fall Risk Assessment and TUG Test   Do you feel unsteady or are you worried about falling?  no no     2 or more falls in past year? no no     Fall with injury in past year? no no     Fall in past 12 months?   0 0   Able to walk?   Yes Yes

## 2024-12-19 ENCOUNTER — HOSPITAL ENCOUNTER (OUTPATIENT)
Facility: HOSPITAL | Age: 79
Discharge: HOME | End: 2024-12-22
Attending: RADIOLOGY

## 2024-12-23 ENCOUNTER — HOSPITAL ENCOUNTER (OUTPATIENT)
Facility: HOSPITAL | Age: 79
Discharge: HOME | End: 2024-12-26
Attending: RADIOLOGY

## 2024-12-26 ENCOUNTER — HOSPITAL ENCOUNTER (OUTPATIENT)
Facility: HOSPITAL | Age: 79
Discharge: HOME | End: 2024-12-29
Attending: RADIOLOGY

## 2024-12-31 ENCOUNTER — HOSPITAL ENCOUNTER (OUTPATIENT)
Facility: HOSPITAL | Age: 79
Discharge: HOME | End: 2025-01-03
Attending: RADIOLOGY

## 2025-01-13 ENCOUNTER — HOSPITAL ENCOUNTER (OUTPATIENT)
Facility: HOSPITAL | Age: 80
Discharge: HOME | End: 2025-01-16
Attending: RADIOLOGY

## 2025-01-16 ENCOUNTER — HOSPITAL ENCOUNTER (OUTPATIENT)
Facility: HOSPITAL | Age: 80
Discharge: HOME | End: 2025-01-19
Attending: RADIOLOGY

## 2025-02-18 ENCOUNTER — OFFICE VISIT (OUTPATIENT)
Facility: CLINIC | Age: 80
End: 2025-02-18
Payer: MEDICARE

## 2025-02-18 VITALS
HEIGHT: 72 IN | HEART RATE: 56 BPM | DIASTOLIC BLOOD PRESSURE: 80 MMHG | BODY MASS INDEX: 22.7 KG/M2 | SYSTOLIC BLOOD PRESSURE: 136 MMHG | RESPIRATION RATE: 16 BRPM | WEIGHT: 167.6 LBS | TEMPERATURE: 97.7 F | OXYGEN SATURATION: 99 %

## 2025-02-18 DIAGNOSIS — E55.9 VITAMIN D DEFICIENCY: ICD-10-CM

## 2025-02-18 DIAGNOSIS — N18.9 ANEMIA DUE TO CHRONIC KIDNEY DISEASE, UNSPECIFIED CKD STAGE: ICD-10-CM

## 2025-02-18 DIAGNOSIS — E11.9 TYPE 2 DIABETES MELLITUS WITHOUT COMPLICATION, WITHOUT LONG-TERM CURRENT USE OF INSULIN (HCC): ICD-10-CM

## 2025-02-18 DIAGNOSIS — E78.00 PURE HYPERCHOLESTEROLEMIA: ICD-10-CM

## 2025-02-18 DIAGNOSIS — Z94.0 HISTORY OF KIDNEY TRANSPLANT: ICD-10-CM

## 2025-02-18 DIAGNOSIS — D84.9 IMMUNOSUPPRESSION: ICD-10-CM

## 2025-02-18 DIAGNOSIS — D63.1 ANEMIA DUE TO CHRONIC KIDNEY DISEASE, UNSPECIFIED CKD STAGE: ICD-10-CM

## 2025-02-18 DIAGNOSIS — Z01.818 PRE-OPERATIVE EXAM: Primary | ICD-10-CM

## 2025-02-18 DIAGNOSIS — I10 PRIMARY HYPERTENSION: ICD-10-CM

## 2025-02-18 DIAGNOSIS — N18.4 CHRONIC KIDNEY DISEASE, STAGE 4 (SEVERE) (HCC): ICD-10-CM

## 2025-02-18 PROCEDURE — 3075F SYST BP GE 130 - 139MM HG: CPT | Performed by: PHYSICIAN ASSISTANT

## 2025-02-18 PROCEDURE — 1126F AMNT PAIN NOTED NONE PRSNT: CPT | Performed by: PHYSICIAN ASSISTANT

## 2025-02-18 PROCEDURE — 1036F TOBACCO NON-USER: CPT | Performed by: PHYSICIAN ASSISTANT

## 2025-02-18 PROCEDURE — 3078F DIAST BP <80 MM HG: CPT | Performed by: PHYSICIAN ASSISTANT

## 2025-02-18 PROCEDURE — 1123F ACP DISCUSS/DSCN MKR DOCD: CPT | Performed by: PHYSICIAN ASSISTANT

## 2025-02-18 PROCEDURE — 99214 OFFICE O/P EST MOD 30 MIN: CPT | Performed by: PHYSICIAN ASSISTANT

## 2025-02-18 PROCEDURE — 1160F RVW MEDS BY RX/DR IN RCRD: CPT | Performed by: PHYSICIAN ASSISTANT

## 2025-02-18 PROCEDURE — G8420 CALC BMI NORM PARAMETERS: HCPCS | Performed by: PHYSICIAN ASSISTANT

## 2025-02-18 PROCEDURE — 1159F MED LIST DOCD IN RCRD: CPT | Performed by: PHYSICIAN ASSISTANT

## 2025-02-18 PROCEDURE — G8427 DOCREV CUR MEDS BY ELIG CLIN: HCPCS | Performed by: PHYSICIAN ASSISTANT

## 2025-02-18 SDOH — ECONOMIC STABILITY: FOOD INSECURITY: WITHIN THE PAST 12 MONTHS, THE FOOD YOU BOUGHT JUST DIDN'T LAST AND YOU DIDN'T HAVE MONEY TO GET MORE.: NEVER TRUE

## 2025-02-18 SDOH — ECONOMIC STABILITY: FOOD INSECURITY: WITHIN THE PAST 12 MONTHS, YOU WORRIED THAT YOUR FOOD WOULD RUN OUT BEFORE YOU GOT MONEY TO BUY MORE.: NEVER TRUE

## 2025-02-18 ASSESSMENT — PATIENT HEALTH QUESTIONNAIRE - PHQ9
SUM OF ALL RESPONSES TO PHQ QUESTIONS 1-9: 0
2. FEELING DOWN, DEPRESSED OR HOPELESS: NOT AT ALL
SUM OF ALL RESPONSES TO PHQ9 QUESTIONS 1 & 2: 0
1. LITTLE INTEREST OR PLEASURE IN DOING THINGS: NOT AT ALL
SUM OF ALL RESPONSES TO PHQ QUESTIONS 1-9: 0

## 2025-02-18 NOTE — PROGRESS NOTES
Poncho Driver Jr.  Identified pt with two pt identifiers(name and ).     Chief Complaint   Patient presents with    Pre-op Exam     Room 4A //        Reviewed record In preparation for visit and have obtained necessary documentation.     1. Have you been to the ER, urgent care clinic or hospitalized since your last visit? No     2. Have you seen or consulted any other health care providers outside of the [facility] since your last visit? Include any pap smears or colon screening. No    Patient has an advance directive.     Vitals reviewed with provider.    Health Maintenance reviewed:     There are no preventive care reminders to display for this patient.       Wt Readings from Last 3 Encounters:   25 76 kg (167 lb 9.6 oz)   24 77.7 kg (171 lb 3.2 oz)   24 78.5 kg (173 lb)        Temp Readings from Last 3 Encounters:   24 98 °F (36.7 °C) (Oral)   24 97.8 °F (36.6 °C) (Oral)   05/15/24 98.4 °F (36.9 °C)        BP Readings from Last 3 Encounters:   24 124/80   24 130/69   24 128/72        Pulse Readings from Last 3 Encounters:   24 58   24 60   24 54             No data to display                  Learning Assessment:   :         2023     1:00 PM   [facility] AMB LEARNING ASSESSMENT   Primary Learner Patient   Primary Language ENGLISH   Learning Preference OTHER (COMMENT)   Answered By Patient   Relationship to Learner SELF         Fall Risk Assessment:         2024     1:38 PM 2023     5:13 PM 2022    11:00 AM 2021    11:47 AM   Amb Fall Risk Assessment and TUG Test   Do you feel unsteady or are you worried about falling?  no no     2 or more falls in past year? no no     Fall with injury in past year? no no     Fall in past 12 months?   0 0   Able to walk?   Yes Yes         Abuse Screenin/2/2023    12:00 PM   AMB Abuse Screening   Do you ever feel afraid of your partner? N   Are you in a relationship 
unspecified CKD stage  Stable, needs occasional blood products hx of blood transfusions  History of kidney transplant  On chronic pred  Immunosuppression (HCC)  On chronic pred and immunosuppression meds  Vitamin D deficiency    Pure hypercholesterolemia  unchanged      There are no Patient Instructions on file for this visit.    Please keep your follow-up appointment with Nadir Du PA-C.     No follow-up provider specified.    There are no preventive care reminders to display for this patient.    I have discussed the diagnosis with the patient and the intended plan as seen in the above orders. Patient is in agreement.  The patient has received an after-visit summary and questions were answered concerning future plans.  I have discussed medication side effects and warnings with the patient as well. Warning signs for the above conditions were discussed including when to call our office or go to the emergency room.     The nurse provided the patient and/or family with advanced directive information if needed and encouraged the patient to provide a copy to the office when available.

## 2025-02-24 ENCOUNTER — HOSPITAL ENCOUNTER (OUTPATIENT)
Facility: HOSPITAL | Age: 80
Discharge: HOME | End: 2025-02-27
Attending: RADIOLOGY

## 2025-02-24 VITALS
HEART RATE: 53 BPM | HEIGHT: 72 IN | SYSTOLIC BLOOD PRESSURE: 150 MMHG | DIASTOLIC BLOOD PRESSURE: 74 MMHG | WEIGHT: 173 LBS | BODY MASS INDEX: 23.43 KG/M2

## 2025-02-24 DIAGNOSIS — C44.329 SQUAMOUS CELL CARCINOMA OF SKIN OF RIGHT CHEEK: ICD-10-CM

## 2025-02-24 DIAGNOSIS — C44.329 SQUAMOUS CELL CARCINOMA OF SKIN OF LEFT CHEEK: ICD-10-CM

## 2025-02-24 DIAGNOSIS — C44.42 SQUAMOUS CELL CARCINOMA OF SCALP: Primary | ICD-10-CM

## 2025-02-24 ASSESSMENT — PAIN SCALES - GENERAL: PAINLEVEL_OUTOF10: 0

## 2025-02-24 NOTE — PROGRESS NOTES
[facility] [place] [facility]  [facility]    Radiation Oncology Consultation    Poncho Driver Jr.  [id number]  1945     Diagnosis   1.   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 Squamous cell carcinoma of scalp  C44.42       2. Squamous cell carcinoma of skin of right cheek  C44.329       3. 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  History of Present Illness   Mr. Driver is a 79 y.o. male seen in consultation at the request of Dr. Pan to assess the role of radiation for his above diagnosis.    He had an anterior left vertex scalp lesion with biopsy- mod diff SCC    Dr. Lundberg took biopsies 8/15/24:  A. Left dorsal wrist- well diff SCC- excised 9/5/24  B. Left anterior scalp- well diff SCC*    Dr. Lundberg took biopsies 8/20/24:  A. Left vertex scalp- SCC, keratoacanthoma type*  B. Anterior left vertex scalp- mod diff SCC*  C. Central vertex scalp- SCC in situ*  D. Posterior left vertex scalp hypertrophic AK- Will follow this 12/3/24  E. Left lateral neck- SCC in situ- Dr. 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 Sharma and he follows with him.    He opted to proceed with HDR skin brachytherapy with [place] [facility] to the

## 2025-03-11 ENCOUNTER — TELEPHONE (OUTPATIENT)
Facility: CLINIC | Age: 80
End: 2025-03-11

## 2025-05-08 ENCOUNTER — HOSPITAL ENCOUNTER (OUTPATIENT)
Facility: HOSPITAL | Age: 80
Discharge: HOME | End: 2025-05-11
Attending: RADIOLOGY

## 2025-05-21 ENCOUNTER — OFFICE VISIT (OUTPATIENT)
Facility: CLINIC | Age: 80
End: 2025-05-21
Payer: MEDICARE

## 2025-05-21 VITALS
HEART RATE: 56 BPM | TEMPERATURE: 97.7 F | WEIGHT: 156 LBS | BODY MASS INDEX: 21.13 KG/M2 | DIASTOLIC BLOOD PRESSURE: 66 MMHG | OXYGEN SATURATION: 99 % | SYSTOLIC BLOOD PRESSURE: 138 MMHG | HEIGHT: 72 IN | RESPIRATION RATE: 16 BRPM

## 2025-05-21 DIAGNOSIS — E11.22 TYPE 2 DIABETES MELLITUS WITH STAGE 3B CHRONIC KIDNEY DISEASE, WITHOUT LONG-TERM CURRENT USE OF INSULIN (HCC): ICD-10-CM

## 2025-05-21 DIAGNOSIS — D84.9 IMMUNOSUPPRESSION: ICD-10-CM

## 2025-05-21 DIAGNOSIS — Z01.818 PRE-OPERATIVE EXAM: Primary | ICD-10-CM

## 2025-05-21 DIAGNOSIS — N18.32 ANEMIA DUE TO STAGE 3B CHRONIC KIDNEY DISEASE (HCC): ICD-10-CM

## 2025-05-21 DIAGNOSIS — I10 PRIMARY HYPERTENSION: ICD-10-CM

## 2025-05-21 DIAGNOSIS — N18.32 STAGE 3B CHRONIC KIDNEY DISEASE (HCC): ICD-10-CM

## 2025-05-21 DIAGNOSIS — N18.32 TYPE 2 DIABETES MELLITUS WITH STAGE 3B CHRONIC KIDNEY DISEASE, WITHOUT LONG-TERM CURRENT USE OF INSULIN (HCC): ICD-10-CM

## 2025-05-21 DIAGNOSIS — Z94.0 HISTORY OF KIDNEY TRANSPLANT: ICD-10-CM

## 2025-05-21 DIAGNOSIS — D63.1 ANEMIA DUE TO STAGE 3B CHRONIC KIDNEY DISEASE (HCC): ICD-10-CM

## 2025-05-21 DIAGNOSIS — I34.0 MITRAL VALVE INSUFFICIENCY, UNSPECIFIED ETIOLOGY: ICD-10-CM

## 2025-05-21 DIAGNOSIS — E11.9 TYPE 2 DIABETES MELLITUS WITHOUT COMPLICATION, WITHOUT LONG-TERM CURRENT USE OF INSULIN (HCC): ICD-10-CM

## 2025-05-21 PROCEDURE — 3075F SYST BP GE 130 - 139MM HG: CPT | Performed by: PHYSICIAN ASSISTANT

## 2025-05-21 PROCEDURE — 1159F MED LIST DOCD IN RCRD: CPT | Performed by: PHYSICIAN ASSISTANT

## 2025-05-21 PROCEDURE — G8420 CALC BMI NORM PARAMETERS: HCPCS | Performed by: PHYSICIAN ASSISTANT

## 2025-05-21 PROCEDURE — 99214 OFFICE O/P EST MOD 30 MIN: CPT | Performed by: PHYSICIAN ASSISTANT

## 2025-05-21 PROCEDURE — 1126F AMNT PAIN NOTED NONE PRSNT: CPT | Performed by: PHYSICIAN ASSISTANT

## 2025-05-21 PROCEDURE — 1036F TOBACCO NON-USER: CPT | Performed by: PHYSICIAN ASSISTANT

## 2025-05-21 PROCEDURE — 3078F DIAST BP <80 MM HG: CPT | Performed by: PHYSICIAN ASSISTANT

## 2025-05-21 PROCEDURE — 1123F ACP DISCUSS/DSCN MKR DOCD: CPT | Performed by: PHYSICIAN ASSISTANT

## 2025-05-21 PROCEDURE — 1160F RVW MEDS BY RX/DR IN RCRD: CPT | Performed by: PHYSICIAN ASSISTANT

## 2025-05-21 PROCEDURE — G8427 DOCREV CUR MEDS BY ELIG CLIN: HCPCS | Performed by: PHYSICIAN ASSISTANT

## 2025-05-21 NOTE — PROGRESS NOTES
Poncho Driver Jr.  Identified pt with two pt identifiers(name and ).     Chief Complaint   Patient presents with    Pre-op Exam     Room 4B //        Reviewed record In preparation for visit and have obtained necessary documentation.     1. Have you been to the ER, urgent care clinic or hospitalized since your last visit? No     2. Have you seen or consulted any other health care providers outside of the [facility] since your last visit? Include any pap smears or colon screening. No    Patient has an advance directive.     Vitals reviewed with provider.    Health Maintenance reviewed:     There are no preventive care reminders to display for this patient.       Wt Readings from Last 3 Encounters:   25 70.8 kg (156 lb)   25 78.5 kg (173 lb)   25 76 kg (167 lb 9.6 oz)        Temp Readings from Last 3 Encounters:   25 97.7 °F (36.5 °C) (Oral)   25 97.7 °F (36.5 °C) (Oral)   24 98 °F (36.7 °C) (Oral)        BP Readings from Last 3 Encounters:   25 138/66   25 (!) 150/74   25 136/80        Pulse Readings from Last 3 Encounters:   25 56   25 53   25 56             No data to display                  Learning Assessment:   :         2023     1:00 PM   [facility] AMB LEARNING ASSESSMENT   Primary Learner Patient   Primary Language ENGLISH   Learning Preference OTHER (COMMENT)   Answered By Patient   Relationship to Learner SELF         Fall Risk Assessment:         2024     1:38 PM 2023     5:13 PM 2022    11:00 AM 2021    11:47 AM   Amb Fall Risk Assessment and TUG Test   Do you feel unsteady or are you worried about falling?  no no      2 or more falls in past year? no no      Fall with injury in past year? no no      Fall in past 12 months?   0 0   Able to walk?   Yes Yes       Proxy-reported         Abuse Screenin/2/2023    12:00 PM   AMB Abuse Screening   Do you ever feel afraid of your partner? N

## 2025-05-21 NOTE — PROGRESS NOTES
Preoperative Evaluation    Date of Exam: 2025     /66 (BP Site: Left Upper Arm, Patient Position: Sitting)   Pulse 56   Temp 97.7 °F (36.5 °C) (Oral)   Resp 16   Ht 1.829 m (6')   Wt 70.8 kg (156 lb)   SpO2 99%   BMI 21.16 kg/m²      Poncho Driver Jr. is a 79 y.o. male (:1945) who presents for preoperative evaluation.   Procedure/Surgery:R cataract, debris removal  Date of Procedure/Surgery: 2025  Surgeon: [facility]urgical Facility: [facility]  Primary Physician: Nadir Du PA-C    Questions:  -Normal state of health today? yes  -Do you usually get chest pain or breathlessness when you climb up two flights of stairs at normal speed?  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    Proteinuria    Pure hypercholesterolemia    Anemia due to chronic kidney disease    Acute kidney failure, unspecified    Adult failure to thrive    Benign hypertensive renal disease    Chronic gout, unspecified, without tophus (tophi)    Congenital multiple renal cysts    Cytomegalovirus infection (HCC)    Displacement of other specified internal prosthetic devices, implants and grafts, initial encounter    Dysphagia, unspecified    Hydronephrosis with ureteral stricture, not elsewhere classified    Other viral enteritis    Other medical devices associated with adverse incidents    Encounter for surgical aftercare following surgery of sense organs    Muscle weakness    Type 2 diabetes mellitus, without long-term current use of insulin (HCC)    Chronic kidney disease, stage 4 (severe) (HCC)    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

## 2025-05-22 ENCOUNTER — RESULTS FOLLOW-UP (OUTPATIENT)
Facility: CLINIC | Age: 80
End: 2025-05-22

## 2025-05-22 LAB
ALBUMIN SERPL-MCNC: 3.6 G/DL (ref 3.5–5)
ALBUMIN/GLOB SERPL: 1.1 (ref 1.1–2.2)
ALP SERPL-CCNC: 87 U/L (ref 45–117)
ALT SERPL-CCNC: 15 U/L (ref 12–78)
ANION GAP SERPL CALC-SCNC: 6 MMOL/L (ref 2–12)
AST SERPL-CCNC: 15 U/L (ref 15–37)
BASOPHILS # BLD: 0.02 K/UL (ref 0–0.1)
BASOPHILS NFR BLD: 0.3 % (ref 0–1)
BILIRUB SERPL-MCNC: 0.7 MG/DL (ref 0.2–1)
BUN SERPL-MCNC: 39 MG/DL (ref 6–20)
BUN/CREAT SERPL: 26 (ref 12–20)
CALCIUM SERPL-MCNC: 10.2 MG/DL (ref 8.5–10.1)
CHLORIDE SERPL-SCNC: 114 MMOL/L (ref 97–108)
CO2 SERPL-SCNC: 25 MMOL/L (ref 21–32)
CREAT SERPL-MCNC: 1.5 MG/DL (ref 0.7–1.3)
DIFFERENTIAL METHOD BLD: ABNORMAL
EOSINOPHIL # BLD: 0.07 K/UL (ref 0–0.4)
EOSINOPHIL NFR BLD: 1.2 % (ref 0–7)
ERYTHROCYTE [DISTWIDTH] IN BLOOD BY AUTOMATED COUNT: 15 % (ref 11.5–14.5)
GLOBULIN SER CALC-MCNC: 3.4 G/DL (ref 2–4)
GLUCOSE SERPL-MCNC: 134 MG/DL (ref 65–100)
HCT VFR BLD AUTO: 32 % (ref 36.6–50.3)
HGB BLD-MCNC: 10.4 G/DL (ref 12.1–17)
IMM GRANULOCYTES # BLD AUTO: 0.03 K/UL (ref 0–0.04)
IMM GRANULOCYTES NFR BLD AUTO: 0.5 % (ref 0–0.5)
LYMPHOCYTES # BLD: 1.23 K/UL (ref 0.8–3.5)
LYMPHOCYTES NFR BLD: 21.2 % (ref 12–49)
MCH RBC QN AUTO: 29.5 PG (ref 26–34)
MCHC RBC AUTO-ENTMCNC: 32.5 G/DL (ref 30–36.5)
MCV RBC AUTO: 90.9 FL (ref 80–99)
MONOCYTES # BLD: 0.37 K/UL (ref 0–1)
MONOCYTES NFR BLD: 6.4 % (ref 5–13)
NEUTS SEG # BLD: 4.08 K/UL (ref 1.8–8)
NEUTS SEG NFR BLD: 70.4 % (ref 32–75)
NRBC # BLD: 0 K/UL (ref 0–0.01)
NRBC BLD-RTO: 0 PER 100 WBC
PLATELET # BLD AUTO: 109 K/UL (ref 150–400)
PMV BLD AUTO: 10.4 FL (ref 8.9–12.9)
POTASSIUM SERPL-SCNC: 3.9 MMOL/L (ref 3.5–5.1)
PROT SERPL-MCNC: 7 G/DL (ref 6.4–8.2)
RBC # BLD AUTO: 3.52 M/UL (ref 4.1–5.7)
SODIUM SERPL-SCNC: 145 MMOL/L (ref 136–145)
WBC # BLD AUTO: 5.8 K/UL (ref 4.1–11.1)

## 2025-06-19 ENCOUNTER — TELEPHONE (OUTPATIENT)
Facility: CLINIC | Age: 80
End: 2025-06-19

## 2025-06-19 DIAGNOSIS — E11.9 TYPE 2 DIABETES MELLITUS WITHOUT COMPLICATION, WITHOUT LONG-TERM CURRENT USE OF INSULIN (HCC): ICD-10-CM

## 2025-06-19 DIAGNOSIS — E11.22 TYPE 2 DIABETES MELLITUS WITH STAGE 3B CHRONIC KIDNEY DISEASE, WITHOUT LONG-TERM CURRENT USE OF INSULIN (HCC): ICD-10-CM

## 2025-06-19 DIAGNOSIS — Z94.0 HISTORY OF KIDNEY TRANSPLANT: ICD-10-CM

## 2025-06-19 DIAGNOSIS — N18.32 TYPE 2 DIABETES MELLITUS WITH STAGE 3B CHRONIC KIDNEY DISEASE, WITHOUT LONG-TERM CURRENT USE OF INSULIN (HCC): ICD-10-CM

## 2025-06-19 DIAGNOSIS — I10 PRIMARY HYPERTENSION: Primary | ICD-10-CM

## 2025-06-19 DIAGNOSIS — D84.9 IMMUNOSUPPRESSION: ICD-10-CM

## 2025-06-19 DIAGNOSIS — D63.1 ANEMIA DUE TO STAGE 3B CHRONIC KIDNEY DISEASE (HCC): ICD-10-CM

## 2025-06-19 DIAGNOSIS — N18.32 ANEMIA DUE TO STAGE 3B CHRONIC KIDNEY DISEASE (HCC): ICD-10-CM

## 2025-06-19 DIAGNOSIS — N18.32 STAGE 3B CHRONIC KIDNEY DISEASE (HCC): ICD-10-CM

## 2025-06-19 NOTE — TELEPHONE ENCOUNTER
Pt wife called and stated that the pt has now reached the discission that they do need the home health aid/ therapist. And he would like to know what they need to do now.

## 2025-06-26 ENCOUNTER — HOSPITAL ENCOUNTER (OUTPATIENT)
Facility: HOSPITAL | Age: 80
Discharge: HOME | End: 2025-06-29
Attending: RADIOLOGY

## 2025-06-30 ENCOUNTER — HOSPITAL ENCOUNTER (OUTPATIENT)
Facility: HOSPITAL | Age: 80
Discharge: HOME | End: 2025-07-03
Attending: RADIOLOGY

## 2025-07-03 ENCOUNTER — HOSPITAL ENCOUNTER (OUTPATIENT)
Facility: HOSPITAL | Age: 80
Discharge: HOME | End: 2025-07-06
Attending: RADIOLOGY

## 2025-07-07 ENCOUNTER — HOSPITAL ENCOUNTER (OUTPATIENT)
Facility: HOSPITAL | Age: 80
Discharge: HOME | End: 2025-07-10
Attending: RADIOLOGY

## 2025-07-10 ENCOUNTER — HOSPITAL ENCOUNTER (OUTPATIENT)
Facility: HOSPITAL | Age: 80
Discharge: HOME | End: 2025-07-13
Attending: RADIOLOGY

## 2025-07-14 ENCOUNTER — HOSPITAL ENCOUNTER (OUTPATIENT)
Facility: HOSPITAL | Age: 80
Discharge: HOME | End: 2025-07-17
Attending: RADIOLOGY

## 2025-07-14 PROBLEM — E86.0 MODERATE DEHYDRATION: Status: ACTIVE | Noted: 2025-07-14

## 2025-07-17 ENCOUNTER — HOSPITAL ENCOUNTER (OUTPATIENT)
Facility: HOSPITAL | Age: 80
Discharge: HOME | End: 2025-07-20
Attending: RADIOLOGY

## 2025-07-21 ENCOUNTER — HOSPITAL ENCOUNTER (OUTPATIENT)
Facility: HOSPITAL | Age: 80
Discharge: HOME | End: 2025-07-24
Attending: RADIOLOGY

## 2025-07-24 ENCOUNTER — HOSPITAL ENCOUNTER (OUTPATIENT)
Facility: HOSPITAL | Age: 80
Discharge: HOME | End: 2025-07-27
Attending: RADIOLOGY

## 2025-07-25 ENCOUNTER — HOSPITAL ENCOUNTER (OUTPATIENT)
Facility: HOSPITAL | Age: 80
Setting detail: INFUSION SERIES
Discharge: HOME | End: 2025-07-25
Payer: MEDICARE

## 2025-07-25 VITALS
SYSTOLIC BLOOD PRESSURE: 123 MMHG | DIASTOLIC BLOOD PRESSURE: 68 MMHG | OXYGEN SATURATION: 99 % | HEART RATE: 56 BPM | TEMPERATURE: 98.2 F | RESPIRATION RATE: 18 BRPM

## 2025-07-25 DIAGNOSIS — E86.0 MODERATE DEHYDRATION: Primary | ICD-10-CM

## 2025-07-25 PROCEDURE — 96361 HYDRATE IV INFUSION ADD-ON: CPT

## 2025-07-25 PROCEDURE — 96360 HYDRATION IV INFUSION INIT: CPT

## 2025-07-25 PROCEDURE — 2580000003 HC RX 258: Performed by: INTERNAL MEDICINE

## 2025-07-25 RX ORDER — SODIUM CHLORIDE 450 MG/100ML
INJECTION, SOLUTION INTRAVENOUS ONCE
Status: CANCELLED
Start: 2025-07-25 | End: 2025-07-25

## 2025-07-25 RX ORDER — SODIUM CHLORIDE 450 MG/100ML
INJECTION, SOLUTION INTRAVENOUS ONCE
Status: COMPLETED | OUTPATIENT
Start: 2025-07-25 | End: 2025-07-25

## 2025-07-25 RX ADMIN — SODIUM CHLORIDE: 0.45 INJECTION, SOLUTION INTRAVENOUS at 09:28

## 2025-07-25 NOTE — PROGRESS NOTES
Name: Poncho Driver Jr.    MRN: [id number]         : 1945    Mr. Driver Arrived via scooter and in no distress for Hydration.  Assessment was completed. Pt has multiple skin cancers in varied stages of healing on head and face. Currently receiving radiation to left face; redness noted and eye drainage. 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 Several bandages to left arm from recent \"cancer\" surgery. 24 gauge IV started in right arm without difficulty, brisk blood return.      Patient Vitals for the past 24 hrs:   BP Temp Temp src Pulse Resp SpO2   25 1348 123/68 -- -- 56 18 --   25 0907 123/66 98.2 °F (36.8 °C) Temporal 59 18 99 %     Medications:  Medications Administered         0.45 % sodium chloride infusion Admin Date  2025 Action  New Bag Dose   Rate  250 mL/hr Route  IntraVENous Documented By  Yasmine Cullen, RN          Mr. Driver tolerated infusion well and [facility] at 1355.   Peripheral IV flushed and removed. Pt to follow-up with referring provider regarding future appointments.     YASMINE CULLEN RN  2025

## 2025-07-25 NOTE — PROGRESS NOTES
Spiritual Care Partner Volunteer visited patient at [facility] in [facility] on 7/25/2025   Documented by: Chaplain Jose Eduardo Weinberg M.Div., [facility].   Paging Service: [phone])

## 2025-07-28 ENCOUNTER — HOSPITAL ENCOUNTER (OUTPATIENT)
Facility: HOSPITAL | Age: 80
Discharge: HOME | End: 2025-07-31
Attending: RADIOLOGY

## (undated) DEVICE — SUTURE ETHLN SZ 4-0 L18IN NONABSORBABLE BLK L19MM PS-2 3/8 1667H

## (undated) DEVICE — INFECTION CONTROL KIT SYS

## (undated) DEVICE — SUTURE PROL SZ 5-0 L24IN NONABSORBABLE BLU RB-2 L13IN 1/2 8554H

## (undated) DEVICE — DRAIN PENR 0.25X18IN LTX STRL -- PENROSE LATEX

## (undated) DEVICE — SOLUTION IV 500ML 0.9% SOD CHL FLX CONT

## (undated) DEVICE — SUTURE PERMAHAND SZ 3-0 L30IN NONABSORBABLE BLK SILK BRAID A304H

## (undated) DEVICE — LABEL MED CARD MRMC STRL

## (undated) DEVICE — NEEDLE HYPO 18GA L1.5IN PNK S STL HUB POLYPR SHLD REG BVL

## (undated) DEVICE — STAPLER SKIN H3.9MM WIRE DIA0.58MM CRWN 6.9MM 35 STPL ROT

## (undated) DEVICE — SPONGE GZ W4XL4IN COT 12 PLY TYP VII WVN C FLD DSGN

## (undated) DEVICE — SOLUTION IV 1000ML 0.9% SOD CHL

## (undated) DEVICE — SUTURE MCRYL SZ 4-0 L27IN ABSRB UD L19MM PS-2 1/2 CIR PRIM Y426H

## (undated) DEVICE — STERILE POLYISOPRENE POWDER-FREE SURGICAL GLOVES: Brand: PROTEXIS

## (undated) DEVICE — SPONGE LAP 18X18IN STRL -- 5/PK

## (undated) DEVICE — SYR 3ML LL TIP 1/10ML GRAD --

## (undated) DEVICE — COVER,MAYO STAND,STERILE: Brand: MEDLINE

## (undated) DEVICE — PROBE VASC 8MHZ WTRPRF

## (undated) DEVICE — HANDLE LT SNAP ON ULT DURABLE LENS FOR TRUMPF ALC DISPOSABLE

## (undated) DEVICE — BLADE ASSEMB CLP HAIR FINE --

## (undated) DEVICE — AGENT HEMSTAT W4XL4IN OXIDIZED REGENERATED CELOS ABSRB SFT

## (undated) DEVICE — SUTURE VCRL SZ 3-0 L27IN ABSRB UD L26MM SH 1/2 CIR J416H

## (undated) DEVICE — STRAP,POSITIONING,KNEE/BODY,FOAM,4X60": Brand: MEDLINE

## (undated) DEVICE — SUTURE PERMAHAND SZ 2-0 L30IN NONABSORBABLE BLK SILK W/O A305H

## (undated) DEVICE — STRIP,CLOSURE,WOUND,MEDI-STRIP,1/2X4: Brand: MEDLINE

## (undated) DEVICE — TOWEL SURG W17XL27IN STD BLU COT NONFENESTRATED PREWASHED

## (undated) DEVICE — (D)PREP SKN CHLRAPRP APPL 26ML -- CONVERT TO ITEM 371833

## (undated) DEVICE — SUT PROL 6-0 24IN BV1 DA BLU --

## (undated) DEVICE — REM POLYHESIVE ADULT PATIENT RETURN ELECTRODE: Brand: VALLEYLAB

## (undated) DEVICE — Device